# Patient Record
Sex: FEMALE | Race: WHITE | NOT HISPANIC OR LATINO | Employment: FULL TIME | URBAN - METROPOLITAN AREA
[De-identification: names, ages, dates, MRNs, and addresses within clinical notes are randomized per-mention and may not be internally consistent; named-entity substitution may affect disease eponyms.]

---

## 2017-03-13 RX ORDER — FLUTICASONE PROPIONATE 50 MCG
SPRAY, SUSPENSION (ML) NASAL
Qty: 1 BOTTLE | Refills: 1 | Status: SHIPPED | OUTPATIENT
Start: 2017-03-13 | End: 2017-12-27

## 2017-05-23 RX ORDER — LEVOTHYROXINE SODIUM 0.07 MG/1
TABLET ORAL
Qty: 90 TABLET | Refills: 0 | Status: SHIPPED | OUTPATIENT
Start: 2017-05-23 | End: 2017-07-20 | Stop reason: SDUPTHER

## 2017-07-05 ENCOUNTER — APPOINTMENT (OUTPATIENT)
Dept: WOMENS IMAGING | Facility: HOSPITAL | Age: 52
End: 2017-07-05

## 2017-07-05 PROCEDURE — 77067 SCR MAMMO BI INCL CAD: CPT | Performed by: RADIOLOGY

## 2017-07-17 ENCOUNTER — OFFICE VISIT (OUTPATIENT)
Dept: FAMILY MEDICINE CLINIC | Facility: CLINIC | Age: 52
End: 2017-07-17

## 2017-07-17 VITALS
HEIGHT: 62 IN | SYSTOLIC BLOOD PRESSURE: 122 MMHG | DIASTOLIC BLOOD PRESSURE: 78 MMHG | OXYGEN SATURATION: 100 % | WEIGHT: 147 LBS | HEART RATE: 50 BPM | BODY MASS INDEX: 27.05 KG/M2

## 2017-07-17 DIAGNOSIS — Z00.00 HEALTHCARE MAINTENANCE: Primary | ICD-10-CM

## 2017-07-17 DIAGNOSIS — E03.9 ADULT HYPOTHYROIDISM: ICD-10-CM

## 2017-07-17 DIAGNOSIS — E55.9 AVITAMINOSIS D: ICD-10-CM

## 2017-07-17 LAB
25(OH)D3+25(OH)D2 SERPL-MCNC: 38 NG/ML (ref 30–100)
ALBUMIN SERPL-MCNC: 4.6 G/DL (ref 3.5–5.2)
ALBUMIN/GLOB SERPL: 1.8 G/DL
ALP SERPL-CCNC: 65 U/L (ref 39–117)
ALT SERPL-CCNC: 15 U/L (ref 1–33)
AST SERPL-CCNC: 19 U/L (ref 1–32)
BASOPHILS # BLD AUTO: 0.09 10*3/MM3 (ref 0–0.2)
BASOPHILS NFR BLD AUTO: 1.4 % (ref 0–1.5)
BILIRUB SERPL-MCNC: 0.3 MG/DL (ref 0.1–1.2)
BUN SERPL-MCNC: 9 MG/DL (ref 6–20)
BUN/CREAT SERPL: 12.2 (ref 7–25)
CALCIUM SERPL-MCNC: 9.3 MG/DL (ref 8.6–10.5)
CHLORIDE SERPL-SCNC: 100 MMOL/L (ref 98–107)
CHOLEST SERPL-MCNC: 241 MG/DL (ref 0–200)
CO2 SERPL-SCNC: 25 MMOL/L (ref 22–29)
CREAT SERPL-MCNC: 0.74 MG/DL (ref 0.57–1)
EOSINOPHIL # BLD AUTO: 0.13 10*3/MM3 (ref 0–0.7)
EOSINOPHIL NFR BLD AUTO: 2.1 % (ref 0.3–6.2)
ERYTHROCYTE [DISTWIDTH] IN BLOOD BY AUTOMATED COUNT: 13 % (ref 11.7–13)
GLOBULIN SER CALC-MCNC: 2.6 GM/DL
GLUCOSE SERPL-MCNC: 91 MG/DL (ref 65–99)
HCT VFR BLD AUTO: 42.7 % (ref 35.6–45.5)
HDLC SERPL-MCNC: 54 MG/DL (ref 40–60)
HGB BLD-MCNC: 13.7 G/DL (ref 11.9–15.5)
IMM GRANULOCYTES # BLD: 0.02 10*3/MM3 (ref 0–0.03)
IMM GRANULOCYTES NFR BLD: 0.3 % (ref 0–0.5)
LDLC SERPL CALC-MCNC: 163 MG/DL (ref 0–100)
LDLC/HDLC SERPL: 3.02 {RATIO}
LYMPHOCYTES # BLD AUTO: 2.23 10*3/MM3 (ref 0.9–4.8)
LYMPHOCYTES NFR BLD AUTO: 35.3 % (ref 19.6–45.3)
MCH RBC QN AUTO: 30.6 PG (ref 26.9–32)
MCHC RBC AUTO-ENTMCNC: 32.1 G/DL (ref 32.4–36.3)
MCV RBC AUTO: 95.3 FL (ref 80.5–98.2)
MONOCYTES # BLD AUTO: 0.59 10*3/MM3 (ref 0.2–1.2)
MONOCYTES NFR BLD AUTO: 9.4 % (ref 5–12)
NEUTROPHILS # BLD AUTO: 3.25 10*3/MM3 (ref 1.9–8.1)
NEUTROPHILS NFR BLD AUTO: 51.5 % (ref 42.7–76)
PLATELET # BLD AUTO: 231 10*3/MM3 (ref 140–500)
POTASSIUM SERPL-SCNC: 4 MMOL/L (ref 3.5–5.2)
PROT SERPL-MCNC: 7.2 G/DL (ref 6–8.5)
RBC # BLD AUTO: 4.48 10*6/MM3 (ref 3.9–5.2)
SODIUM SERPL-SCNC: 139 MMOL/L (ref 136–145)
TRIGL SERPL-MCNC: 119 MG/DL (ref 0–150)
VLDLC SERPL CALC-MCNC: 23.8 MG/DL (ref 5–40)
WBC # BLD AUTO: 6.31 10*3/MM3 (ref 4.5–10.7)

## 2017-07-17 PROCEDURE — 99396 PREV VISIT EST AGE 40-64: CPT | Performed by: FAMILY MEDICINE

## 2017-07-17 RX ORDER — CLOTRIMAZOLE AND BETAMETHASONE DIPROPIONATE 10; .64 MG/G; MG/G
CREAM TOPICAL 2 TIMES DAILY
COMMUNITY
End: 2017-11-22

## 2017-07-17 RX ORDER — DEXLANSOPRAZOLE 60 MG/1
60 CAPSULE, DELAYED RELEASE ORAL DAILY
COMMUNITY
End: 2017-09-08 | Stop reason: SDUPTHER

## 2017-07-17 NOTE — PROGRESS NOTES
Carolin Zapien is a 52 y.o. female.  Seen 07/17/2017    Assessment/Plan   Problem List Items Addressed This Visit        Digestive    Avitaminosis D    Relevant Orders    Vitamin D 25 Hydroxy       Endocrine    Adult hypothyroidism    Overview     [place] 7/17/2017  Labs are drawn today.             Other Visit Diagnoses     Healthcare maintenance    -  Primary    Relevant Orders    Comprehensive metabolic panel (Completed)    Lipid Panel With LDL/HDL Ratio (Completed)    CBC and Differential (Completed)    Vitamin D 25 Hydroxy             Return in about 1 year (around 7/17/2018) for Annual physical.  There are no Patient Instructions on file for this visit.    Subjective     Chief Complaint   Patient presents with   • Annual Exam     Social History   Substance Use Topics   • Smoking status: Former Smoker   • Smokeless tobacco: Never Used   • Alcohol use Yes      Comment: SOCIAL       History of Present Illness     Annual Exam: Patient presents for annual exam.  findings; last pap: approximate date 7/17 and was normal  Last mammo: approximate date 7/17 and was normal   The patient is not  still having menses.Last menstrual period: > 5 years ago.  History; colonoscopy: Last colonoscopy: colonoscopy 7 years ago without abnormalities.    The patient wears seatbelts: yes.  The patient regularly exercises: yes. She does walking     She does see a dentist regularly.   Her immunization are up-to-date.  She is eating a healthy diet.     The following portions of the patient's history were reviewed and updated as appropriate:PMHroutine: Social history , Past Medical History, Surgical history , Allergies, Current Medications, Active Problem List, Family History and Health Maintenance    Review of Systems   Constitutional: Negative for activity change, appetite change, chills, fatigue, fever and unexpected weight change.   HENT: Positive for sore throat. Negative for congestion, ear pain, hearing loss, nosebleeds and  "rhinorrhea.    Eyes: Positive for discharge (related to tear ducts being clogged as well as allergies) and itching. Negative for pain, redness and visual disturbance.   Respiratory: Negative for cough, shortness of breath and wheezing.    Cardiovascular: Negative for chest pain, palpitations and leg swelling.   Gastrointestinal: Positive for abdominal pain (right sided abdominal pain -- really on the flank. She had a kidney cyst many years ago but this pain is different. Constant, when she wore a belt one time it was severe. moving with twisting makes it worse. Eating doesn't affect it. ). Negative for blood in stool, constipation, diarrhea, nausea and vomiting.   Endocrine: Negative for cold intolerance and heat intolerance.   Genitourinary: Negative for difficulty urinating, dysuria, frequency, hematuria, pelvic pain, urgency and vaginal discharge.   Musculoskeletal: Negative for arthralgias, back pain and joint swelling.        Joint stiffness    Skin: Positive for rash (looks like rash). Negative for wound.   Neurological: Negative for dizziness, weakness, numbness and headaches.   Hematological: Does not bruise/bleed easily.   Psychiatric/Behavioral: Negative for dysphoric mood, sleep disturbance and suicidal ideas. The patient is not nervous/anxious.        Objective   Vitals:    07/17/17 0840   BP: 122/78   Pulse: 50   SpO2: 100%   Weight: 147 lb (66.7 kg)   Height: 61.5\" (156.2 cm)     Body mass index is 27.33 kg/(m^2).  Physical Exam   Constitutional: She is oriented to person, place, and time. She appears well-developed and well-nourished. No distress.   HENT:   Head: Normocephalic and atraumatic.   Right Ear: Tympanic membrane, external ear and ear canal normal.   Left Ear: Tympanic membrane, external ear and ear canal normal.   Mouth/Throat: Oropharynx is clear and moist.   Eyes: Conjunctivae are normal.   Neck: Normal range of motion. Neck supple. No tracheal deviation present. No thyromegaly present. "   Cardiovascular: Normal rate, regular rhythm, normal heart sounds and intact distal pulses.    Pulmonary/Chest: Effort normal and breath sounds normal. No respiratory distress. She has no wheezes. She has no rales.   Abdominal: Soft. Bowel sounds are normal. She exhibits no distension. There is no hepatosplenomegaly. There is no tenderness.   Musculoskeletal: She exhibits no edema or deformity.   Lymphadenopathy:     She has no cervical adenopathy.   Neurological: She is alert and oriented to person, place, and time.   Skin: Skin is warm and dry.   Psychiatric: She has a normal mood and affect. Her behavior is normal. Judgment and thought content normal.   Vitals reviewed.    Reviewed Data:  Labs are drawn today

## 2017-07-18 ENCOUNTER — TELEPHONE (OUTPATIENT)
Dept: FAMILY MEDICINE CLINIC | Facility: CLINIC | Age: 52
End: 2017-07-18

## 2017-07-18 LAB — SPECIMEN STATUS: NORMAL

## 2017-07-19 LAB — TSH SERPL DL<=0.005 MIU/L-ACNC: 2.85 MIU/ML (ref 0.27–4.2)

## 2017-07-20 RX ORDER — LEVOTHYROXINE SODIUM 0.07 MG/1
TABLET ORAL
Qty: 90 TABLET | Refills: 0 | Status: SHIPPED | OUTPATIENT
Start: 2017-07-20 | End: 2017-11-09 | Stop reason: SDUPTHER

## 2017-09-05 ENCOUNTER — TELEPHONE (OUTPATIENT)
Dept: GASTROENTEROLOGY | Facility: CLINIC | Age: 52
End: 2017-09-05

## 2017-09-05 NOTE — TELEPHONE ENCOUNTER
----- Message from Steve Lozoya sent at 9/5/2017 11:33 AM EDT -----  Regarding: PT CALLED  Contact: [phone]  PT STATED HER REFLUX IS FLARING UP AGAIN & IS REQUESTING  A REFILL ?       PHARM#[phone]

## 2017-09-05 NOTE — TELEPHONE ENCOUNTER
Called pt back. Advised that she has not been seen since 6/2013. Advised that in order to fill prescription medication, our doctors need to see pt at least once per year. Advised we can make an appt for her to be seen or if she does not feel like she needs to be seen by a GI specialist, she can see if her PMD can take over filling medication. She verb understanding and states she will see if her PMD will mora med and if not will call back to make appt.

## 2017-09-06 ENCOUNTER — TELEPHONE (OUTPATIENT)
Dept: FAMILY MEDICINE CLINIC | Facility: CLINIC | Age: 52
End: 2017-09-06

## 2017-09-06 NOTE — TELEPHONE ENCOUNTER
----- Message from Cesilia Montague MA sent at 9/6/2017  4:35 PM EDT -----  Pt called and stated that she has had problems with reflux for over 10 years. She would take Dexilant for a couple months then stop. Ok to send in a new Rx?

## 2017-09-08 RX ORDER — DEXLANSOPRAZOLE 60 MG/1
60 CAPSULE, DELAYED RELEASE ORAL DAILY
Qty: 90 CAPSULE | Refills: 0 | Status: SHIPPED | OUTPATIENT
Start: 2017-09-08 | End: 2017-11-22

## 2017-11-09 RX ORDER — LEVOTHYROXINE SODIUM 0.07 MG/1
TABLET ORAL
Qty: 90 TABLET | Refills: 1 | Status: SHIPPED | OUTPATIENT
Start: 2017-11-09 | End: 2017-12-27

## 2017-11-22 ENCOUNTER — OFFICE VISIT (OUTPATIENT)
Dept: GASTROENTEROLOGY | Facility: CLINIC | Age: 52
End: 2017-11-22

## 2017-11-22 VITALS
BODY MASS INDEX: 26.31 KG/M2 | HEIGHT: 62 IN | SYSTOLIC BLOOD PRESSURE: 122 MMHG | DIASTOLIC BLOOD PRESSURE: 78 MMHG | WEIGHT: 143 LBS

## 2017-11-22 DIAGNOSIS — K21.9 GASTROESOPHAGEAL REFLUX DISEASE WITHOUT ESOPHAGITIS: Primary | ICD-10-CM

## 2017-11-22 PROCEDURE — 99204 OFFICE O/P NEW MOD 45 MIN: CPT | Performed by: NURSE PRACTITIONER

## 2017-11-22 RX ORDER — DEXLANSOPRAZOLE 60 MG/1
60 CAPSULE, DELAYED RELEASE ORAL DAILY
Qty: 30 CAPSULE | Refills: 1 | Status: SHIPPED | OUTPATIENT
Start: 2017-11-22 | End: 2017-12-22

## 2017-11-22 NOTE — PROGRESS NOTES
Chief Complaint   Patient presents with   • Heartburn     HPI    52-year-old female patient previously evaluated in our office in November 2012 for GERD follow up, seen previously in 2010 upon referral from her PCP for complaints of GERD.  Patient has long-standing issues related to GERD and epigastric discomfort.  Previous workup has included an EGD in September 2010 with evidence of chronic gastritis with negative pathology.  At the time of her last office visit patient was on lansoprazole 30 mg daily with good control of her symptoms.  Previous medication trials have included Protonix which caused hives and omeprazole which was of no benefit.  She has also been on histamine blockers to include Zantac and Pepcid which were not effective with symptomatic management.  She has been lost to follow-up until today, presenting as a new patient with her last visit being greater than 3 years.    Patient presents today complaining of significant symptoms related to acid reflux.  She reports having been off of all PPI or histamine blockers for a period of 2-3 years until March of this year.  At that time she had symptoms of acid reflux that had recurred and began using an old prescription for Dexilant that she had at home.  Apparently, since her last office visit the lansoprazole had became less effective and she was placed on Dexilant by her PCP.  She states that although the symptoms have improved after restarting the medications in March she is still having episodes that occur daily with hot sour acid refluxing into the back of her throat, waking at night frequently with a cough and symptoms of a chronic sore throat.  She has had associated nausea that is mild but no episodes of vomiting.  She denies any dysphagia, odynophagia and her weight has been stable.  Her father has a hx of Wylie's.    Per hx her last c-scope was in 9/2010 with findings of diverticulosis and IH.  She has no complaints related to lower GI issues,  specifically denies any melena or bright red blood per stool.  There is no family hx of colon cancer.    Past Medical History:   Diagnosis Date   • Atypical chest pain 04/14/2014    RESOLVED; 07/20/2015   • Fatigue    • Kidney stone    • Multiple joint pain 07/18/2013   • Raynaud's disease    • Seborrheic dermatitis    • Thyroid disease      Past Surgical History:   Procedure Laterality Date   • COLPOSCOPY      WITH BX; RESOLVED   • DILATATION AND CURETTAGE     • SEPTOPLASTY, TURBINECTOMY, ENDOSCOPIC MAXILLARY ANTROSTOMY      [facility]   • TONSILLECTOMY     • URETEROSCOPY      with cystoscopy       Current Outpatient Prescriptions   Medication Sig Dispense Refill   • albuterol (PROAIR HFA) 108 (90 BASE) MCG/ACT inhaler Inhale 2 puffs every 4 (four) hours as needed for wheezing. 8 g 5   • Cholecalciferol (VITAMIN D) 2000 UNITS capsule Take by mouth.     • estradiol (VIVELLE-DOT) 0.025 MG/24HR patch      • fexofenadine (ALLEGRA ALLERGY) 180 MG tablet Take by mouth.     • fluticasone (FLONASE) 50 MCG/ACT nasal spray PLACE TWO SPRAYS IN EACH NOSTRIL ONCE DAILY 1 bottle 1   • levothyroxine (SYNTHROID, LEVOTHROID) 75 MCG tablet TAKE ONE TABLET BY MOUTH DAILY 90 tablet 1   • Probiotic Product (PROBIOTIC DAILY PO) Take by mouth.     • progesterone (PROMETRIUM) 100 MG capsule Take by mouth.     • pseudoephedrine (SUDAFED) 120 MG 12 hr tablet Take 120 mg by mouth Every 12 (Twelve) Hours.     • dexlansoprazole (DEXILANT) 60 MG capsule Take 1 capsule by mouth Daily for 30 days. 30 capsule 1     No current facility-administered medications for this visit.        PRN Meds:.    Allergies   Allergen Reactions   • Pantoprazole Hives   • Sulfa Antibiotics    • Terbinafine        Social History     Social History   • Marital status:      Spouse name: N/A   • Number of children: N/A   • Years of education: N/A     Occupational History   • Not on file.     Social History Main Topics   • Smoking status: Former Smoker     Quit date:  "11/22/2005   • Smokeless tobacco: Never Used   • Alcohol use Yes      Comment: SOCIAL   • Drug use: No   • Sexual activity: Defer     Other Topics Concern   • Not on file     Social History Narrative       Family History   Problem Relation Age of Onset   • Hodgkin's lymphoma Mother    • Migraines Mother    • Thyroid disease Mother    • Cancer Mother    • Hypertension Father    • Depression Father    • Atrial fibrillation Father    • Migraines Sister    • Cardiomyopathy Brother      resolved   • Lymphoma Son      Tcell       Review of Systems   Constitutional: Negative for activity change, appetite change and unexpected weight change.   HENT: Negative for trouble swallowing.    Respiratory: Negative for choking, chest tightness and shortness of breath.    Cardiovascular: Negative for chest pain and palpitations.   Gastrointestinal: Negative for abdominal distention, abdominal pain, blood in stool, constipation, diarrhea, nausea and vomiting.   Allergic/Immunologic: Negative for immunocompromised state.       Vitals:    11/22/17 1355   BP: 122/78     /78  Ht 61.5\" (156.2 cm)  Wt 143 lb (64.9 kg)  BMI 26.58 kg/m2     Physical Exam   Constitutional: She is oriented to person, place, and time. She appears well-developed and well-nourished. No distress.   HENT:   Head: Normocephalic and atraumatic.   Mouth/Throat: Oropharynx is clear and moist.   Eyes: No scleral icterus.   Cardiovascular: Normal rate and regular rhythm.    Abdominal: Soft. Bowel sounds are normal. She exhibits no distension. There is no tenderness.   Neurological: She is alert and oriented to person, place, and time.   Skin: Skin is warm and dry.   Psychiatric: She has a normal mood and affect.   Vitals reviewed.      ASSESSMENT AND PLAN    Carolin was seen today for heartburn.    Diagnoses and all orders for this visit:    Gastroesophageal reflux disease without esophagitis  Comments:  Continue Dexilant, plan for EGD  Orders:  -     Case Request; " Standing  -     Case Request    Other orders  -     dexlansoprazole (DEXILANT) 60 MG capsule; Take 1 capsule by mouth Daily for 30 days.  -     Obtain informed consent; Standing    Patient will continue her current PPI.  She can use over-the-counter Zantac, Pepcid or Tums as needed.  She will be scheduled for an EGD for further evaluation based upon complaints of recurrent bile acid reflux, onset of nightly cough and chronic sore throat.  Further treatment plan pending outcome of endoscopy and pathology findings         MADELYN Swartz   [facility]  [address] [place] [address]  Office: [phone]

## 2017-12-08 ENCOUNTER — OFFICE VISIT (OUTPATIENT)
Dept: FAMILY MEDICINE CLINIC | Facility: CLINIC | Age: 52
End: 2017-12-08

## 2017-12-08 VITALS
HEART RATE: 61 BPM | WEIGHT: 142 LBS | BODY MASS INDEX: 26.81 KG/M2 | DIASTOLIC BLOOD PRESSURE: 84 MMHG | SYSTOLIC BLOOD PRESSURE: 122 MMHG | OXYGEN SATURATION: 99 % | HEIGHT: 61 IN

## 2017-12-08 DIAGNOSIS — R10.11 RUQ PAIN: Primary | ICD-10-CM

## 2017-12-08 PROBLEM — I73.00 RAYNAUD DISEASE: Status: ACTIVE | Noted: 2017-12-08

## 2017-12-08 PROCEDURE — 99213 OFFICE O/P EST LOW 20 MIN: CPT | Performed by: FAMILY MEDICINE

## 2017-12-08 RX ORDER — LEVOCETIRIZINE DIHYDROCHLORIDE 5 MG/1
5 TABLET, FILM COATED ORAL EVERY EVENING
Status: ON HOLD | COMMUNITY
Start: 2017-12-05 | End: 2017-12-14

## 2017-12-08 NOTE — PROGRESS NOTES
Carolin Zapien is a 52 y.o. female.      Assessment/Plan   Problem List Items Addressed This Visit     None      Visit Diagnoses     RUQ pain    -  Primary    Relevant Orders    US Abdomen Complete             Return Dependent on test results.      Chief Complaint   Patient presents with   • Pain     R side      Social History   Substance Use Topics   • Smoking status: Former Smoker     Quit date: 11/22/2005   • Smokeless tobacco: Never Used   • Alcohol use Yes      Comment: SOCIAL       Abdominal Pain   This is a chronic problem. The current episode started more than 1 month ago. The onset quality is gradual. The problem occurs constantly. The most recent episode lasted 12 months. The problem has been rapidly worsening. The pain is located in the RUQ. The pain is at a severity of 7/10. The quality of the pain is sharp. The abdominal pain radiates to the RLQ, RUQ and right flank. Associated symptoms include headaches and weight loss. Pertinent negatives include no anorexia, arthralgias, belching, constipation, diarrhea, dysuria, fever, flatus, frequency, hematochezia, hematuria, melena, myalgias, nausea or vomiting. Nothing aggravates the pain. The pain is relieved by nothing. Prior diagnostic workup includes GI consult.      Has had right sided pain on the upper area. She recently moved a cousin recently but did not life a lot of heavy lifting. She was putting stuff in drawers. No nausea. No association with meals. If things are too tight it hurts, like panty hose, but not consistently. That occurred earlier in the year and got better but now it is worse again. It got bad enough she thought about going to the ER two days ago. Took some tylenol. Helped a little. Doesn't radiate. No injury. Has an endoscopy next week. Did not talk to her GI doctor about the right sided pain. Does have a hx of some sort of abnormal kidney cyst on the right two years ago. She saw a specialist who was not concerned about it and has  "had no further f/u.     The following portions of the patient's history were reviewed and updated as appropriate:PMHroutine: Social history , Allergies, Current Medications, Active Problem List and Health Maintenance    Review of Systems   Constitutional: Positive for weight loss. Negative for activity change, appetite change, chills, fatigue, fever and unexpected weight change.   HENT: Negative for congestion, ear pain, hearing loss, nosebleeds, rhinorrhea and sore throat.    Eyes: Negative for pain, redness and visual disturbance.   Respiratory: Negative for cough, shortness of breath and wheezing.    Cardiovascular: Negative for chest pain, palpitations and leg swelling.   Gastrointestinal: Positive for abdominal pain. Negative for anorexia, blood in stool, constipation, diarrhea, flatus, hematochezia, melena, nausea and vomiting.   Endocrine: Negative for cold intolerance and heat intolerance.   Genitourinary: Negative for difficulty urinating, dysuria, frequency, hematuria, pelvic pain, urgency and vaginal discharge.   Musculoskeletal: Positive for back pain. Negative for arthralgias, joint swelling and myalgias.   Skin: Negative for rash and wound.   Neurological: Positive for headaches. Negative for dizziness, weakness and numbness.   Hematological: Does not bruise/bleed easily.   Psychiatric/Behavioral: Negative for dysphoric mood, sleep disturbance and suicidal ideas. The patient is not nervous/anxious.        Objective   Vitals:    12/08/17 1133   BP: 122/84   Pulse: 61   SpO2: 99%   Weight: 64.4 kg (142 lb)   Height: 156.2 cm (61.5\")     Body mass index is 26.4 kg/(m^2).  Physical Exam   Constitutional: She appears well-developed and well-nourished.   Abdominal: Soft. Bowel sounds are normal. She exhibits no distension and no mass. There is no tenderness. No hernia.   Negative Anders's   Psychiatric: She has a normal mood and affect. Her behavior is normal. Judgment and thought content normal.   Vitals " reviewed.    Reviewed Data:  No visits with results within 1 Month(s) from this visit.  Latest known visit with results is:    Office Visit on 07/17/2017   Component Date Value Ref Range Status   • Glucose 07/17/2017 91  65 - 99 mg/dL Final   • BUN 07/17/2017 9  6 - 20 mg/dL Final   • Creatinine 07/17/2017 0.74  0.57 - 1.00 mg/dL Final   • eGFR Non  Am 07/17/2017 83  >60 mL/min/1.73 Final   • eGFR African Am 07/17/2017 100  >60 mL/min/1.73 Final   • BUN/Creatinine Ratio 07/17/2017 12.2  7.0 - 25.0 Final   • Sodium 07/17/2017 139  136 - 145 mmol/L Final   • Potassium 07/17/2017 4.0  3.5 - 5.2 mmol/L Final   • Chloride 07/17/2017 100  98 - 107 mmol/L Final   • Total CO2 07/17/2017 25.0  22.0 - 29.0 mmol/L Final   • Calcium 07/17/2017 9.3  8.6 - 10.5 mg/dL Final   • Total Protein 07/17/2017 7.2  6.0 - 8.5 g/dL Final   • Albumin 07/17/2017 4.60  3.50 - 5.20 g/dL Final   • Globulin 07/17/2017 2.6  gm/dL Final   • A/G Ratio 07/17/2017 1.8  g/dL Final   • Total Bilirubin 07/17/2017 0.3  0.1 - 1.2 mg/dL Final   • Alkaline Phosphatase 07/17/2017 65  39 - 117 U/L Final   • AST (SGOT) 07/17/2017 19  1 - 32 U/L Final   • ALT (SGPT) 07/17/2017 15  1 - 33 U/L Final   • Total Cholesterol 07/17/2017 241* 0 - 200 mg/dL Final   • Triglycerides 07/17/2017 119  0 - 150 mg/dL Final   • HDL Cholesterol 07/17/2017 54  40 - 60 mg/dL Final   • VLDL Cholesterol 07/17/2017 23.8  5 - 40 mg/dL Final   • LDL Cholesterol  07/17/2017 163* 0 - 100 mg/dL Final   • LDL/HDL Ratio 07/17/2017 3.02   Final   • WBC 07/17/2017 6.31  4.50 - 10.70 10*3/mm3 Final   • RBC 07/17/2017 4.48  3.90 - 5.20 10*6/mm3 Final   • Hemoglobin 07/17/2017 13.7  11.9 - 15.5 g/dL Final   • Hematocrit 07/17/2017 42.7  35.6 - 45.5 % Final   • MCV 07/17/2017 95.3  80.5 - 98.2 fL Final   • MCH 07/17/2017 30.6  26.9 - 32.0 pg Final   • MCHC 07/17/2017 32.1* 32.4 - 36.3 g/dL Final   • RDW 07/17/2017 13.0  11.7 - 13.0 % Final   • Platelets 07/17/2017 231  140 - 500 10*3/mm3  Final   • Neutrophil Rel % 07/17/2017 51.5  42.7 - 76.0 % Final   • Lymphocyte Rel % 07/17/2017 35.3  19.6 - 45.3 % Final   • Monocyte Rel % 07/17/2017 9.4  5.0 - 12.0 % Final   • Eosinophil Rel % 07/17/2017 2.1  0.3 - 6.2 % Final   • Basophil Rel % 07/17/2017 1.4  0.0 - 1.5 % Final   • Neutrophils Absolute 07/17/2017 3.25  1.90 - 8.10 10*3/mm3 Final   • Lymphocytes Absolute 07/17/2017 2.23  0.90 - 4.80 10*3/mm3 Final   • Monocytes Absolute 07/17/2017 0.59  0.20 - 1.20 10*3/mm3 Final   • Eosinophils Absolute 07/17/2017 0.13  0.00 - 0.70 10*3/mm3 Final   • Basophils Absolute 07/17/2017 0.09  0.00 - 0.20 10*3/mm3 Final   • Immature Granulocyte Rel % 07/17/2017 0.3  0.0 - 0.5 % Final   • Immature Grans Absolute 07/17/2017 0.02  0.00 - 0.03 10*3/mm3 Final   • 25 Hydroxy, Vitamin D 07/17/2017 38.0  30.0 - 100.0 ng/mL Final   • Specimen Status 07/17/2017 Comment   Final   • TSH 07/17/2017 2.850  0.270 - 4.200 mIU/mL Final

## 2017-12-09 ENCOUNTER — HOSPITAL ENCOUNTER (OUTPATIENT)
Dept: ULTRASOUND IMAGING | Facility: HOSPITAL | Age: 52
Discharge: HOME | End: 2017-12-09
Admitting: FAMILY MEDICINE

## 2017-12-09 DIAGNOSIS — R10.11 RUQ PAIN: ICD-10-CM

## 2017-12-09 PROCEDURE — 76700 US EXAM ABDOM COMPLETE: CPT

## 2017-12-12 ENCOUNTER — DOCUMENTATION (OUTPATIENT)
Dept: GASTROENTEROLOGY | Facility: CLINIC | Age: 52
End: 2017-12-12

## 2017-12-12 NOTE — PROGRESS NOTES
I have reviewed all lab results which are normal or stable.  Patient  Has viewed her results on [facility].

## 2017-12-14 ENCOUNTER — ANESTHESIA (OUTPATIENT)
Dept: GASTROENTEROLOGY | Facility: HOSPITAL | Age: 52
End: 2017-12-14

## 2017-12-14 ENCOUNTER — ANESTHESIA EVENT (OUTPATIENT)
Dept: GASTROENTEROLOGY | Facility: HOSPITAL | Age: 52
End: 2017-12-14

## 2017-12-14 ENCOUNTER — HOSPITAL ENCOUNTER (OUTPATIENT)
Facility: HOSPITAL | Age: 52
Setting detail: HOSPITAL OUTPATIENT SURGERY
Discharge: HOME | End: 2017-12-14
Attending: INTERNAL MEDICINE | Admitting: INTERNAL MEDICINE

## 2017-12-14 VITALS
WEIGHT: 140.6 LBS | RESPIRATION RATE: 16 BRPM | OXYGEN SATURATION: 100 % | DIASTOLIC BLOOD PRESSURE: 80 MMHG | HEIGHT: 62 IN | BODY MASS INDEX: 25.88 KG/M2 | SYSTOLIC BLOOD PRESSURE: 131 MMHG | TEMPERATURE: 98.1 F | HEART RATE: 58 BPM

## 2017-12-14 DIAGNOSIS — K21.9 GASTROESOPHAGEAL REFLUX DISEASE WITHOUT ESOPHAGITIS: ICD-10-CM

## 2017-12-14 PROCEDURE — 25010000002 PROPOFOL 1000 MG/ML EMULSION: Performed by: ANESTHESIOLOGY

## 2017-12-14 PROCEDURE — 43239 EGD BIOPSY SINGLE/MULTIPLE: CPT | Performed by: INTERNAL MEDICINE

## 2017-12-14 PROCEDURE — 88312 SPECIAL STAINS GROUP 1: CPT | Performed by: INTERNAL MEDICINE

## 2017-12-14 PROCEDURE — 88305 TISSUE EXAM BY PATHOLOGIST: CPT | Performed by: INTERNAL MEDICINE

## 2017-12-14 PROCEDURE — 25010000002 PROPOFOL 10 MG/ML EMULSION: Performed by: ANESTHESIOLOGY

## 2017-12-14 RX ORDER — PROMETHAZINE HYDROCHLORIDE 25 MG/1
25 TABLET ORAL ONCE AS NEEDED
Status: DISCONTINUED | OUTPATIENT
Start: 2017-12-14 | End: 2017-12-14 | Stop reason: HOSPADM

## 2017-12-14 RX ORDER — GLYCOPYRROLATE 0.2 MG/ML
INJECTION INTRAMUSCULAR; INTRAVENOUS AS NEEDED
Status: DISCONTINUED | OUTPATIENT
Start: 2017-12-14 | End: 2017-12-14 | Stop reason: SURG

## 2017-12-14 RX ORDER — SUCRALFATE 1 G/1
1 TABLET ORAL
Qty: 60 TABLET | Refills: 1 | Status: SHIPPED | OUTPATIENT
Start: 2017-12-14 | End: 2019-02-13

## 2017-12-14 RX ORDER — PROMETHAZINE HYDROCHLORIDE 25 MG/ML
12.5 INJECTION, SOLUTION INTRAMUSCULAR; INTRAVENOUS ONCE AS NEEDED
Status: DISCONTINUED | OUTPATIENT
Start: 2017-12-14 | End: 2017-12-14 | Stop reason: HOSPADM

## 2017-12-14 RX ORDER — SODIUM CHLORIDE, SODIUM LACTATE, POTASSIUM CHLORIDE, CALCIUM CHLORIDE 600; 310; 30; 20 MG/100ML; MG/100ML; MG/100ML; MG/100ML
1000 INJECTION, SOLUTION INTRAVENOUS CONTINUOUS PRN
Status: DISCONTINUED | OUTPATIENT
Start: 2017-12-14 | End: 2017-12-14 | Stop reason: HOSPADM

## 2017-12-14 RX ORDER — PROPOFOL 10 MG/ML
VIAL (ML) INTRAVENOUS AS NEEDED
Status: DISCONTINUED | OUTPATIENT
Start: 2017-12-14 | End: 2017-12-14 | Stop reason: SURG

## 2017-12-14 RX ORDER — LIDOCAINE HYDROCHLORIDE 20 MG/ML
INJECTION, SOLUTION INFILTRATION; PERINEURAL AS NEEDED
Status: DISCONTINUED | OUTPATIENT
Start: 2017-12-14 | End: 2017-12-14 | Stop reason: SURG

## 2017-12-14 RX ORDER — PROMETHAZINE HYDROCHLORIDE 25 MG/1
25 SUPPOSITORY RECTAL ONCE AS NEEDED
Status: DISCONTINUED | OUTPATIENT
Start: 2017-12-14 | End: 2017-12-14 | Stop reason: HOSPADM

## 2017-12-14 RX ADMIN — SODIUM CHLORIDE, POTASSIUM CHLORIDE, SODIUM LACTATE AND CALCIUM CHLORIDE 1000 ML: 600; 310; 30; 20 INJECTION, SOLUTION INTRAVENOUS at 15:10

## 2017-12-14 RX ADMIN — PROPOFOL 200 MCG/KG/MIN: 10 INJECTION, EMULSION INTRAVENOUS at 15:53

## 2017-12-14 RX ADMIN — PROPOFOL 150 MG: 10 INJECTION, EMULSION INTRAVENOUS at 15:51

## 2017-12-14 RX ADMIN — GLYCOPYRROLATE 0.1 MG: 0.2 INJECTION INTRAMUSCULAR; INTRAVENOUS at 15:51

## 2017-12-14 RX ADMIN — LIDOCAINE HYDROCHLORIDE 60 MG: 20 INJECTION, SOLUTION INFILTRATION; PERINEURAL at 15:51

## 2017-12-14 NOTE — ANESTHESIA POSTPROCEDURE EVALUATION
"Patient: Carolin Zapien    Procedure Summary     Date Anesthesia Start Anesthesia Stop Room / Location    12/14/17 [id number]  [facility]       Procedure Diagnosis Surgeon Provider    ESOPHAGOGASTRODUODENOSCOPY with biopsies (N/A Esophagus) Gastroesophageal reflux disease without esophagitis  (Gastroesophageal reflux disease without esophagitis [K21.9]) MD Jordon Rose MD          Anesthesia Type: MAC  Last vitals  BP   142/89 (12/14/17 1505)   Temp   36.6 °C (97.9 °F) (12/14/17 1505)   Pulse   62 (12/14/17 1505)   Resp   14 (12/14/17 1505)     SpO2   100 % (12/14/17 1505)     Post Anesthesia Care and Evaluation    Patient location during evaluation: bedside  Patient participation: complete - patient participated  Level of consciousness: awake  Pain management: adequate  Airway patency: patent  Anesthetic complications: No anesthetic complications    Cardiovascular status: acceptable  Respiratory status: acceptable  Hydration status: acceptable    Comments: /89 (BP Location: Left arm, Patient Position: Sitting)  Pulse 62  Temp 36.6 °C (97.9 °F) (Oral)   Resp 14  Ht 156.2 cm (61.5\")  Wt 63.8 kg (140 lb 9.6 oz)  SpO2 100%  BMI 26.14 kg/m2        "

## 2017-12-14 NOTE — PLAN OF CARE
Problem: Patient Care Overview (Adult)  Goal: Plan of Care Review  Outcome: Ongoing (interventions implemented as appropriate)    12/14/17 1455   Coping/Psychosocial Response Interventions   Plan Of Care Reviewed With patient   Patient Care Overview   Progress no change       Goal: Adult Individualization and Mutuality  Outcome: Ongoing (interventions implemented as appropriate)    12/14/17 1455   Individualization   Patient Specific Preferences na       Goal: Discharge Needs Assessment  Outcome: Ongoing (interventions implemented as appropriate)    12/14/17 1455   Discharge Needs Assessment   Concerns To Be Addressed no discharge needs identified   Discharge Disposition home or self-care   Living Environment   Transportation Available car         Problem: GI Endoscopy (Adult)  Goal: Signs and Symptoms of Listed Potential Problems Will be Absent or Manageable (GI Endoscopy)  Outcome: Ongoing (interventions implemented as appropriate)    12/14/17 1455   GI Endoscopy   Problems Present (GI Endoscopy) none

## 2017-12-14 NOTE — H&P (VIEW-ONLY)
Chief Complaint   Patient presents with   • Heartburn     HPI    52-year-old female patient previously evaluated in our office in November 2012 for GERD follow up, seen previously in 2010 upon referral from her PCP for complaints of GERD.  Patient has long-standing issues related to GERD and epigastric discomfort.  Previous workup has included an EGD in September 2010 with evidence of chronic gastritis with negative pathology.  At the time of her last office visit patient was on lansoprazole 30 mg daily with good control of her symptoms.  Previous medication trials have included Protonix which caused hives and omeprazole which was of no benefit.  She has also been on histamine blockers to include Zantac and Pepcid which were not effective with symptomatic management.  She has been lost to follow-up until today, presenting as a new patient with her last visit being greater than 3 years.    Patient presents today complaining of significant symptoms related to acid reflux.  She reports having been off of all PPI or histamine blockers for a period of 2-3 years until March of this year.  At that time she had symptoms of acid reflux that had recurred and began using an old prescription for Dexilant that she had at home.  Apparently, since her last office visit the lansoprazole had became less effective and she was placed on Dexilant by her PCP.  She states that although the symptoms have improved after restarting the medications in March she is still having episodes that occur daily with hot sour acid refluxing into the back of her throat, waking at night frequently with a cough and symptoms of a chronic sore throat.  She has had associated nausea that is mild but no episodes of vomiting.  She denies any dysphagia, odynophagia and her weight has been stable.  Her father has a hx of Wylie's.    Per hx her last c-scope was in 9/2010 with findings of diverticulosis and IH.  She has no complaints related to lower GI issues,  specifically denies any melena or bright red blood per stool.  There is no family hx of colon cancer.    Past Medical History:   Diagnosis Date   • Atypical chest pain 04/14/2014    RESOLVED; 07/20/2015   • Fatigue    • Kidney stone    • Multiple joint pain 07/18/2013   • Raynaud's disease    • Seborrheic dermatitis    • Thyroid disease      Past Surgical History:   Procedure Laterality Date   • COLPOSCOPY      WITH BX; RESOLVED   • DILATATION AND CURETTAGE     • SEPTOPLASTY, TURBINECTOMY, ENDOSCOPIC MAXILLARY ANTROSTOMY      [facility]   • TONSILLECTOMY     • URETEROSCOPY      with cystoscopy       Current Outpatient Prescriptions   Medication Sig Dispense Refill   • albuterol (PROAIR HFA) 108 (90 BASE) MCG/ACT inhaler Inhale 2 puffs every 4 (four) hours as needed for wheezing. 8 g 5   • Cholecalciferol (VITAMIN D) 2000 UNITS capsule Take by mouth.     • estradiol (VIVELLE-DOT) 0.025 MG/24HR patch      • fexofenadine (ALLEGRA ALLERGY) 180 MG tablet Take by mouth.     • fluticasone (FLONASE) 50 MCG/ACT nasal spray PLACE TWO SPRAYS IN EACH NOSTRIL ONCE DAILY 1 bottle 1   • levothyroxine (SYNTHROID, LEVOTHROID) 75 MCG tablet TAKE ONE TABLET BY MOUTH DAILY 90 tablet 1   • Probiotic Product (PROBIOTIC DAILY PO) Take by mouth.     • progesterone (PROMETRIUM) 100 MG capsule Take by mouth.     • pseudoephedrine (SUDAFED) 120 MG 12 hr tablet Take 120 mg by mouth Every 12 (Twelve) Hours.     • dexlansoprazole (DEXILANT) 60 MG capsule Take 1 capsule by mouth Daily for 30 days. 30 capsule 1     No current facility-administered medications for this visit.        PRN Meds:.    Allergies   Allergen Reactions   • Pantoprazole Hives   • Sulfa Antibiotics    • Terbinafine        Social History     Social History   • Marital status:      Spouse name: N/A   • Number of children: N/A   • Years of education: N/A     Occupational History   • Not on file.     Social History Main Topics   • Smoking status: Former Smoker     Quit date:  "11/22/2005   • Smokeless tobacco: Never Used   • Alcohol use Yes      Comment: SOCIAL   • Drug use: No   • Sexual activity: Defer     Other Topics Concern   • Not on file     Social History Narrative       Family History   Problem Relation Age of Onset   • Hodgkin's lymphoma Mother    • Migraines Mother    • Thyroid disease Mother    • Cancer Mother    • Hypertension Father    • Depression Father    • Atrial fibrillation Father    • Migraines Sister    • Cardiomyopathy Brother      resolved   • Lymphoma Son      Tcell       Review of Systems   Constitutional: Negative for activity change, appetite change and unexpected weight change.   HENT: Negative for trouble swallowing.    Respiratory: Negative for choking, chest tightness and shortness of breath.    Cardiovascular: Negative for chest pain and palpitations.   Gastrointestinal: Negative for abdominal distention, abdominal pain, blood in stool, constipation, diarrhea, nausea and vomiting.   Allergic/Immunologic: Negative for immunocompromised state.       Vitals:    11/22/17 1355   BP: 122/78     /78  Ht 61.5\" (156.2 cm)  Wt 143 lb (64.9 kg)  BMI 26.58 kg/m2     Physical Exam   Constitutional: She is oriented to person, place, and time. She appears well-developed and well-nourished. No distress.   HENT:   Head: Normocephalic and atraumatic.   Mouth/Throat: Oropharynx is clear and moist.   Eyes: No scleral icterus.   Cardiovascular: Normal rate and regular rhythm.    Abdominal: Soft. Bowel sounds are normal. She exhibits no distension. There is no tenderness.   Neurological: She is alert and oriented to person, place, and time.   Skin: Skin is warm and dry.   Psychiatric: She has a normal mood and affect.   Vitals reviewed.      ASSESSMENT AND PLAN    Carolin was seen today for heartburn.    Diagnoses and all orders for this visit:    Gastroesophageal reflux disease without esophagitis  Comments:  Continue Dexilant, plan for EGD  Orders:  -     Case Request; " Standing  -     Case Request    Other orders  -     dexlansoprazole (DEXILANT) 60 MG capsule; Take 1 capsule by mouth Daily for 30 days.  -     Obtain informed consent; Standing    Patient will continue her current PPI.  She can use over-the-counter Zantac, Pepcid or Tums as needed.  She will be scheduled for an EGD for further evaluation based upon complaints of recurrent bile acid reflux, onset of nightly cough and chronic sore throat.  Further treatment plan pending outcome of endoscopy and pathology findings         MADELYN Swartz   [facility]  [address] [place] [address]  Office: [phone]

## 2017-12-14 NOTE — ANESTHESIA PREPROCEDURE EVALUATION
Anesthesia Evaluation     no history of anesthetic complications:         Airway   Mallampati: II  no difficulty expected  Dental      Pulmonary - normal exam   (+) a smoker Former, asthma,   Cardiovascular - normal exam        Neuro/Psych  GI/Hepatic/Renal/Endo    (+)  GERD, hypothyroidism,     Musculoskeletal     Abdominal    Substance History      OB/GYN          Other                                        Anesthesia Plan    ASA 2     MAC     Anesthetic plan and risks discussed with patient.

## 2017-12-14 NOTE — ADDENDUM NOTE
Addendum  created 12/14/17 1736 by Raffi Crouch MD    Anesthesia Review and Sign - Ready for Procedure, Anesthesia Review and Sign - Undo

## 2017-12-15 LAB
LAB AP CASE REPORT: NORMAL
Lab: NORMAL
PATH REPORT.FINAL DX SPEC: NORMAL
PATH REPORT.GROSS SPEC: NORMAL

## 2017-12-18 ENCOUNTER — TELEPHONE (OUTPATIENT)
Dept: GASTROENTEROLOGY | Facility: CLINIC | Age: 52
End: 2017-12-18

## 2017-12-18 DIAGNOSIS — R10.13 DYSPEPSIA: ICD-10-CM

## 2017-12-18 DIAGNOSIS — R10.11 RUQ PAIN: Primary | ICD-10-CM

## 2017-12-18 NOTE — TELEPHONE ENCOUNTER
----- Message from Ganesh Davison MD sent at 12/18/2017 12:29 PM EST -----  Pathology is negative, please schedule her a HIDA scan as soon as possible, as if she needs her gallbladder out she wants it done before the end of the year

## 2017-12-18 NOTE — PROGRESS NOTES
Pathology is negative, please schedule her a HIDA scan as soon as possible, as if she needs her gallbladder out she wants it done before the end of the year

## 2017-12-18 NOTE — TELEPHONE ENCOUNTER
Called pt; left a message that per Dr Davison: the pathology from her EGD was negative and Dr Davison recommends to proceed with the HIDA scan to evaluate her gallbladder. Advised that MD had recommended to have it done ASAP as she prefers to have everything done before the end of the year. Advised that I will place the order for the HIDA scan and she can call the scheduling department at [phone] to schedule. Pt to call back with any questions.   Order for HIDA scan placed.

## 2017-12-19 ENCOUNTER — TELEPHONE (OUTPATIENT)
Dept: GASTROENTEROLOGY | Facility: CLINIC | Age: 52
End: 2017-12-19

## 2017-12-19 NOTE — TELEPHONE ENCOUNTER
----- Message from Alexandre Hanson sent at 12/19/2017  3:06 PM EST -----  Regarding: MED QUESTIONS   Contact: [phone]  PT CALLED FOR MEDICATION QUESTIONS

## 2017-12-20 NOTE — TELEPHONE ENCOUNTER
Pt returned call per a staff message.     Called pt back. Pt states she had an endoscopy and the test results came back normal with mild gastritis. She states she has her HIDA scan scheduled for tomorrow and needs to know if she should continue the Dexilant or not since the path results were normal.     Notes Recorded by Ganesh Davison MD on 12/18/2017 at 12:29 PM  Pathology is negative, please schedule her a HIDA scan as soon as possible, as if she needs her gallbladder out she wants it done before the end of the year    Advised that per Dr Davison, the path results were overall negative and MD recommends to proceed with the HIDA scan to assess her gallbladder function. Pt states she knows that she wants to know if she should continue the Dexilant. Advised that yes,m she should continue the Dexilant for now. Pt states she does not want to continue a medication if she does not need it and since the path results were negative she is not sure she needs it. Asked if she was having reflux before starting the medication. Pt states yes. Advised that she should keep taking the medication then. Again, pt stated she does not want to take  a medication if she does not need it and her path results were normal. Advised that she can try stopping the medication and see how she does without it. Pt states she would rather this recommendation come from the Dr. Advised will send a message to Dr Davison and call her back. Pt verb understanding.

## 2017-12-20 NOTE — TELEPHONE ENCOUNTER
Per Dr. Davison: Continue it for now while we are working up her symptoms. hida is tomorrow. (Routing comment).  Patient called, advised as per Dr. Davison's note. She verb understanding and is agreeable to continue Dexilant.

## 2017-12-21 ENCOUNTER — HOSPITAL ENCOUNTER (OUTPATIENT)
Dept: NUCLEAR MEDICINE | Facility: HOSPITAL | Age: 52
Discharge: HOME | End: 2017-12-21
Attending: INTERNAL MEDICINE

## 2017-12-21 DIAGNOSIS — R10.13 DYSPEPSIA: ICD-10-CM

## 2017-12-21 DIAGNOSIS — R10.11 RUQ PAIN: ICD-10-CM

## 2017-12-21 PROCEDURE — A9537 TC99M MEBROFENIN: HCPCS | Performed by: INTERNAL MEDICINE

## 2017-12-21 PROCEDURE — 0 TECHNETIUM TC 99M MEBROFENIN KIT: Performed by: INTERNAL MEDICINE

## 2017-12-21 PROCEDURE — 78227 HEPATOBIL SYST IMAGE W/DRUG: CPT

## 2017-12-21 RX ORDER — KIT FOR THE PREPARATION OF TECHNETIUM TC 99M MEBROFENIN 45 MG/10ML
1 INJECTION, POWDER, LYOPHILIZED, FOR SOLUTION INTRAVENOUS
Status: COMPLETED | OUTPATIENT
Start: 2017-12-21 | End: 2017-12-21

## 2017-12-21 RX ADMIN — MEBROFENIN 1 DOSE: 45 INJECTION, POWDER, LYOPHILIZED, FOR SOLUTION INTRAVENOUS at 07:37

## 2017-12-21 NOTE — TELEPHONE ENCOUNTER
Per Dr. Davison: Please call her immediately today and get her into [facility] in the next 7 days to get her gallbladder removed by December 31.  Patient called, advised of Dr. Davison's note. Advised an office visit has been made for 12/22/17@1030.  Advised to stay on her Dexilant and Carafate until she has seen Dr. Gonzalez and has discussed options with him. She verb understanding.

## 2017-12-21 NOTE — PROGRESS NOTES
RHONDA mai    Ladies:    Please call her immediately today and get her into [facility] in the next 7 days to get her gallbladder removed by December 31 thx  bd

## 2017-12-22 ENCOUNTER — PREP FOR SURGERY (OUTPATIENT)
Dept: OTHER | Facility: HOSPITAL | Age: 52
End: 2017-12-22

## 2017-12-22 DIAGNOSIS — K81.9 CHOLECYSTITIS: Primary | ICD-10-CM

## 2017-12-27 ENCOUNTER — APPOINTMENT (OUTPATIENT)
Dept: PREADMISSION TESTING | Facility: HOSPITAL | Age: 52
End: 2017-12-27

## 2017-12-27 VITALS
RESPIRATION RATE: 20 BRPM | OXYGEN SATURATION: 100 % | SYSTOLIC BLOOD PRESSURE: 126 MMHG | HEART RATE: 59 BPM | WEIGHT: 141 LBS | BODY MASS INDEX: 25.95 KG/M2 | TEMPERATURE: 97.7 F | HEIGHT: 62 IN | DIASTOLIC BLOOD PRESSURE: 82 MMHG

## 2017-12-27 LAB
ANION GAP SERPL CALCULATED.3IONS-SCNC: 11 MMOL/L
BUN BLD-MCNC: 10 MG/DL (ref 6–20)
BUN/CREAT SERPL: 14.3 (ref 7–25)
CALCIUM SPEC-SCNC: 8.6 MG/DL (ref 8.6–10.5)
CHLORIDE SERPL-SCNC: 106 MMOL/L (ref 98–107)
CO2 SERPL-SCNC: 26 MMOL/L (ref 22–29)
CREAT BLD-MCNC: 0.7 MG/DL (ref 0.57–1)
DEPRECATED RDW RBC AUTO: 43 FL (ref 37–54)
ERYTHROCYTE [DISTWIDTH] IN BLOOD BY AUTOMATED COUNT: 12.6 % (ref 11.7–13)
GFR SERPL CREATININE-BSD FRML MDRD: 88 ML/MIN/1.73
GLUCOSE BLD-MCNC: 70 MG/DL (ref 65–99)
HCT VFR BLD AUTO: 40.4 % (ref 35.6–45.5)
HGB BLD-MCNC: 13.2 G/DL (ref 11.9–15.5)
MCH RBC QN AUTO: 30.7 PG (ref 26.9–32)
MCHC RBC AUTO-ENTMCNC: 32.7 G/DL (ref 32.4–36.3)
MCV RBC AUTO: 94 FL (ref 80.5–98.2)
PLATELET # BLD AUTO: 230 10*3/MM3 (ref 140–500)
PMV BLD AUTO: 12.1 FL (ref 6–12)
POTASSIUM BLD-SCNC: 3.4 MMOL/L (ref 3.5–5.2)
RBC # BLD AUTO: 4.3 10*6/MM3 (ref 3.9–5.2)
SODIUM BLD-SCNC: 143 MMOL/L (ref 136–145)
WBC NRBC COR # BLD: 5.72 10*3/MM3 (ref 4.5–10.7)

## 2017-12-27 PROCEDURE — 36415 COLL VENOUS BLD VENIPUNCTURE: CPT

## 2017-12-27 PROCEDURE — 80048 BASIC METABOLIC PNL TOTAL CA: CPT | Performed by: SURGERY

## 2017-12-27 PROCEDURE — 85027 COMPLETE CBC AUTOMATED: CPT | Performed by: SURGERY

## 2017-12-27 RX ORDER — FLUTICASONE PROPIONATE 50 MCG
2 SPRAY, SUSPENSION (ML) NASAL EVERY MORNING
COMMUNITY

## 2017-12-27 RX ORDER — DEXLANSOPRAZOLE 60 MG/1
60 CAPSULE, DELAYED RELEASE ORAL DAILY PRN
COMMUNITY
End: 2019-02-13

## 2017-12-27 RX ORDER — DICYCLOMINE HYDROCHLORIDE 10 MG/1
10 CAPSULE ORAL
COMMUNITY
End: 2019-02-13

## 2017-12-27 RX ORDER — LEVOTHYROXINE SODIUM 0.07 MG/1
75 TABLET ORAL EVERY MORNING
COMMUNITY
End: 2018-05-23 | Stop reason: SDUPTHER

## 2017-12-29 ENCOUNTER — ANESTHESIA EVENT (OUTPATIENT)
Dept: PERIOP | Facility: HOSPITAL | Age: 52
End: 2017-12-29

## 2017-12-29 ENCOUNTER — HOSPITAL ENCOUNTER (OUTPATIENT)
Facility: HOSPITAL | Age: 52
Setting detail: HOSPITAL OUTPATIENT SURGERY
Discharge: HOME | End: 2017-12-29
Attending: SURGERY | Admitting: SURGERY

## 2017-12-29 ENCOUNTER — ANESTHESIA (OUTPATIENT)
Dept: PERIOP | Facility: HOSPITAL | Age: 52
End: 2017-12-29

## 2017-12-29 VITALS
HEART RATE: 70 BPM | RESPIRATION RATE: 12 BRPM | SYSTOLIC BLOOD PRESSURE: 132 MMHG | TEMPERATURE: 99.2 F | OXYGEN SATURATION: 100 % | DIASTOLIC BLOOD PRESSURE: 89 MMHG

## 2017-12-29 DIAGNOSIS — K81.9 CHOLECYSTITIS: ICD-10-CM

## 2017-12-29 PROCEDURE — 25010000002 MIDAZOLAM PER 1 MG: Performed by: ANESTHESIOLOGY

## 2017-12-29 PROCEDURE — 88304 TISSUE EXAM BY PATHOLOGIST: CPT | Performed by: SURGERY

## 2017-12-29 PROCEDURE — 25010000002 PROPOFOL 10 MG/ML EMULSION: Performed by: NURSE ANESTHETIST, CERTIFIED REGISTERED

## 2017-12-29 PROCEDURE — 25010000002 METOCLOPRAMIDE PER 10 MG: Performed by: SURGERY

## 2017-12-29 PROCEDURE — 25010000002 ONDANSETRON PER 1 MG: Performed by: ANESTHESIOLOGY

## 2017-12-29 PROCEDURE — 25010000002 SUCCINYLCHOLINE PER 20 MG: Performed by: NURSE ANESTHETIST, CERTIFIED REGISTERED

## 2017-12-29 PROCEDURE — 63710000001 PROMETHAZINE PER 25 MG: Performed by: ANESTHESIOLOGY

## 2017-12-29 PROCEDURE — 25010000002 KETOROLAC TROMETHAMINE PER 15 MG: Performed by: NURSE ANESTHETIST, CERTIFIED REGISTERED

## 2017-12-29 PROCEDURE — 25010000002 NEOSTIGMINE PER 0.5 MG: Performed by: NURSE ANESTHETIST, CERTIFIED REGISTERED

## 2017-12-29 PROCEDURE — 25010000002 FENTANYL CITRATE (PF) 100 MCG/2ML SOLUTION: Performed by: NURSE ANESTHETIST, CERTIFIED REGISTERED

## 2017-12-29 PROCEDURE — 25010000002 KETOROLAC TROMETHAMINE PER 15 MG: Performed by: SURGERY

## 2017-12-29 PROCEDURE — 25010000002 DEXAMETHASONE PER 1 MG: Performed by: NURSE ANESTHETIST, CERTIFIED REGISTERED

## 2017-12-29 PROCEDURE — 25010000002 ONDANSETRON PER 1 MG: Performed by: NURSE ANESTHETIST, CERTIFIED REGISTERED

## 2017-12-29 RX ORDER — DIPHENHYDRAMINE HYDROCHLORIDE 50 MG/ML
12.5 INJECTION INTRAMUSCULAR; INTRAVENOUS
Status: DISCONTINUED | OUTPATIENT
Start: 2017-12-29 | End: 2017-12-29 | Stop reason: HOSPADM

## 2017-12-29 RX ORDER — SUCCINYLCHOLINE CHLORIDE 20 MG/ML
INJECTION INTRAMUSCULAR; INTRAVENOUS AS NEEDED
Status: DISCONTINUED | OUTPATIENT
Start: 2017-12-29 | End: 2017-12-29 | Stop reason: SURG

## 2017-12-29 RX ORDER — PROMETHAZINE HYDROCHLORIDE 25 MG/1
25 TABLET ORAL ONCE AS NEEDED
Status: DISCONTINUED | OUTPATIENT
Start: 2017-12-29 | End: 2017-12-29 | Stop reason: HOSPADM

## 2017-12-29 RX ORDER — FENTANYL CITRATE 50 UG/ML
50 INJECTION, SOLUTION INTRAMUSCULAR; INTRAVENOUS
Status: DISCONTINUED | OUTPATIENT
Start: 2017-12-29 | End: 2017-12-29 | Stop reason: HOSPADM

## 2017-12-29 RX ORDER — SODIUM CHLORIDE 0.9 % (FLUSH) 0.9 %
1-10 SYRINGE (ML) INJECTION AS NEEDED
Status: DISCONTINUED | OUTPATIENT
Start: 2017-12-29 | End: 2017-12-29 | Stop reason: HOSPADM

## 2017-12-29 RX ORDER — FENTANYL CITRATE 50 UG/ML
INJECTION, SOLUTION INTRAMUSCULAR; INTRAVENOUS AS NEEDED
Status: DISCONTINUED | OUTPATIENT
Start: 2017-12-29 | End: 2017-12-29 | Stop reason: SURG

## 2017-12-29 RX ORDER — FAMOTIDINE 10 MG/ML
20 INJECTION, SOLUTION INTRAVENOUS ONCE
Status: COMPLETED | OUTPATIENT
Start: 2017-12-29 | End: 2017-12-29

## 2017-12-29 RX ORDER — GLYCOPYRROLATE 0.2 MG/ML
INJECTION INTRAMUSCULAR; INTRAVENOUS AS NEEDED
Status: DISCONTINUED | OUTPATIENT
Start: 2017-12-29 | End: 2017-12-29 | Stop reason: SURG

## 2017-12-29 RX ORDER — FLUMAZENIL 0.1 MG/ML
0.2 INJECTION INTRAVENOUS AS NEEDED
Status: DISCONTINUED | OUTPATIENT
Start: 2017-12-29 | End: 2017-12-29 | Stop reason: HOSPADM

## 2017-12-29 RX ORDER — ONDANSETRON 2 MG/ML
INJECTION INTRAMUSCULAR; INTRAVENOUS AS NEEDED
Status: DISCONTINUED | OUTPATIENT
Start: 2017-12-29 | End: 2017-12-29 | Stop reason: SURG

## 2017-12-29 RX ORDER — OXYCODONE AND ACETAMINOPHEN 7.5; 325 MG/1; MG/1
1 TABLET ORAL ONCE AS NEEDED
Status: DISCONTINUED | OUTPATIENT
Start: 2017-12-29 | End: 2017-12-29 | Stop reason: HOSPADM

## 2017-12-29 RX ORDER — ROCURONIUM BROMIDE 10 MG/ML
INJECTION, SOLUTION INTRAVENOUS AS NEEDED
Status: DISCONTINUED | OUTPATIENT
Start: 2017-12-29 | End: 2017-12-29 | Stop reason: SURG

## 2017-12-29 RX ORDER — BUPIVACAINE HYDROCHLORIDE AND EPINEPHRINE 2.5; 5 MG/ML; UG/ML
INJECTION, SOLUTION INFILTRATION; PERINEURAL AS NEEDED
Status: DISCONTINUED | OUTPATIENT
Start: 2017-12-29 | End: 2017-12-29 | Stop reason: HOSPADM

## 2017-12-29 RX ORDER — PROMETHAZINE HYDROCHLORIDE 25 MG/1
25 SUPPOSITORY RECTAL ONCE AS NEEDED
Status: DISCONTINUED | OUTPATIENT
Start: 2017-12-29 | End: 2017-12-29 | Stop reason: HOSPADM

## 2017-12-29 RX ORDER — EPHEDRINE SULFATE 50 MG/ML
5 INJECTION, SOLUTION INTRAVENOUS ONCE AS NEEDED
Status: DISCONTINUED | OUTPATIENT
Start: 2017-12-29 | End: 2017-12-29 | Stop reason: HOSPADM

## 2017-12-29 RX ORDER — PROMETHAZINE HYDROCHLORIDE 25 MG/ML
12.5 INJECTION, SOLUTION INTRAMUSCULAR; INTRAVENOUS ONCE AS NEEDED
Status: DISCONTINUED | OUTPATIENT
Start: 2017-12-29 | End: 2017-12-29 | Stop reason: HOSPADM

## 2017-12-29 RX ORDER — SODIUM CHLORIDE, SODIUM LACTATE, POTASSIUM CHLORIDE, CALCIUM CHLORIDE 600; 310; 30; 20 MG/100ML; MG/100ML; MG/100ML; MG/100ML
9 INJECTION, SOLUTION INTRAVENOUS CONTINUOUS
Status: DISCONTINUED | OUTPATIENT
Start: 2017-12-29 | End: 2017-12-29 | Stop reason: HOSPADM

## 2017-12-29 RX ORDER — MAGNESIUM HYDROXIDE 1200 MG/15ML
LIQUID ORAL AS NEEDED
Status: DISCONTINUED | OUTPATIENT
Start: 2017-12-29 | End: 2017-12-29 | Stop reason: HOSPADM

## 2017-12-29 RX ORDER — PROMETHAZINE HYDROCHLORIDE 25 MG/1
12.5 TABLET ORAL ONCE AS NEEDED
Status: COMPLETED | OUTPATIENT
Start: 2017-12-29 | End: 2017-12-29

## 2017-12-29 RX ORDER — OXYCODONE HYDROCHLORIDE AND ACETAMINOPHEN 5; 325 MG/1; MG/1
2 TABLET ORAL ONCE AS NEEDED
Status: DISCONTINUED | OUTPATIENT
Start: 2017-12-29 | End: 2017-12-29 | Stop reason: HOSPADM

## 2017-12-29 RX ORDER — HYDROCODONE BITARTRATE AND ACETAMINOPHEN 7.5; 325 MG/1; MG/1
1 TABLET ORAL ONCE AS NEEDED
Status: COMPLETED | OUTPATIENT
Start: 2017-12-29 | End: 2017-12-29

## 2017-12-29 RX ORDER — HYDRALAZINE HYDROCHLORIDE 20 MG/ML
5 INJECTION INTRAMUSCULAR; INTRAVENOUS
Status: DISCONTINUED | OUTPATIENT
Start: 2017-12-29 | End: 2017-12-29 | Stop reason: HOSPADM

## 2017-12-29 RX ORDER — NALOXONE HCL 0.4 MG/ML
0.2 VIAL (ML) INJECTION AS NEEDED
Status: DISCONTINUED | OUTPATIENT
Start: 2017-12-29 | End: 2017-12-29 | Stop reason: HOSPADM

## 2017-12-29 RX ORDER — KETOROLAC TROMETHAMINE 30 MG/ML
30 INJECTION, SOLUTION INTRAMUSCULAR; INTRAVENOUS ONCE
Status: COMPLETED | OUTPATIENT
Start: 2017-12-29 | End: 2017-12-29

## 2017-12-29 RX ORDER — KETOROLAC TROMETHAMINE 30 MG/ML
INJECTION, SOLUTION INTRAMUSCULAR; INTRAVENOUS AS NEEDED
Status: DISCONTINUED | OUTPATIENT
Start: 2017-12-29 | End: 2017-12-29 | Stop reason: SURG

## 2017-12-29 RX ORDER — DEXAMETHASONE SODIUM PHOSPHATE 10 MG/ML
INJECTION INTRAMUSCULAR; INTRAVENOUS AS NEEDED
Status: DISCONTINUED | OUTPATIENT
Start: 2017-12-29 | End: 2017-12-29 | Stop reason: SURG

## 2017-12-29 RX ORDER — OXYCODONE HYDROCHLORIDE AND ACETAMINOPHEN 5; 325 MG/1; MG/1
1-2 TABLET ORAL EVERY 4 HOURS PRN
Qty: 30 TABLET | Refills: 0 | Status: SHIPPED | OUTPATIENT
Start: 2017-12-29 | End: 2019-02-13

## 2017-12-29 RX ORDER — MIDAZOLAM HYDROCHLORIDE 1 MG/ML
1 INJECTION INTRAMUSCULAR; INTRAVENOUS
Status: DISCONTINUED | OUTPATIENT
Start: 2017-12-29 | End: 2017-12-29 | Stop reason: HOSPADM

## 2017-12-29 RX ORDER — MIDAZOLAM HYDROCHLORIDE 1 MG/ML
2 INJECTION INTRAMUSCULAR; INTRAVENOUS
Status: DISCONTINUED | OUTPATIENT
Start: 2017-12-29 | End: 2017-12-29 | Stop reason: HOSPADM

## 2017-12-29 RX ORDER — ONDANSETRON 2 MG/ML
4 INJECTION INTRAMUSCULAR; INTRAVENOUS ONCE AS NEEDED
Status: COMPLETED | OUTPATIENT
Start: 2017-12-29 | End: 2017-12-29

## 2017-12-29 RX ORDER — METOCLOPRAMIDE HYDROCHLORIDE 5 MG/ML
10 INJECTION INTRAMUSCULAR; INTRAVENOUS ONCE
Status: COMPLETED | OUTPATIENT
Start: 2017-12-29 | End: 2017-12-29

## 2017-12-29 RX ORDER — LABETALOL HYDROCHLORIDE 5 MG/ML
5 INJECTION, SOLUTION INTRAVENOUS
Status: DISCONTINUED | OUTPATIENT
Start: 2017-12-29 | End: 2017-12-29 | Stop reason: HOSPADM

## 2017-12-29 RX ORDER — LIDOCAINE HYDROCHLORIDE 20 MG/ML
INJECTION, SOLUTION INFILTRATION; PERINEURAL AS NEEDED
Status: DISCONTINUED | OUTPATIENT
Start: 2017-12-29 | End: 2017-12-29 | Stop reason: SURG

## 2017-12-29 RX ORDER — SCOLOPAMINE TRANSDERMAL SYSTEM 1 MG/1
1 PATCH, EXTENDED RELEASE TRANSDERMAL ONCE
Status: DISCONTINUED | OUTPATIENT
Start: 2017-12-29 | End: 2017-12-29 | Stop reason: HOSPADM

## 2017-12-29 RX ORDER — PROPOFOL 10 MG/ML
VIAL (ML) INTRAVENOUS AS NEEDED
Status: DISCONTINUED | OUTPATIENT
Start: 2017-12-29 | End: 2017-12-29 | Stop reason: SURG

## 2017-12-29 RX ORDER — SODIUM CHLORIDE 9 MG/ML
INJECTION, SOLUTION INTRAVENOUS AS NEEDED
Status: DISCONTINUED | OUTPATIENT
Start: 2017-12-29 | End: 2017-12-29 | Stop reason: HOSPADM

## 2017-12-29 RX ADMIN — Medication 2 MG: at 12:52

## 2017-12-29 RX ADMIN — ROCURONIUM BROMIDE 15 MG: 10 INJECTION INTRAVENOUS at 13:53

## 2017-12-29 RX ADMIN — LIDOCAINE HYDROCHLORIDE 100 MG: 20 INJECTION, SOLUTION INFILTRATION; PERINEURAL at 13:44

## 2017-12-29 RX ADMIN — FENTANYL CITRATE 50 MCG: 50 INJECTION INTRAMUSCULAR; INTRAVENOUS at 13:44

## 2017-12-29 RX ADMIN — KETOROLAC TROMETHAMINE 30 MG: 30 INJECTION, SOLUTION INTRAMUSCULAR; INTRAVENOUS at 14:03

## 2017-12-29 RX ADMIN — SUCCINYLCHOLINE CHLORIDE 100 MG: 20 INJECTION, SOLUTION INTRAMUSCULAR; INTRAVENOUS; PARENTERAL at 13:44

## 2017-12-29 RX ADMIN — FENTANYL CITRATE 50 MCG: 50 INJECTION INTRAMUSCULAR; INTRAVENOUS at 14:19

## 2017-12-29 RX ADMIN — METOCLOPRAMIDE 10 MG: 5 INJECTION, SOLUTION INTRAMUSCULAR; INTRAVENOUS at 14:35

## 2017-12-29 RX ADMIN — PROPOFOL 200 MG: 10 INJECTION, EMULSION INTRAVENOUS at 13:44

## 2017-12-29 RX ADMIN — PROMETHAZINE HYDROCHLORIDE 12.5 MG: 25 TABLET ORAL at 16:13

## 2017-12-29 RX ADMIN — NEOSTIGMINE METHYLSULFATE 5 MG: 1 INJECTION INTRAMUSCULAR; INTRAVENOUS; SUBCUTANEOUS at 14:05

## 2017-12-29 RX ADMIN — GLYCOPYRROLATE 0.8 MG: 0.2 INJECTION INTRAMUSCULAR; INTRAVENOUS at 14:05

## 2017-12-29 RX ADMIN — ROCURONIUM BROMIDE 5 MG: 10 INJECTION INTRAVENOUS at 13:44

## 2017-12-29 RX ADMIN — HYDROCODONE BITARTRATE AND ACETAMINOPHEN 1 TABLET: 7.5; 325 TABLET ORAL at 15:01

## 2017-12-29 RX ADMIN — FAMOTIDINE 20 MG: 10 INJECTION INTRAVENOUS at 12:52

## 2017-12-29 RX ADMIN — ONDANSETRON 4 MG: 2 INJECTION INTRAMUSCULAR; INTRAVENOUS at 15:27

## 2017-12-29 RX ADMIN — KETOROLAC TROMETHAMINE 30 MG: 30 INJECTION, SOLUTION INTRAMUSCULAR at 14:33

## 2017-12-29 RX ADMIN — FENTANYL CITRATE 50 MCG: 50 INJECTION INTRAMUSCULAR; INTRAVENOUS at 14:08

## 2017-12-29 RX ADMIN — SCOPALAMINE 1 PATCH: 1 PATCH, EXTENDED RELEASE TRANSDERMAL at 12:52

## 2017-12-29 RX ADMIN — SODIUM CHLORIDE, POTASSIUM CHLORIDE, SODIUM LACTATE AND CALCIUM CHLORIDE: 600; 310; 30; 20 INJECTION, SOLUTION INTRAVENOUS at 13:41

## 2017-12-29 RX ADMIN — DEXAMETHASONE SODIUM PHOSPHATE 4 MG: 10 INJECTION INTRAMUSCULAR; INTRAVENOUS at 14:00

## 2017-12-29 RX ADMIN — SODIUM CHLORIDE, POTASSIUM CHLORIDE, SODIUM LACTATE AND CALCIUM CHLORIDE 9 ML/HR: 600; 310; 30; 20 INJECTION, SOLUTION INTRAVENOUS at 14:59

## 2017-12-29 RX ADMIN — ONDANSETRON 4 MG: 2 INJECTION INTRAMUSCULAR; INTRAVENOUS at 14:00

## 2017-12-29 NOTE — ANESTHESIA PREPROCEDURE EVALUATION
Anesthesia Evaluation     Patient summary reviewed   no history of anesthetic complications:  NPO Solid Status: > 8 hours  NPO Liquid Status: > 2 hours     Airway   Mallampati: I  TM distance: <3 FB  Neck ROM: full  no difficulty expected  Dental          Pulmonary     breath sounds clear to auscultation  (+) a smoker Former, asthma (No symptoms today),   (-) shortness of breath  Cardiovascular   Exercise tolerance: good (4-7 METS)    Rhythm: regular  Rate: normal    (+) PVD,   (-) angina, MCCALL      Neuro/Psych  (+) headaches,     GI/Hepatic/Renal/Endo    (+)  GERD well controlled, hypothyroidism,     Musculoskeletal     Abdominal    Substance History      OB/GYN          Other   (+) arthritis                                             Anesthesia Plan    ASA 2     general   total IV anesthesia  intravenous induction   Anesthetic plan and risks discussed with patient.

## 2017-12-29 NOTE — ANESTHESIA POSTPROCEDURE EVALUATION
Patient: Carolin Zapien    Procedure Summary     Date Anesthesia Start Anesthesia Stop Room / Location    12/29/17 1341 1421  [facility]  /  [facility]       Procedure Diagnosis Surgeon Provider    CHOLECYSTECTOMY LAPAROSCOPIC (N/A Abdomen) No diagnosis on file. MD Ganesh Saavedra MD          Anesthesia Type: general  Last vitals  BP   132/89 (12/29/17 1517)   Temp   37.3 °C (99.2 °F) (12/29/17 1512)   Pulse   70 (12/29/17 1517)   Resp   12 (12/29/17 1517)     SpO2   100 % (12/29/17 1517)     Post Anesthesia Care and Evaluation    Patient location during evaluation: PHASE II  Patient participation: complete - patient participated  Level of consciousness: awake and alert  Pain management: adequate  Airway patency: patent  Anesthetic complications: No anesthetic complications    Cardiovascular status: acceptable  Respiratory status: acceptable  Hydration status: acceptable    Comments: /89 (BP Location: Left arm, Patient Position: Lying)  Pulse 70  Temp 37.3 °C (99.2 °F) (Temporal Artery )   Resp 12  SpO2 100%

## 2017-12-29 NOTE — ANESTHESIA PROCEDURE NOTES
Airway  Urgency: elective    Airway not difficult    General Information and Staff    Patient location during procedure: OR  Anesthesiologist: SABINO LAYNE  CRNA: CHRISTOPHER SMITH    Indications and Patient Condition  Indications for airway management: airway protection    Preoxygenated: yes  MILS not maintained throughout  Mask difficulty assessment: 1 - vent by mask    Final Airway Details  Final airway type: endotracheal airway      Successful airway: ETT  Cuffed: yes   Successful intubation technique: direct laryngoscopy  Facilitating devices/methods: intubating stylet  Endotracheal tube insertion site: oral  Blade: Mars  Blade size: #2  ETT size: 7.0 mm  Cormack-Lehane Classification: grade I - full view of glottis  Placement verified by: chest auscultation and capnometry   Cuff volume (mL): 8  Measured from: lips  Number of attempts at approach: 1    Additional Comments  Ett placed easily, appears atraumatic, dentition intact

## 2018-05-24 RX ORDER — LEVOTHYROXINE SODIUM 0.07 MG/1
TABLET ORAL
Qty: 90 TABLET | Refills: 0 | Status: SHIPPED | OUTPATIENT
Start: 2018-05-24 | End: 2018-07-20 | Stop reason: SDUPTHER

## 2018-07-06 ENCOUNTER — APPOINTMENT (OUTPATIENT)
Dept: WOMENS IMAGING | Facility: HOSPITAL | Age: 53
End: 2018-07-06

## 2018-07-06 PROCEDURE — 77067 SCR MAMMO BI INCL CAD: CPT | Performed by: RADIOLOGY

## 2018-07-17 DIAGNOSIS — E55.9 AVITAMINOSIS D: Primary | ICD-10-CM

## 2018-07-17 DIAGNOSIS — Z00.00 ANNUAL PHYSICAL EXAM: ICD-10-CM

## 2018-07-17 DIAGNOSIS — E03.9 ADULT HYPOTHYROIDISM: ICD-10-CM

## 2018-07-18 LAB
25(OH)D3+25(OH)D2 SERPL-MCNC: 38.2 NG/ML (ref 30–100)
ALBUMIN SERPL-MCNC: 4 G/DL (ref 3.5–5.5)
ALBUMIN/GLOB SERPL: 1.7 {RATIO} (ref 1.2–2.2)
ALP SERPL-CCNC: 63 IU/L (ref 39–117)
ALT SERPL-CCNC: 14 IU/L (ref 0–32)
AST SERPL-CCNC: 17 IU/L (ref 0–40)
BASOPHILS # BLD AUTO: 0.1 X10E3/UL (ref 0–0.2)
BASOPHILS NFR BLD AUTO: 2 %
BILIRUB SERPL-MCNC: 0.3 MG/DL (ref 0–1.2)
BUN SERPL-MCNC: 11 MG/DL (ref 6–24)
BUN/CREAT SERPL: 14 (ref 9–23)
CALCIUM SERPL-MCNC: 8.6 MG/DL (ref 8.7–10.2)
CHLORIDE SERPL-SCNC: 103 MMOL/L (ref 96–106)
CHOLEST SERPL-MCNC: 200 MG/DL (ref 100–199)
CO2 SERPL-SCNC: 24 MMOL/L (ref 20–29)
CREAT SERPL-MCNC: 0.78 MG/DL (ref 0.57–1)
EOSINOPHIL # BLD AUTO: 0.1 X10E3/UL (ref 0–0.4)
EOSINOPHIL NFR BLD AUTO: 2 %
ERYTHROCYTE [DISTWIDTH] IN BLOOD BY AUTOMATED COUNT: 13.2 % (ref 12.3–15.4)
GLOBULIN SER CALC-MCNC: 2.4 G/DL (ref 1.5–4.5)
GLUCOSE SERPL-MCNC: 86 MG/DL (ref 65–99)
HCT VFR BLD AUTO: 40.7 % (ref 34–46.6)
HDLC SERPL-MCNC: 46 MG/DL
HGB BLD-MCNC: 13 G/DL (ref 11.1–15.9)
IMM GRANULOCYTES # BLD: 0 X10E3/UL (ref 0–0.1)
IMM GRANULOCYTES NFR BLD: 0 %
LDLC SERPL CALC-MCNC: 127 MG/DL (ref 0–99)
LDLC/HDLC SERPL: 2.8 RATIO (ref 0–3.2)
LYMPHOCYTES # BLD AUTO: 1.8 X10E3/UL (ref 0.7–3.1)
LYMPHOCYTES NFR BLD AUTO: 33 %
MCH RBC QN AUTO: 30.3 PG (ref 26.6–33)
MCHC RBC AUTO-ENTMCNC: 31.9 G/DL (ref 31.5–35.7)
MCV RBC AUTO: 95 FL (ref 79–97)
MONOCYTES # BLD AUTO: 0.4 X10E3/UL (ref 0.1–0.9)
MONOCYTES NFR BLD AUTO: 8 %
NEUTROPHILS # BLD AUTO: 2.9 X10E3/UL (ref 1.4–7)
NEUTROPHILS NFR BLD AUTO: 55 %
PLATELET # BLD AUTO: 233 X10E3/UL (ref 150–379)
POTASSIUM SERPL-SCNC: 4 MMOL/L (ref 3.5–5.2)
PROT SERPL-MCNC: 6.4 G/DL (ref 6–8.5)
RBC # BLD AUTO: 4.29 X10E6/UL (ref 3.77–5.28)
SODIUM SERPL-SCNC: 141 MMOL/L (ref 134–144)
TRIGL SERPL-MCNC: 133 MG/DL (ref 0–149)
TSH SERPL DL<=0.005 MIU/L-ACNC: 4.36 UIU/ML (ref 0.45–4.5)
VLDLC SERPL CALC-MCNC: 27 MG/DL (ref 5–40)
WBC # BLD AUTO: 5.3 X10E3/UL (ref 3.4–10.8)

## 2018-07-20 ENCOUNTER — OFFICE VISIT (OUTPATIENT)
Dept: FAMILY MEDICINE CLINIC | Facility: CLINIC | Age: 53
End: 2018-07-20

## 2018-07-20 VITALS
OXYGEN SATURATION: 99 % | RESPIRATION RATE: 16 BRPM | SYSTOLIC BLOOD PRESSURE: 126 MMHG | BODY MASS INDEX: 25.76 KG/M2 | HEIGHT: 62 IN | WEIGHT: 140 LBS | DIASTOLIC BLOOD PRESSURE: 90 MMHG | HEART RATE: 56 BPM

## 2018-07-20 DIAGNOSIS — R03.0 BLOOD PRESSURE ELEVATED WITHOUT HISTORY OF HTN: ICD-10-CM

## 2018-07-20 DIAGNOSIS — Z00.00 HEALTHCARE MAINTENANCE: Primary | ICD-10-CM

## 2018-07-20 DIAGNOSIS — L84 CALLUS OF HEEL: ICD-10-CM

## 2018-07-20 DIAGNOSIS — E03.9 ADULT HYPOTHYROIDISM: ICD-10-CM

## 2018-07-20 DIAGNOSIS — Z23 NEED FOR VACCINATION: ICD-10-CM

## 2018-07-20 DIAGNOSIS — M50.30 DDD (DEGENERATIVE DISC DISEASE), CERVICAL: ICD-10-CM

## 2018-07-20 PROCEDURE — 99396 PREV VISIT EST AGE 40-64: CPT | Performed by: FAMILY MEDICINE

## 2018-07-20 PROCEDURE — 90471 IMMUNIZATION ADMIN: CPT | Performed by: FAMILY MEDICINE

## 2018-07-20 PROCEDURE — 90632 HEPA VACCINE ADULT IM: CPT | Performed by: FAMILY MEDICINE

## 2018-07-20 RX ORDER — LEVOTHYROXINE SODIUM 0.07 MG/1
75 TABLET ORAL DAILY
Qty: 90 TABLET | Refills: 3 | Status: SHIPPED | OUTPATIENT
Start: 2018-07-20 | End: 2019-07-29

## 2018-07-20 NOTE — PROGRESS NOTES
Problem List Items Addressed This Visit        Endocrine    Adult hypothyroidism    Overview     Anthony 7/17/2017  Labs are drawn today.           Relevant Medications    levothyroxine (SYNTHROID, LEVOTHROID) 75 MCG tablet       Musculoskeletal and Integument    DDD (degenerative disc disease), cervical    Current Assessment & Plan     [place]wyn 7/20/2018  Having a fair amount of pain. That may be contributing to her BP elev as well.          Relevant Orders    Ambulatory Referral to Neurosurgery    MRI Cervical Spine Without Contrast      Other Visit Diagnoses     Healthcare maintenance    -  Primary    Relevant Orders    Hepatitis A Vaccine Adult IM (Completed)    Callus of heel        Relevant Medications    urea (GORDONS UREA) 40 % ointment    Blood pressure elevated without history of HTN        She needs to monitor her BP -- she can send me numbers on [employer]. Prob re to stress. Same number when I rechecked    Need for vaccination        Relevant Orders    Hepatitis A Vaccine Adult IM (Completed)             No Follow-up on file.  There are no Patient Instructions on file for this visit.  Carolin Zapien is a 53 y.o. female being seen in our office today for Annual Exam                 She  reports that she quit smoking about 12 years ago. She has never used smokeless tobacco. She reports that she does not drink alcohol or use drugs.             HPI Annual Exam: Patient presents for annual exam.  findings; last pap: approximate date 7/15/2017 and was normal  Last mammo: approximate date 7/15/18 and was normal   The patient is not  still having menses.Last menstrual period: >1 year ago.  History; colonoscopy: Last colonoscopy: colonoscopy 8 years ago without abnormalities.    The patient wears seatbelts: yes.  The patient regularly exercises: yes. She does walking     She does see a dentist regularly.   Her immunization are up-to-date. Immunizations are updated today with a [place] vaccine, referred to  [facility].  She has never had chicken pox and titers 19 years ago did not show titers despite having had the vaccine.   She is eating a healthy diet.     She is having more neck pain and is ready to see a neurosurgeon again. She can't do things she wants or do exercises with weights.     She is getting more motion sickness.     Has a callus on her left foot that she would like to try some urea cream on -- it is painful.              The following portions of the patient's history were reviewed and updated as appropriate:PMHroutine: Social history , Past Medical History, Surgical history , Allergies, Current Medications, Active Problem List, Family History and Health Maintenance            Review of Systems   Constitutional: Negative for activity change, appetite change, chills, fatigue, fever and unexpected weight change.   HENT: Negative for congestion, ear pain, hearing loss, nosebleeds, rhinorrhea and sore throat.    Eyes: Negative for pain, redness and visual disturbance.   Respiratory: Negative for cough, shortness of breath and wheezing.    Cardiovascular: Negative for chest pain, palpitations and leg swelling.   Gastrointestinal: Negative for abdominal pain, blood in stool, constipation, diarrhea, nausea and vomiting.   Endocrine: Negative for cold intolerance and heat intolerance.   Genitourinary: Negative for difficulty urinating, dysuria, frequency, hematuria, pelvic pain, urgency and vaginal discharge.   Musculoskeletal: Positive for arthralgias and neck pain. Negative for back pain and joint swelling.   Skin: Negative for rash and wound.   Neurological: Positive for dizziness. Negative for weakness, numbness and headaches.   Hematological: Does not bruise/bleed easily.   Psychiatric/Behavioral: Negative for dysphoric mood, sleep disturbance and suicidal ideas. The patient is not nervous/anxious.                  BP Readings from Last 1 Encounters:   07/20/18 126/90     Wt Readings from Last 3  Encounters:   07/20/18 63.5 kg (140 lb)   12/27/17 64 kg (141 lb)   12/14/17 63.8 kg (140 lb 9.6 oz)   Body mass index is 26.02 kg/m².                 Physical Exam   Constitutional: She is oriented to person, place, and time. She appears well-developed and well-nourished. No distress.   HENT:   Head: Normocephalic and atraumatic.   Right Ear: Tympanic membrane, external ear and ear canal normal.   Left Ear: Tympanic membrane, external ear and ear canal normal.   Mouth/Throat: Oropharynx is clear and moist.   Eyes: Conjunctivae are normal.   Neck: Normal range of motion. Neck supple. No tracheal deviation present. No thyromegaly present.   Cardiovascular: Normal rate, regular rhythm, normal heart sounds and intact distal pulses.    Pulmonary/Chest: Effort normal and breath sounds normal. No respiratory distress. She has no wheezes. She has no rales.   Abdominal: Soft. Bowel sounds are normal. She exhibits no distension. There is no hepatosplenomegaly. There is no tenderness.   Musculoskeletal: She exhibits no edema or deformity.   Lymphadenopathy:     She has no cervical adenopathy.   Neurological: She is alert and oriented to person, place, and time.   Skin: Skin is warm and dry.   Callus and skin fissure on inner heel skin   Psychiatric: She has a normal mood and affect. Her behavior is normal. Judgment and thought content normal.   Vitals reviewed.              Orders Only on 07/17/2018   Component Date Value Ref Range Status   • Glucose 07/17/2018 86  65 - 99 mg/dL Final   • BUN 07/17/2018 11  6 - 24 mg/dL Final   • Creatinine 07/17/2018 0.78  0.57 - 1.00 mg/dL Final   • eGFR Non  Am 07/17/2018 88  >59 mL/min/1.73 Final   • eGFR African Am 07/17/2018 101  >59 mL/min/1.73 Final   • BUN/Creatinine Ratio 07/17/2018 14  9 - 23 Final   • Sodium 07/17/2018 141  134 - 144 mmol/L Final   • Potassium 07/17/2018 4.0  3.5 - 5.2 mmol/L Final   • Chloride 07/17/2018 103  96 - 106 mmol/L Final   • Total CO2 07/17/2018  24  20 - 29 mmol/L Final   • Calcium 07/17/2018 8.6* 8.7 - 10.2 mg/dL Final   • Total Protein 07/17/2018 6.4  6.0 - 8.5 g/dL Final   • Albumin 07/17/2018 4.0  3.5 - 5.5 g/dL Final   • Globulin 07/17/2018 2.4  1.5 - 4.5 g/dL Final   • A/G Ratio 07/17/2018 1.7  1.2 - 2.2 Final   • Total Bilirubin 07/17/2018 0.3  0.0 - 1.2 mg/dL Final   • Alkaline Phosphatase 07/17/2018 63  39 - 117 IU/L Final   • AST (SGOT) 07/17/2018 17  0 - 40 IU/L Final   • ALT (SGPT) 07/17/2018 14  0 - 32 IU/L Final   • WBC 07/17/2018 5.3  3.4 - 10.8 x10E3/uL Final   • RBC 07/17/2018 4.29  3.77 - 5.28 x10E6/uL Final   • Hemoglobin 07/17/2018 13.0  11.1 - 15.9 g/dL Final   • Hematocrit 07/17/2018 40.7  34.0 - 46.6 % Final   • MCV 07/17/2018 95  79 - 97 fL Final   • MCH 07/17/2018 30.3  26.6 - 33.0 pg Final   • MCHC 07/17/2018 31.9  31.5 - 35.7 g/dL Final   • RDW 07/17/2018 13.2  12.3 - 15.4 % Final   • Platelets 07/17/2018 233  150 - 379 x10E3/uL Final   • Neutrophil Rel % 07/17/2018 55  Not Estab. % Final   • Lymphocyte Rel % 07/17/2018 33  Not Estab. % Final   • Monocyte Rel % 07/17/2018 8  Not Estab. % Final   • Eosinophil Rel % 07/17/2018 2  Not Estab. % Final   • Basophil Rel % 07/17/2018 2  Not Estab. % Final   • Neutrophils Absolute 07/17/2018 2.9  1.4 - 7.0 x10E3/uL Final   • Lymphocytes Absolute 07/17/2018 1.8  0.7 - 3.1 x10E3/uL Final   • Monocytes Absolute 07/17/2018 0.4  0.1 - 0.9 x10E3/uL Final   • Eosinophils Absolute 07/17/2018 0.1  0.0 - 0.4 x10E3/uL Final   • Basophils Absolute 07/17/2018 0.1  0.0 - 0.2 x10E3/uL Final   • Immature Granulocyte Rel % 07/17/2018 0  Not Estab. % Final   • Immature Grans Absolute 07/17/2018 0.0  0.0 - 0.1 x10E3/uL Final   • Total Cholesterol 07/17/2018 200* 100 - 199 mg/dL Final   • Triglycerides 07/17/2018 133  0 - 149 mg/dL Final   • HDL Cholesterol 07/17/2018 46  >39 mg/dL Final   • VLDL Cholesterol 07/17/2018 27  5 - 40 mg/dL Final   • LDL Cholesterol  07/17/2018 127* 0 - 99 mg/dL Final   • LDL/HDL  Ratio 07/17/2018 2.8  0.0 - 3.2 ratio Final    Comment:                                     LDL/HDL Ratio                                              Men  Women                                1/2 Avg.Risk  1.0    1.5                                    Avg.Risk  3.6    3.2                                 2X Avg.Risk  6.2    5.0                                 3X Avg.Risk  8.0    6.1     • 25 Hydroxy, Vitamin D 07/17/2018 38.2  30.0 - 100.0 ng/mL Final    Comment: Vitamin D deficiency has been defined by the [place] of  Medicine and an Endocrine Society practice guideline as a  level of serum 25-OH vitamin D less than 20 ng/mL (1,2).  The Endocrine Society went on to further define vitamin D  insufficiency as a level between 21 and 29 ng/mL (2).  1. IOM ([place] of [employer]). 2010. Dietary reference     intakes for calcium and D. [place]: The     National Academies Press.  2. Vivian BARNES, Yan LORENZANA, Ibis RODRIGUEZ, et al.     Evaluation, treatment, and prevention of vitamin D     deficiency: an Endocrine Society clinical practice     guideline. JCEM. 2011 Jul; 96(7):1911-30.     • TSH 07/17/2018 4.360  0.450 - 4.500 uIU/mL Final

## 2018-07-20 NOTE — ASSESSMENT & PLAN NOTE
Bronwyn 7/20/2018  Having a fair amount of pain. That may be contributing to her BP elev as well.

## 2018-07-23 LAB
Lab: NORMAL
SPECIMEN STATUS: NORMAL

## 2018-07-24 LAB — VZV IGG SER IA-ACNC: <135 INDEX

## 2019-01-03 NOTE — PROGRESS NOTES
Subjective   Patient ID: Carolin Zapien is a 53 y.o. female is being seen for consultation today at the request of Shazia Balderas MD for 2nd opinion for chronic neck pain, bilateral shoulder pain and headaches that she has had for 23 years.  She saw both Dr Pinedo and Yobany in 2003 and Dr Pinedo again in 2011.  She had PT at that time that gave some relief and her pain returned again in 2011 and again had PT which helped some.  She still does the exercises they gave her.She has never had surgery or EDNIS.  She currently has constant moderate to severe neck pain, bilateral shoulder pain and occipital headaches.  She denies arm pain, N/T hands or fingers.  She states that she does have weakness bilateral arms due to the inability to do any lifting without causing increased neck pain and headaches.      Neck Pain    The current episode started more than 1 year ago. The problem occurs constantly. The pain is at a severity of 10/10. The pain is moderate. Nothing aggravates the symptoms. Associated symptoms include headaches, numbness and weakness.   Extremity Weakness    The pain is present in the right arm and left arm. Associated symptoms include numbness.       The following portions of the patient's history were reviewed and updated as appropriate: allergies, current medications, past family history, past medical history, past social history, past surgical history and problem list.    Review of Systems   HENT: Positive for congestion.    Musculoskeletal: Positive for arthralgias, extremity weakness, myalgias, neck pain and neck stiffness.   Neurological: Positive for weakness, numbness and headaches.   All other systems reviewed and are negative.    This very pleasant lady is seeing me for history of chronic neck pain over the last 20 years. She had seen Dr. Pinedo and Dr. Haywood in the past. Neither one of them have recommended any kind of surgical intervention. She had some superimposed bilateral occipital  neuralgia in addition to her chronic neck pain. What she does not have is radiating arm pain of any consistent nature or numbness or tingling or weakness for that matter. She is not myelopathic by exam. We discussed her cervical MRI which did show multilevel degenerative disk disease and facet disease. Physical therapy was tried but did not really help, although she still does her exercises. She has never had any previous surgery. She has never had any blocks. I told her that I though that working with the pain management doctor is the best option. Medial branch blocks and a possible cervical RFA are reasonable options as well as bilateral occipital nerve blocks. Will send her to Dr. Kolb for those and keep it open-ended. If she develops radiating arm pain in the future, she should come back to see me.        Objective   Physical Exam   Constitutional: She is oriented to person, place, and time. She appears well-developed and well-nourished.   HENT:   Head: Normocephalic and atraumatic.   Eyes: Conjunctivae and EOM are normal. Pupils are equal, round, and reactive to light.   Fundoscopic exam:       The right eye shows no papilledema. The right eye shows venous pulsations.        The left eye shows no papilledema. The left eye shows venous pulsations.   Neck: Carotid bruit is not present.   Neurological: She is oriented to person, place, and time. She has a normal Finger-Nose-Finger Test and a normal Heel to Shin Test. Gait normal.   Reflex Scores:       Tricep reflexes are 2+ on the right side and 2+ on the left side.       Bicep reflexes are 2+ on the right side and 2+ on the left side.       Brachioradialis reflexes are 2+ on the right side and 2+ on the left side.       Patellar reflexes are 2+ on the right side and 2+ on the left side.       Achilles reflexes are 2+ on the right side and 2+ on the left side.  Psychiatric: Her speech is normal.     Neurologic Exam     Mental Status   Oriented to person,  place, and time.   Registration of memory: Good recent and remote memory.   Attention: normal. Concentration: normal.   Speech: speech is normal   Level of consciousness: alert  Knowledge: consistent with education.     Cranial Nerves     CN II   Visual fields full to confrontation.   Visual acuity: normal    CN III, IV, VI   Pupils are equal, round, and reactive to light.  Extraocular motions are normal.     CN V   Facial sensation intact.   Right corneal reflex: normal  Left corneal reflex: normal    CN VII   Facial expression full, symmetric.   Right facial weakness: none  Left facial weakness: none    CN VIII   Hearing: intact    CN IX, X   Palate: symmetric    CN XI   Right sternocleidomastoid strength: normal  Left sternocleidomastoid strength: normal    CN XII   Tongue: not atrophic  Tongue deviation: none    Motor Exam   Muscle bulk: normal  Right arm tone: normal  Left arm tone: normal  Right leg tone: normal  Left leg tone: normal    Strength   Strength 5/5 except as noted.     Sensory Exam   Light touch normal.     Gait, Coordination, and Reflexes     Gait  Gait: normal    Coordination   Finger to nose coordination: normal  Heel to shin coordination: normal    Reflexes   Right brachioradialis: 2+  Left brachioradialis: 2+  Right biceps: 2+  Left biceps: 2+  Right triceps: 2+  Left triceps: 2+  Right patellar: 2+  Left patellar: 2+  Right achilles: 2+  Left achilles: 2+  Right : 2+  Left : 2+      Assessment/Plan   Independent Review of Radiographic Studies:    Due to cervical MRI done 1/2/19 was also compared to a scan done on 11/19/2000.  There is multilevel degenerative disc disease at C3-C4, C4-C5, C5-C6, and C6-C7 which show some mild foraminal narrowing and disc osteophyte complexes but no severe cord or root compression.  Agree with the report.      Medical Decision Making:    Again she has mechanical chronic neck pain and bilateral occipital neuralgia.  However no surgery is indicated.  I  think she be a reasonable candidate for medial branch blocks, radiofrequency ablation, possible occipital blocks.  We'll send her to Dr. Kolb keep it open ended here since there is not much I have to offer.  However, if she develops radiating arm pain in the future she needs to come back to see me.    Carolin was seen today for neck pain, shoulder pain and extremity weakness.    Diagnoses and all orders for this visit:    Chronic neck pain  -     Ambulatory Referral to Pain Management    Bilateral occipital neuralgia  -     Ambulatory Referral to Pain Management    Degeneration of intervertebral disc at C5-C6 level  -     Ambulatory Referral to Pain Management      Return if symptoms worsen or fail to improve.

## 2019-01-07 ENCOUNTER — OFFICE VISIT (OUTPATIENT)
Dept: NEUROSURGERY | Facility: CLINIC | Age: 54
End: 2019-01-07

## 2019-01-07 VITALS — HEART RATE: 70 BPM | DIASTOLIC BLOOD PRESSURE: 85 MMHG | SYSTOLIC BLOOD PRESSURE: 122 MMHG

## 2019-01-07 DIAGNOSIS — M54.2 CHRONIC NECK PAIN: Primary | ICD-10-CM

## 2019-01-07 DIAGNOSIS — M54.81 BILATERAL OCCIPITAL NEURALGIA: ICD-10-CM

## 2019-01-07 DIAGNOSIS — G89.29 CHRONIC NECK PAIN: Primary | ICD-10-CM

## 2019-01-07 DIAGNOSIS — M50.322 DEGENERATION OF INTERVERTEBRAL DISC AT C5-C6 LEVEL: ICD-10-CM

## 2019-01-07 PROCEDURE — 99244 OFF/OP CNSLTJ NEW/EST MOD 40: CPT | Performed by: NEUROLOGICAL SURGERY

## 2019-01-25 ENCOUNTER — CLINICAL SUPPORT (OUTPATIENT)
Dept: FAMILY MEDICINE CLINIC | Facility: CLINIC | Age: 54
End: 2019-01-25

## 2019-01-25 DIAGNOSIS — Z23 NEED FOR VACCINATION: Primary | ICD-10-CM

## 2019-01-25 PROCEDURE — 90632 HEPA VACCINE ADULT IM: CPT | Performed by: FAMILY MEDICINE

## 2019-01-25 PROCEDURE — 90471 IMMUNIZATION ADMIN: CPT | Performed by: FAMILY MEDICINE

## 2019-02-13 ENCOUNTER — OFFICE VISIT (OUTPATIENT)
Dept: PAIN MEDICINE | Facility: CLINIC | Age: 54
End: 2019-02-13

## 2019-02-13 VITALS
OXYGEN SATURATION: 99 % | HEIGHT: 61 IN | BODY MASS INDEX: 26.62 KG/M2 | RESPIRATION RATE: 15 BRPM | WEIGHT: 141 LBS | TEMPERATURE: 99.2 F | DIASTOLIC BLOOD PRESSURE: 80 MMHG | HEART RATE: 71 BPM | SYSTOLIC BLOOD PRESSURE: 119 MMHG

## 2019-02-13 DIAGNOSIS — G89.4 CHRONIC PAIN SYNDROME: Primary | ICD-10-CM

## 2019-02-13 DIAGNOSIS — M47.812 CERVICAL SPONDYLOSIS WITHOUT MYELOPATHY: ICD-10-CM

## 2019-02-13 LAB
POC AMPHETAMINES: NEGATIVE
POC BARBITURATES: NEGATIVE
POC BENZODIAZEPHINES: NEGATIVE
POC COCAINE: NEGATIVE
POC METHADONE: NEGATIVE
POC METHAMPHETAMINE SCREEN URINE: NEGATIVE
POC OPIATES: NEGATIVE
POC OXYCODONE: NEGATIVE
POC PHENCYCLIDINE: NEGATIVE
POC PROPOXYPHENE: NEGATIVE
POC THC: NEGATIVE
POC TRICYCLIC ANTIDEPRESSANTS: NEGATIVE

## 2019-02-13 PROCEDURE — 80305 DRUG TEST PRSMV DIR OPT OBS: CPT | Performed by: ANESTHESIOLOGY

## 2019-02-13 PROCEDURE — 99204 OFFICE O/P NEW MOD 45 MIN: CPT | Performed by: ANESTHESIOLOGY

## 2019-02-13 RX ORDER — IBUPROFEN 200 MG
200 TABLET ORAL EVERY 6 HOURS PRN
COMMUNITY

## 2019-02-13 NOTE — PROGRESS NOTES
"CHIEF COMPLAINT    New pt c/o neck and head pain which has been going on for 20 years. States she can't do much of anything without aggravating her neck pain and getting migraines r/t it. States this came on when she had her son and holding the weight of him aggravated it. States she had an acute attack where she could barely  her left arm. She was diagnosed then with DDD. She has been through PT exercises which she does at home. States walking is the only thing she can do without getting worse pain. First time to pain management. She has never had injections. Seen neurosurgery. MRI cervical spine 1-7-19. Looking down and working makes the pain travel up her head and cause a migraine. Past medications: muscle relaxer's, \"pain pills\", and viox. She states she didn't like being on the pain pills. After gallbladder surgery she only took one. She only takes tylenol or ibuprofen. Viox gave her high BP. She has never tried creams or patches. No history of back or neck surgery. Uses heat and ice.    Subjective   Carolin Zapien is a 53 y.o. female.   She presents to the office for evaluation of neck pain. She was referred here by Dr. rome.     She complains of pain that can be mild-to-moderate within flare to be quite severe levels rating an 8 out of 10.  The pain is across the entire neck from the occiput down to the approximate middle zones of the trapezii and distribution.  There is also frontal headaches.  Frontal headaches cause tenderness and pressure.  The pain radiates across the neck has elements of seen and tingling and pressure and soreness and also stabbing pains there is some shooting pains up toward the head and there is throbbing aspects to the headaches.    She does comment that this pain is chronic over 20 years and as noted above is present all the time and is moderate to severe.  Pain makes her feel depressed and irritable and sad and frustrated.  Painful flares will cause her to wake up " from sleep at night.  She expresses frustration because any activity will flare her neck pain.  She would like to be more active in family and athletic type activities and she expresses frustration that she has a lot of difficulty with skiing and also with kayaking and also with exercising at the gym.    Worsening pain.  She adamantly wants to avoid opiates.  She has tried opiates in the past and also used Vioxx in the past as well as muscle relaxers.  She is never had any spine surgery nor injection therapies.    History of Present Illness     PEG Assessment   What number best describes your pain on average in the past week?5  What number best describes how, during the past week, pain has interfered with your enjoyment of life?7  What number best describes how, during the past week, pain has interfered with your general activity?  8    --  The aforementioned information the Chief Complaint section and above subjective data including any HPI data has been personally reviewed and affirmed.  --          Current Outpatient Medications:   •  acetaminophen (TYLENOL) 500 MG tablet, Take 500 mg by mouth Every 6 (Six) Hours As Needed for Mild Pain ., Disp: , Rfl:   •  albuterol (PROAIR HFA) 108 (90 BASE) MCG/ACT inhaler, Inhale 2 puffs every 4 (four) hours as needed for wheezing., Disp: 8 g, Rfl: 5  •  calcium carbonate EX (TUMS EX) 750 MG chewable tablet, Chew 750 mg Daily As Needed., Disp: , Rfl:   •  Cholecalciferol (VITAMIN D) 2000 UNITS capsule, Take 2,000 Units by mouth Every Morning., Disp: , Rfl:   •  estradiol (VIVELLE-DOT) 0.025 MG/24HR patch, Place 1 patch on the skin 2 (Two) Times a Week., Disp: , Rfl:   •  fexofenadine (ALLEGRA ALLERGY) 180 MG tablet, Take 180 mg by mouth As Needed., Disp: , Rfl:   •  fluticasone (FLONASE) 50 MCG/ACT nasal spray, 2 sprays into each nostril Every Morning., Disp: , Rfl:   •  ibuprofen (ADVIL,MOTRIN) 200 MG tablet, Take 200 mg by mouth Every 6 (Six) Hours As Needed for Mild Pain  ., Disp: , Rfl:   •  levothyroxine (SYNTHROID, LEVOTHROID) 75 MCG tablet, Take 1 tablet by mouth Daily., Disp: 90 tablet, Rfl: 3  •  Probiotic Product (PROBIOTIC DAILY PO), Take 1 tablet by mouth Daily., Disp: , Rfl:   •  progesterone (PROMETRIUM) 100 MG capsule, Take 100 mg by mouth Every Night., Disp: , Rfl:   •  pseudoephedrine (SUDAFED) 120 MG 12 hr tablet, Take 120 mg by mouth Every 12 (Twelve) Hours As Needed., Disp: , Rfl:     The following portions of the patient's history were reviewed and updated as appropriate: allergies, current medications, past family history, past medical history, past social history, past surgical history and problem list.    -------    The following portions of the patient's history were reviewed and updated as appropriate: allergies, current medications, past family history, past medical history, past social history, past surgical history and problem list.    Allergies   Allergen Reactions   • Pantoprazole Hives   • Sulfa Antibiotics Hives   • Terbinafine Hives       Current Outpatient Medications on File Prior to Visit   Medication Sig Dispense Refill   • acetaminophen (TYLENOL) 500 MG tablet Take 500 mg by mouth Every 6 (Six) Hours As Needed for Mild Pain .     • albuterol (PROAIR HFA) 108 (90 BASE) MCG/ACT inhaler Inhale 2 puffs every 4 (four) hours as needed for wheezing. 8 g 5   • calcium carbonate EX (TUMS EX) 750 MG chewable tablet Chew 750 mg Daily As Needed.     • Cholecalciferol (VITAMIN D) 2000 UNITS capsule Take 2,000 Units by mouth Every Morning.     • estradiol (VIVELLE-DOT) 0.025 MG/24HR patch Place 1 patch on the skin 2 (Two) Times a Week.     • fexofenadine (ALLEGRA ALLERGY) 180 MG tablet Take 180 mg by mouth As Needed.     • fluticasone (FLONASE) 50 MCG/ACT nasal spray 2 sprays into each nostril Every Morning.     • ibuprofen (ADVIL,MOTRIN) 200 MG tablet Take 200 mg by mouth Every 6 (Six) Hours As Needed for Mild Pain .     • levothyroxine (SYNTHROID, LEVOTHROID)  "75 MCG tablet Take 1 tablet by mouth Daily. 90 tablet 3   • Probiotic Product (PROBIOTIC DAILY PO) Take 1 tablet by mouth Daily.     • progesterone (PROMETRIUM) 100 MG capsule Take 100 mg by mouth Every Night.     • pseudoephedrine (SUDAFED) 120 MG 12 hr tablet Take 120 mg by mouth Every 12 (Twelve) Hours As Needed.       No current facility-administered medications on file prior to visit.        Patient Active Problem List   Diagnosis   • Degeneration of intervertebral disc at C5-C6 level   • Adult hypothyroidism   • RAD (reactive airway disease)   • Avitaminosis D   • Gastroesophageal reflux disease without esophagitis   • Raynaud disease   • Bilateral occipital neuralgia   • Chronic neck pain       Past Medical History:   Diagnosis Date   • Asthma     \"MILD\"   USES INHALER MAYBE TWICE A YEAR   • Atypical chest pain 04/14/2014    RESOLVED; 07/20/2015   • Cataract     maria l premature   • DDD (degenerative disc disease), cervical    • DDD (degenerative disc disease), cervical    • Degeneration of cervical intervertebral disc    • Diverticulitis    • Fatigue    • GERD (gastroesophageal reflux disease)    • Kidney cyst, acquired 10/24/2015   • Kidney stone    • Migraines    • Multiple joint pain 07/18/2013   • Non-functioning gallbladder    • Raynaud's disease    • Seasonal allergies    • Seborrheic dermatitis    • Thyroid disease        Past Surgical History:   Procedure Laterality Date   • CHOLECYSTECTOMY N/A 12/29/2017    Procedure: CHOLECYSTECTOMY LAPAROSCOPIC;  Surgeon: Mc Gonzalez MD;  Location: [facility] OR [facility];  Service:    • COLPOSCOPY      WITH BX; RESOLVED   • DILATATION AND CURETTAGE     • ENDOSCOPY N/A 12/14/2017    Procedure: ESOPHAGOGASTRODUODENOSCOPY with biopsies;  Surgeon: Ganesh Davison MD;  Location: [facility] ENDOSCOPY;  Service:    • ENDOSCOPY AND COLONOSCOPY  2010   • GALLBLADDER SURGERY     • SEPTOPLASTY, TURBINECTOMY, ENDOSCOPIC MAXILLARY ANTROSTOMY      [place]   • TONSILLECTOMY     • " URETEROSCOPY      with cystoscopy       Family History   Problem Relation Age of Onset   • Hodgkin's lymphoma Mother    • Migraines Mother    • Thyroid disease Mother    • Cancer Mother    • Hypertension Father    • Depression Father    • Atrial fibrillation Father    • Migraines Sister    • Cardiomyopathy Brother         resolved   • Lymphoma Son         Tcell   • Malig Hyperthermia Neg Hx        Social History     Socioeconomic History   • Marital status:      Spouse name: Not on file   • Number of children: 1   • Years of education: Not on file   • Highest education level: Bachelor's degree (e.g., BA, AB, BS)   Social Needs   • Financial resource strain: Patient refused   • Food insecurity - worry: Patient refused   • Food insecurity - inability: Patient refused   • Transportation needs - medical: Patient refused   • Transportation needs - non-medical: Patient refused   Occupational History   • Occupation: HOMEMAKER   Tobacco Use   • Smoking status: Former Smoker     Last attempt to quit: 2005     Years since quittin.2   • Smokeless tobacco: Never Used   Substance and Sexual Activity   • Alcohol use: Yes     Comment: SOCIAL   • Drug use: No   • Sexual activity: Defer   Other Topics Concern   • Not on file   Social History Narrative    LIVES WITH SPOUSE       -------      REVIEW OF PERTINENT MEDICAL DATA    Review of the neurosurgical office note from Dr. Shafer from 2019.  He noted neck pain and shoulder pain and also headaches since been going on for more than 2 decades.  History of physical therapy and multiple previous spine surgeon evaluation.  No history of neck surgery or interventional pain therapies.  He noted some arm weakness that seems to be kinesia phobia but not a radicular component of pain.  The neurosurgeon thought that this some patient likely does have degenerative disc disease as well as degenerative facet disease.  The neurosurgeon recommended consideration for  "medial branch blockade diagnostically with possibility of progressing to cervical medial branch radiofrequency ablations.  Also the neurosurgeon recommended at least consideration of occipital blockades.  The neurosurgical follow-up plan was to just follow up as needed and/or if there was a development of cervical radicular component.    CBC was checked on July 17, 2018 and platelet count was 232,000/mm³.    I reviewed the [facility] MRI report of cervical spine and this was from January 2, 2019.  The radiologist noted \"generally modest degenerative changes \"and also noted \"question mild cord compression at C5 6 without abnormal cord signal\"    Beck inventory is in the normal range with a score of 6.    E Yusuf report is reviewed with a request #[id number].  This is a negative study for the past year.    Review of Systems   Constitutional: Negative for chills and fever.   HENT: Negative for ear discharge and ear pain.    Eyes: Positive for photophobia (with migraine and without). Negative for pain.   Respiratory: Positive for shortness of breath (seeing PCP friday). Negative for cough and wheezing.    Cardiovascular: Positive for chest pain (past week and a half. apt friday with PCP. pt states she has been under a lot of stress.).   Gastrointestinal: Positive for nausea (with migraines). Negative for constipation and diarrhea.   Genitourinary: Negative for difficulty urinating.   Allergic/Immunologic: Negative for immunocompromised state.   Neurological: Positive for dizziness (when not feeling well), light-headedness (when not feeling well) and headaches. Negative for weakness and numbness.   Hematological: Does not bruise/bleed easily.   Psychiatric/Behavioral: Positive for sleep disturbance. Negative for agitation, confusion, hallucinations and suicidal ideas. The patient is not nervous/anxious.          Vitals:    02/13/19 1329   BP: 119/80   Pulse: 71   Resp: 15   Temp: 99.2 °F (37.3 °C)   SpO2: 99%   Weight: 64 " "kg (141 lb)   Height: 156.2 cm (61.5\")   PainSc:   2   PainLoc: Neck         Objective   Physical Exam   Constitutional: She is oriented to person, place, and time. Vital signs are normal. She appears well-developed and well-nourished. She does not have a sickly appearance.   HENT:   Head: Normocephalic and atraumatic.   Right Ear: Hearing and external ear normal.   Left Ear: Hearing and external ear normal.   Nose: Nose normal.   Mouth/Throat: Uvula is midline and oropharynx is clear and moist.   Eyes: Conjunctivae and EOM are normal. Pupils are equal, round, and reactive to light.   Neck: Neck supple. No JVD present. Muscular tenderness present. No tracheal tenderness present. No neck rigidity. No edema and no erythema present. No Brudzinski's sign and no Kernig's sign noted. No thyroid mass present.   Cardiovascular: Normal rate, regular rhythm and normal heart sounds.   Pulmonary/Chest: Effort normal and breath sounds normal.   Abdominal: Soft. Normal appearance and bowel sounds are normal. There is no hepatosplenomegaly. There is no tenderness. There is no CVA tenderness.   Musculoskeletal:        Cervical back: She exhibits decreased range of motion (There is pain on extension even past 10 degrees.  She has moderate pain when she rotates laterally past 45 degrees to each side.), bony tenderness (More so laterally at the facet level rather than a spinous processes.) and spasm. She exhibits no edema.   Lymphadenopathy:     She has no cervical adenopathy.   Neurological: She is alert and oriented to person, place, and time. She displays no tremor. No cranial nerve deficit or sensory deficit. Coordination and gait normal.   Reflex Scores:       Tricep reflexes are 2+ on the right side and 2+ on the left side.       Bicep reflexes are 2+ on the right side and 2+ on the left side.       Brachioradialis reflexes are 2+ on the right side and 2+ on the left side.       Patellar reflexes are 2+ on the right side and 2+ " "on the left side.  Spurling exam increases pain but is not classically radicular.   Skin: Skin is warm and dry.   Psychiatric: She has a normal mood and affect. Her behavior is normal. Judgment and thought content normal.   Nursing note and vitals reviewed.      Assessment/Plan   Carolin was seen today for neck pain.    Diagnoses and all orders for this visit:    Chronic pain syndrome  -     Urine Drug Screen Confirmation - Urine, Clean Catch; Future  -     POC Urine Drug Screen, Triage    Cervical spondylosis without myelopathy  -     Case Request  -     Urine Drug Screen Confirmation - Urine, Clean Catch; Future  -     POC Urine Drug Screen, Triage        --- Follow-up for procedure... Bilateral C25 CMBB, x2, 2-4 wks apart    -------  Education about Medial Branch Blockade and RF Therapy:    This medial branch blockade (MBB) suggested is intended for diagnostic purposes, with the intent of offering the patient Radiofrequency thermal rhizotomy (RF) if the MBB is diagnostically effective.  The diagnostic blockade is necessary to determine the likelihood that RF therapy could be efficacious in providing long term relief to the patient.    Medial branches are sensory nerve branches that connect to a facet joint and transmit sensations & pain signals from that joint.  Facet is a term for the type of joints found in the spine.  Medial branches are the nerves that go to a facet, and therefore are also sometimes called \"facet joint nerves\" (FJNs).      In a medial branch blockade procedure, xray fluoroscopy is used to verify the locations of the outside of the joint lines which are being targeted.  Under xray guidance, needles are placed to these areas.  Contrast dye is injected to confirm proper placement, with dye flowing over the joint area, and to ensure that the dye does not flow into unintended areas such as a vein.  When this is confirmed, local anesthetic is injected to block the medial branch at that joint level.  "     If MBBs are diagnostically successful in blocking pain, then the patient is most likely a great candidate for Radiofrequency of those facet joint nerves.  In the RF procedure, needles are placed to the joint lines in the same fashion, and after testing, the needle tips are heated to thermally treat the nerves, blocking the nerves by in essence damaging the nerves with the heat treatment.       Medically, a successful RF procedure should provide a patient with 50% pain relief or more for at least 6 months.  Clinical experience suggests that successful patients receive relief more in the range of 12 months on average.  We also discussed that a fortunate minority of patients receive therapeutic success from the MBB, and may not require RF ablation.  If a patient receives more than 8 weeks of relief from MBB, then occasional repeat MBB for therapeutic purposes is a very reasonable alternative therapy.  This course of therapy is consistent with our LCDs according to our CMS  in the area, and therefore other insurance providers should follow accordingly.  We will monitor our patients to screen for these therapeutic responders and will offer RF therapy only when necessary.        We discussed that MBB & RF are not without risks.  Guidelines regarding anticoagulant use & neuraxial procedures will be respected.  Patients that are ill or otherwise may be at risk for sepsis will not have their spines accessed by neuraxial injections of any type.  This patient will not be offered these therapies if there is an increased risk.   We discussed that there is a risk of postprocedural pain and also a risk of worsening of clinical picture with these procedures as with any neuraxial procedure.    -------             NICOLE REPORT  NICOLE report has been reviewed and scanned into the patient's chart.  Date of last NICOLE : as above.  No current use of opioids.            EMR Dragon/Transcription disclaimer:   Much of  this encounter note is an electronic transcription/translation of spoken language to printed text. The electronic translation of spoken language may permit erroneous, or at times, nonsensical words or phrases to be inadvertently transcribed; Although I have reviewed the note for such errors, some may still exist.

## 2019-02-13 NOTE — PATIENT INSTRUCTIONS
"Follow-up for procedure... Bilateral C25 CMBB, x2, 2-4 wks apart    -------  Education about Medial Branch Blockade and RF Therapy:    This medial branch blockade (MBB) suggested is intended for diagnostic purposes, with the intent of offering the patient Radiofrequency thermal rhizotomy (RF) if the MBB is diagnostically effective.  The diagnostic blockade is necessary to determine the likelihood that RF therapy could be efficacious in providing long term relief to the patient.    Medial branches are sensory nerve branches that connect to a facet joint and transmit sensations & pain signals from that joint.  Facet is a term for the type of joints found in the spine.  Medial branches are the nerves that go to a facet, and therefore are also sometimes called \"facet joint nerves\" (FJNs).      In a medial branch blockade procedure, xray fluoroscopy is used to verify the locations of the outside of the joint lines which are being targeted.  Under xray guidance, needles are placed to these areas.  Contrast dye is injected to confirm proper placement, with dye flowing over the joint area, and to ensure that the dye does not flow into unintended areas such as a vein.  When this is confirmed, local anesthetic is injected to block the medial branch at that joint level.      If MBBs are diagnostically successful in blocking pain, then the patient is most likely a great candidate for Radiofrequency of those facet joint nerves.  In the RF procedure, needles are placed to the joint lines in the same fashion, and after testing, the needle tips are heated to thermally treat the nerves, blocking the nerves by in essence damaging the nerves with the heat treatment.       Medically, a successful RF procedure should provide a patient with 50% pain relief or more for at least 6 months.  Clinical experience suggests that successful patients receive relief more in the range of 12 months on average.  We also discussed that a fortunate " minority of patients receive therapeutic success from the MBB, and may not require RF ablation.  If a patient receives more than 8 weeks of relief from MBB, then occasional repeat MBB for therapeutic purposes is a very reasonable alternative therapy.  This course of therapy is consistent with our LCDs according to our CMS  in the area, and therefore other insurance providers should follow accordingly.  We will monitor our patients to screen for these therapeutic responders and will offer RF therapy only when necessary.        We discussed that MBB & RF are not without risks.  Guidelines regarding anticoagulant use & neuraxial procedures will be respected.  Patients that are ill or otherwise may be at risk for sepsis will not have their spines accessed by neuraxial injections of any type.  This patient will not be offered these therapies if there is an increased risk.   We discussed that there is a risk of postprocedural pain and also a risk of worsening of clinical picture with these procedures as with any neuraxial procedure.    -------

## 2019-02-15 ENCOUNTER — OFFICE VISIT (OUTPATIENT)
Dept: FAMILY MEDICINE CLINIC | Facility: CLINIC | Age: 54
End: 2019-02-15

## 2019-02-15 ENCOUNTER — HOSPITAL ENCOUNTER (OUTPATIENT)
Dept: CT IMAGING | Facility: HOSPITAL | Age: 54
Discharge: HOME | End: 2019-02-15
Admitting: FAMILY MEDICINE

## 2019-02-15 VITALS
BODY MASS INDEX: 25.76 KG/M2 | SYSTOLIC BLOOD PRESSURE: 116 MMHG | WEIGHT: 140 LBS | OXYGEN SATURATION: 99 % | HEIGHT: 62 IN | DIASTOLIC BLOOD PRESSURE: 78 MMHG | HEART RATE: 63 BPM | RESPIRATION RATE: 16 BRPM

## 2019-02-15 DIAGNOSIS — R07.2 PRECORDIAL PAIN: ICD-10-CM

## 2019-02-15 DIAGNOSIS — R06.02 SOB (SHORTNESS OF BREATH): ICD-10-CM

## 2019-02-15 DIAGNOSIS — R06.02 SOB (SHORTNESS OF BREATH): Primary | ICD-10-CM

## 2019-02-15 DIAGNOSIS — R07.2 PRECORDIAL PAIN: Primary | ICD-10-CM

## 2019-02-15 LAB — CREAT BLDA-MCNC: 0.7 MG/DL (ref 0.6–1.3)

## 2019-02-15 PROCEDURE — 0 IOPAMIDOL PER 1 ML: Performed by: FAMILY MEDICINE

## 2019-02-15 PROCEDURE — 71275 CT ANGIOGRAPHY CHEST: CPT

## 2019-02-15 PROCEDURE — 99214 OFFICE O/P EST MOD 30 MIN: CPT | Performed by: FAMILY MEDICINE

## 2019-02-15 PROCEDURE — 82565 ASSAY OF CREATININE: CPT

## 2019-02-15 PROCEDURE — 93000 ELECTROCARDIOGRAM COMPLETE: CPT | Performed by: FAMILY MEDICINE

## 2019-02-15 RX ADMIN — IOPAMIDOL 95 ML: 755 INJECTION, SOLUTION INTRAVENOUS at 12:01

## 2019-02-15 NOTE — PROGRESS NOTES
Problem List Items Addressed This Visit     None      Visit Diagnoses     SOB (shortness of breath)    -  Primary    Relevant Orders    CT angiogram chest w contrast    Precordial pain        Relevant Orders    ECG 12 Lead    CT angiogram chest w contrast         Return for Dependent on test results.    Carolin Zapien is a 53 y.o. female being seen in our office today for Pain (under right arm and breast area x4 months); Chest Pain (x2 weeks); and Shortness of Breath (x2 weeks)                 She  reports that she quit smoking about 13 years ago. she has never used smokeless tobacco. She reports that she drinks alcohol. She reports that she does not use drugs.             HPI For about four months she has had a muscular kind of pain under her right breast and into the armpit. Nothing that she feels and she is a little concerned just because of where it is. Certain movements like push up on something or try to pull up on something. She was having some tenderness in the breast that is better though she still notices it if she leans up against that breast somewhere (on the right). No lump that she can ascertain, no redness and she had a normal mammogram in July. Mammogram was with Dr. Arrington.    Also having some shortness of breath that began one week ago, feeling pressure in the center of the chest. Was breathless on the hills in [facility] which is new to her. Comes and goes, Used inhaler and that seemed to help a little but it would wear off in 45 minutes. No nausea, radiation, sweating. Is not necessarily occurring with activity. Sometimes when she is sitting she feels it. Does take HRT. Feels worse with taking a deep breath when she feels it.              The following portions of the patient's history were reviewed and updated as appropriate:PMHroutine: Social history , Allergies, Current Medications, Active Problem List and Health Maintenance            Review of Systems   Constitutional: Negative for  activity change, appetite change, chills, fatigue, fever and unexpected weight change.   HENT: Negative for congestion, ear pain, hearing loss, nosebleeds, rhinorrhea and sore throat.    Eyes: Negative for pain, redness and visual disturbance.   Respiratory: Positive for shortness of breath. Negative for cough and wheezing.    Cardiovascular: Positive for chest pain. Negative for palpitations and leg swelling.   Gastrointestinal: Negative for abdominal pain, blood in stool, constipation, diarrhea, nausea and vomiting.   Endocrine: Negative for cold intolerance and heat intolerance.   Genitourinary: Negative for difficulty urinating, dysuria, frequency, hematuria, pelvic pain, urgency and vaginal discharge.   Musculoskeletal: Negative for arthralgias, back pain and joint swelling.   Skin: Negative for rash and wound.   Neurological: Negative for dizziness, weakness, numbness and headaches.   Hematological: Does not bruise/bleed easily.   Psychiatric/Behavioral: Negative for dysphoric mood, sleep disturbance and suicidal ideas. The patient is not nervous/anxious.                 BP Readings from Last 1 Encounters:   02/15/19 116/78     Wt Readings from Last 3 Encounters:   02/15/19 63.5 kg (140 lb)   02/13/19 64 kg (141 lb)   07/20/18 63.5 kg (140 lb)   Body mass index is 26.02 kg/m².             Physical Exam   Constitutional: She is oriented to person, place, and time. Vital signs are normal. She appears well-developed and well-nourished. No distress.   HENT:   Head: Normocephalic.   Cardiovascular: Normal rate, regular rhythm and normal heart sounds.   Pulmonary/Chest: Effort normal and breath sounds normal.   Right breast w/o mass, axillary exam neg   Neurological: She is alert and oriented to person, place, and time. Gait normal.   Psychiatric: She has a normal mood and affect. Her behavior is normal. Judgment and thought content normal.   Vitals reviewed.          ECG 12 Lead  Date/Time: 2/15/2019 10:58  AM  Performed by: Shazia Balderas MD  Authorized by: Shazia Balderas MD   Comparison: not compared with previous ECG   Previous ECG: no previous ECG available  Rhythm: sinus rhythm and sinus bradycardia  Rate: bradycardic  Conduction: conduction normal  ST Segments: ST segments normal  T Waves: T waves normal  QRS axis: normal  Other: no other findings    Clinical impression: normal ECG

## 2019-02-15 NOTE — NURSING NOTE
Call from Dr Dickerson who read CT scan PE protocol, is negative for PE.  Call to Dr Mcfarland to report results, ordered pt dc'd home.  Pt dc'd ambulatory in good condition

## 2019-02-15 NOTE — NURSING NOTE
"Pt arrived to [place] [facility] to wait for reading for a \"Stat hold and call\" CTA Chest PE Protocol. Pt oriented to unit and no needs at this time.  "

## 2019-02-18 ENCOUNTER — RESULTS ENCOUNTER (OUTPATIENT)
Dept: PAIN MEDICINE | Facility: CLINIC | Age: 54
End: 2019-02-18

## 2019-02-18 DIAGNOSIS — M47.812 CERVICAL SPONDYLOSIS WITHOUT MYELOPATHY: ICD-10-CM

## 2019-02-18 DIAGNOSIS — G89.4 CHRONIC PAIN SYNDROME: ICD-10-CM

## 2019-03-01 ENCOUNTER — HOSPITAL ENCOUNTER (OUTPATIENT)
Dept: CARDIOLOGY | Facility: HOSPITAL | Age: 54
Discharge: HOME | End: 2019-03-01

## 2019-03-01 ENCOUNTER — HOSPITAL ENCOUNTER (OUTPATIENT)
Dept: CARDIOLOGY | Facility: HOSPITAL | Age: 54
Discharge: HOME | End: 2019-03-01
Admitting: FAMILY MEDICINE

## 2019-03-01 VITALS
DIASTOLIC BLOOD PRESSURE: 80 MMHG | BODY MASS INDEX: 25.76 KG/M2 | SYSTOLIC BLOOD PRESSURE: 120 MMHG | WEIGHT: 140 LBS | HEIGHT: 62 IN | HEART RATE: 65 BPM

## 2019-03-01 DIAGNOSIS — R07.2 PRECORDIAL PAIN: ICD-10-CM

## 2019-03-01 DIAGNOSIS — R06.02 SOB (SHORTNESS OF BREATH): ICD-10-CM

## 2019-03-01 LAB
ASCENDING AORTA: 3 CM
BH CV ECHO MEAS - ACS: 1.6 CM
BH CV ECHO MEAS - AO MAX PG (FULL): 4.3 MMHG
BH CV ECHO MEAS - AO MAX PG: 7.7 MMHG
BH CV ECHO MEAS - AO MEAN PG (FULL): 2.1 MMHG
BH CV ECHO MEAS - AO MEAN PG: 4.4 MMHG
BH CV ECHO MEAS - AO ROOT AREA (BSA CORRECTED): 1.9
BH CV ECHO MEAS - AO ROOT AREA: 7.4 CM^2
BH CV ECHO MEAS - AO ROOT DIAM: 3.1 CM
BH CV ECHO MEAS - AO V2 MAX: 138.8 CM/SEC
BH CV ECHO MEAS - AO V2 MEAN: 100.5 CM/SEC
BH CV ECHO MEAS - AO V2 VTI: 32.9 CM
BH CV ECHO MEAS - AVA(I,A): 1.3 CM^2
BH CV ECHO MEAS - AVA(I,D): 1.3 CM^2
BH CV ECHO MEAS - AVA(V,A): 1.3 CM^2
BH CV ECHO MEAS - AVA(V,D): 1.3 CM^2
BH CV ECHO MEAS - BSA(HAYCOCK): 1.7 M^2
BH CV ECHO MEAS - BSA: 1.6 M^2
BH CV ECHO MEAS - BZI_BMI: 25.6 KILOGRAMS/M^2
BH CV ECHO MEAS - BZI_METRIC_HEIGHT: 157.5 CM
BH CV ECHO MEAS - BZI_METRIC_WEIGHT: 63.5 KG
BH CV ECHO MEAS - EDV(MOD-SP2): 80 ML
BH CV ECHO MEAS - EDV(MOD-SP4): 75 ML
BH CV ECHO MEAS - EDV(TEICH): 69.7 ML
BH CV ECHO MEAS - EF(CUBED): 63.2 %
BH CV ECHO MEAS - EF(MOD-BP): 64 %
BH CV ECHO MEAS - EF(MOD-SP2): 63.8 %
BH CV ECHO MEAS - EF(MOD-SP4): 64 %
BH CV ECHO MEAS - EF(TEICH): 55.3 %
BH CV ECHO MEAS - ESV(MOD-SP2): 29 ML
BH CV ECHO MEAS - ESV(MOD-SP4): 27 ML
BH CV ECHO MEAS - ESV(TEICH): 31.2 ML
BH CV ECHO MEAS - FS: 28.3 %
BH CV ECHO MEAS - IVS/LVPW: 1
BH CV ECHO MEAS - IVSD: 0.96 CM
BH CV ECHO MEAS - LAT PEAK E' VEL: 11 CM/SEC
BH CV ECHO MEAS - LV DIASTOLIC VOL/BSA (35-75): 45.7 ML/M^2
BH CV ECHO MEAS - LV MASS(C)D: 117.6 GRAMS
BH CV ECHO MEAS - LV MASS(C)DI: 71.6 GRAMS/M^2
BH CV ECHO MEAS - LV MAX PG: 3.4 MMHG
BH CV ECHO MEAS - LV MEAN PG: 2.3 MMHG
BH CV ECHO MEAS - LV SYSTOLIC VOL/BSA (12-30): 16.4 ML/M^2
BH CV ECHO MEAS - LV V1 MAX: 91.7 CM/SEC
BH CV ECHO MEAS - LV V1 MEAN: 74.4 CM/SEC
BH CV ECHO MEAS - LV V1 VTI: 22 CM
BH CV ECHO MEAS - LVIDD: 4 CM
BH CV ECHO MEAS - LVIDS: 2.9 CM
BH CV ECHO MEAS - LVLD AP2: 7.6 CM
BH CV ECHO MEAS - LVLD AP4: 7 CM
BH CV ECHO MEAS - LVLS AP2: 6.1 CM
BH CV ECHO MEAS - LVLS AP4: 5.6 CM
BH CV ECHO MEAS - LVOT AREA (M): 2 CM^2
BH CV ECHO MEAS - LVOT AREA: 2 CM^2
BH CV ECHO MEAS - LVOT DIAM: 1.6 CM
BH CV ECHO MEAS - LVPWD: 0.93 CM
BH CV ECHO MEAS - MED PEAK E' VEL: 9 CM/SEC
BH CV ECHO MEAS - MV A DUR: 0.11 SEC
BH CV ECHO MEAS - MV A MAX VEL: 66.9 CM/SEC
BH CV ECHO MEAS - MV DEC SLOPE: 482.2 CM/SEC^2
BH CV ECHO MEAS - MV DEC TIME: 0.2 SEC
BH CV ECHO MEAS - MV E MAX VEL: 97 CM/SEC
BH CV ECHO MEAS - MV E/A: 1.4
BH CV ECHO MEAS - MV MAX PG: 5.6 MMHG
BH CV ECHO MEAS - MV MEAN PG: 1.3 MMHG
BH CV ECHO MEAS - MV P1/2T MAX VEL: 96 CM/SEC
BH CV ECHO MEAS - MV P1/2T: 58.3 MSEC
BH CV ECHO MEAS - MV V2 MAX: 118.7 CM/SEC
BH CV ECHO MEAS - MV V2 MEAN: 49.3 CM/SEC
BH CV ECHO MEAS - MV V2 VTI: 33.5 CM
BH CV ECHO MEAS - MVA P1/2T LCG: 2.3 CM^2
BH CV ECHO MEAS - MVA(P1/2T): 3.8 CM^2
BH CV ECHO MEAS - MVA(VTI): 1.3 CM^2
BH CV ECHO MEAS - PA ACC TIME: 0.11 SEC
BH CV ECHO MEAS - PA MAX PG (FULL): 1.2 MMHG
BH CV ECHO MEAS - PA MAX PG: 2.7 MMHG
BH CV ECHO MEAS - PA PR(ACCEL): 31.5 MMHG
BH CV ECHO MEAS - PA V2 MAX: 82.1 CM/SEC
BH CV ECHO MEAS - PULM A REVS DUR: 0.07 SEC
BH CV ECHO MEAS - PULM A REVS VEL: 32 CM/SEC
BH CV ECHO MEAS - PULM DIAS VEL: 46.6 CM/SEC
BH CV ECHO MEAS - PULM S/D: 1.4
BH CV ECHO MEAS - PULM SYS VEL: 65.5 CM/SEC
BH CV ECHO MEAS - PVA(V,A): 1.6 CM^2
BH CV ECHO MEAS - PVA(V,D): 1.6 CM^2
BH CV ECHO MEAS - QP/QS: 0.63
BH CV ECHO MEAS - RAP SYSTOLE: 8 MMHG
BH CV ECHO MEAS - RV MAX PG: 1.5 MMHG
BH CV ECHO MEAS - RV MEAN PG: 0.98 MMHG
BH CV ECHO MEAS - RV V1 MAX: 61.1 CM/SEC
BH CV ECHO MEAS - RV V1 MEAN: 47.8 CM/SEC
BH CV ECHO MEAS - RV V1 VTI: 12.8 CM
BH CV ECHO MEAS - RVOT AREA: 2.2 CM^2
BH CV ECHO MEAS - RVOT DIAM: 1.7 CM
BH CV ECHO MEAS - RVSP: 27 MMHG
BH CV ECHO MEAS - SI(AO): 148.8 ML/M^2
BH CV ECHO MEAS - SI(CUBED): 24.5 ML/M^2
BH CV ECHO MEAS - SI(LVOT): 26.8 ML/M^2
BH CV ECHO MEAS - SI(MOD-SP2): 31 ML/M^2
BH CV ECHO MEAS - SI(MOD-SP4): 29.2 ML/M^2
BH CV ECHO MEAS - SI(TEICH): 23.5 ML/M^2
BH CV ECHO MEAS - SUP REN AO DIAM: 1.7 CM
BH CV ECHO MEAS - SV(AO): 244.4 ML
BH CV ECHO MEAS - SV(CUBED): 40.2 ML
BH CV ECHO MEAS - SV(LVOT): 44 ML
BH CV ECHO MEAS - SV(MOD-SP2): 51 ML
BH CV ECHO MEAS - SV(MOD-SP4): 48 ML
BH CV ECHO MEAS - SV(RVOT): 27.6 ML
BH CV ECHO MEAS - SV(TEICH): 38.5 ML
BH CV ECHO MEAS - TAPSE (>1.6): 2 CM2
BH CV ECHO MEAS - TR MAX VEL: 247 CM/SEC
BH CV ECHO MEASUREMENTS AVERAGE E/E' RATIO: 9.7
BH CV STRESS BP STAGE 1: NORMAL
BH CV STRESS BP STAGE 2: NORMAL
BH CV STRESS BP STAGE 3: NORMAL
BH CV STRESS DURATION MIN STAGE 1: 3
BH CV STRESS DURATION MIN STAGE 2: 3
BH CV STRESS DURATION MIN STAGE 3: 3
BH CV STRESS DURATION MIN STAGE 4: 0
BH CV STRESS DURATION SEC STAGE 1: 0
BH CV STRESS DURATION SEC STAGE 2: 0
BH CV STRESS DURATION SEC STAGE 3: 0
BH CV STRESS DURATION SEC STAGE 4: 30
BH CV STRESS GRADE STAGE 1: 10
BH CV STRESS GRADE STAGE 2: 12
BH CV STRESS GRADE STAGE 3: 14
BH CV STRESS GRADE STAGE 4: 16
BH CV STRESS HR STAGE 1: 110
BH CV STRESS HR STAGE 2: 135
BH CV STRESS HR STAGE 3: 154
BH CV STRESS HR STAGE 4: 160
BH CV STRESS METS STAGE 1: 5
BH CV STRESS METS STAGE 2: 7.5
BH CV STRESS METS STAGE 3: 10
BH CV STRESS METS STAGE 4: 13.5
BH CV STRESS PROTOCOL 1: NORMAL
BH CV STRESS RECOVERY BP: NORMAL MMHG
BH CV STRESS RECOVERY HR: 92 BPM
BH CV STRESS SPEED STAGE 1: 1.7
BH CV STRESS SPEED STAGE 2: 2.5
BH CV STRESS SPEED STAGE 3: 3.4
BH CV STRESS SPEED STAGE 4: 4.2
BH CV STRESS STAGE 1: 1
BH CV STRESS STAGE 2: 2
BH CV STRESS STAGE 3: 3
BH CV STRESS STAGE 4: 4
BH CV XLRA - RV BASE: 2.6 CM
BH CV XLRA - TDI S': 13 CM/SEC
LEFT ATRIUM VOLUME INDEX: 25 ML/M2
LV EF 2D ECHO EST: 64 %
MAXIMAL PREDICTED HEART RATE: 168 BPM
PERCENT MAX PREDICTED HR: 95.24 %
SINUS: 3 CM
STJ: 2.6 CM
STRESS BASELINE BP: NORMAL MMHG
STRESS BASELINE HR: 63 BPM
STRESS PERCENT HR: 112 %
STRESS POST ESTIMATED WORKLOAD: 10 METS
STRESS POST EXERCISE DUR MIN: 9 MIN
STRESS POST EXERCISE DUR SEC: 30 SEC
STRESS POST PEAK BP: NORMAL MMHG
STRESS POST PEAK HR: 160 BPM
STRESS TARGET HR: 143 BPM

## 2019-03-01 PROCEDURE — 93018 CV STRESS TEST I&R ONLY: CPT | Performed by: INTERNAL MEDICINE

## 2019-03-01 PROCEDURE — 93016 CV STRESS TEST SUPVJ ONLY: CPT | Performed by: INTERNAL MEDICINE

## 2019-03-01 PROCEDURE — 93306 TTE W/DOPPLER COMPLETE: CPT | Performed by: INTERNAL MEDICINE

## 2019-03-01 PROCEDURE — 93017 CV STRESS TEST TRACING ONLY: CPT

## 2019-03-01 PROCEDURE — 93306 TTE W/DOPPLER COMPLETE: CPT

## 2019-03-07 ENCOUNTER — OUTSIDE FACILITY SERVICE (OUTPATIENT)
Dept: PAIN MEDICINE | Facility: CLINIC | Age: 54
End: 2019-03-07

## 2019-03-07 ENCOUNTER — DOCUMENTATION (OUTPATIENT)
Dept: PAIN MEDICINE | Facility: CLINIC | Age: 54
End: 2019-03-07

## 2019-03-07 PROCEDURE — 64492 INJ PARAVERT F JNT C/T 3 LEV: CPT | Performed by: ANESTHESIOLOGY

## 2019-03-07 PROCEDURE — 64490 INJ PARAVERT F JNT C/T 1 LEV: CPT | Performed by: ANESTHESIOLOGY

## 2019-03-07 PROCEDURE — 64491 INJ PARAVERT F JNT C/T 2 LEV: CPT | Performed by: ANESTHESIOLOGY

## 2019-03-07 NOTE — PROGRESS NOTES
Bilateral C2-5 Cervical Medial Branch Blockade  [facility]      PREOPERATIVE DIAGNOSIS:  Cervical spondylosis without myelopathy   POSTOPERATIVE DIAGNOSIS:  Same as preoperative diagnosis    PROCEDURE:    Cervical Facet Nerve (medial branch) Blocks, with Fluoroscopy:  LEVELS C2, C3, C4, and C5, to block facet joints C2-3, C3-4, and C4-5 bilaterally  • 58954-71  - Bilateral Cervical Facet blocks, 1st level  • [id number]  - Bilateral Cervical Facet blocks, 2nd level  • 72291-30  - Bilateral Cervical Facet blocks, 3rd level    PRE-PROCEDURE DISCUSSION WITH PATIENT:    Risks and complications were discussed with the patient prior to starting the procedure and informed consent was obtained.    SURGEON:  Ankit Kolb MD    REASON FOR PROCEDURE:    The patient complains of pain that seems to have a significant axial component Tenderness of the affected facet joints on exam under fluoroscopy    SEDATION:  Versed 6mg IV  ANESTHETIC:   Marcaine 0.5%  STEROID:  Dexamethasone 4mg  TOTAL VOLUME OF SOLUTION:  8mL    DESCRIPTON OF PROCEDURE:  After obtaining informed consent, the patient was placed in the prone position. IV access was obtained.  EKG, blood pressure, and pulse oximeter were monitored and all sedation was administered by an RN under my direct guidance.  The cervical area was prepped with Chloraprep and draped with sterile barrier. Under fluoroscopic guidance the waists of the C2 through C5 vertebra on each side were identified. Skin and subcutaneous tissue was then anesthetized with 1% lidocaine 1mL at each point. Then spinal needles were introduced under fluoroscopic guidance at the waist of these vertebra on one side. After confirming the position of the needle under fluoroscopic PA and lateral views, and confirming negative aspiration of blood and CSF, 0.25 mL of Omnipaque was injected.  Proper spread and lack of vascular uptake was demonstrated.  A solution was prepared as above, and 1 mL  of that solution was injected very slowly each level.   In similar fashion, this procedure was repeated at C2, C3, C4, and C5 levels on the contralateral side.  Needles were removed intact from all levels.  Vitals were stable throughout.     ESTIMATED BLOOD LOSS:  minimal  SPECIMENS:  None    COMPLICATIONS:    No complications were noted., There was no indication of vascular uptake on live injection of contrast dye. and The patient did not have any signs of postprocedure numbness nor weakness.    TOLERANCE & DISCHARGE CONDITION:    The patient tolerated the procedure well.  The patient was transported to the recovery area without difficulties.  The patient was discharged to home under the care of family in stable and satisfactory condition.  The patient did notice improvement in pain on extension and rotation of the cervical spine.    PLAN OF CARE:  1. The patient was given our standard instruction sheet.  2. We discussed that Cervical Medial Branch Blockade is a diagnostic procedure in consideration for radiofrequency ablation if two diagnostic procedures proved to be positive for significant benefit.  If sustained relief of six to eight weeks is obtained, then an alternative plan could be therapeutic cervical medial branch blocks on an as-needed basis.  3. The patient is asked to keep a pain log hourly for 8 hours postoperatively today.  4. The patient will Repeat injection 2-4 wks.  5. The patient will resume all medications as per the medication reconciliation sheet.

## 2019-03-12 DIAGNOSIS — R06.02 SHORTNESS OF BREATH: Primary | ICD-10-CM

## 2019-03-28 DIAGNOSIS — R06.02 SHORTNESS OF BREATH: Primary | ICD-10-CM

## 2019-04-02 ENCOUNTER — OUTSIDE FACILITY SERVICE (OUTPATIENT)
Dept: PAIN MEDICINE | Facility: CLINIC | Age: 54
End: 2019-04-02

## 2019-04-02 ENCOUNTER — DOCUMENTATION (OUTPATIENT)
Dept: PAIN MEDICINE | Facility: CLINIC | Age: 54
End: 2019-04-02

## 2019-04-02 PROCEDURE — 64492 INJ PARAVERT F JNT C/T 3 LEV: CPT | Performed by: ANESTHESIOLOGY

## 2019-04-02 PROCEDURE — 64490 INJ PARAVERT F JNT C/T 1 LEV: CPT | Performed by: ANESTHESIOLOGY

## 2019-04-02 PROCEDURE — 64491 INJ PARAVERT F JNT C/T 2 LEV: CPT | Performed by: ANESTHESIOLOGY

## 2019-04-03 ENCOUNTER — CLINICAL SUPPORT (OUTPATIENT)
Dept: FAMILY MEDICINE CLINIC | Facility: CLINIC | Age: 54
End: 2019-04-03

## 2019-04-03 DIAGNOSIS — Z23 NEED FOR VACCINATION: Primary | ICD-10-CM

## 2019-04-03 PROCEDURE — 90471 IMMUNIZATION ADMIN: CPT | Performed by: FAMILY MEDICINE

## 2019-04-03 PROCEDURE — 90715 TDAP VACCINE 7 YRS/> IM: CPT | Performed by: FAMILY MEDICINE

## 2019-04-03 NOTE — PROGRESS NOTES
Bilateral C2-5 Cervical Medial Branch Blockade  [facility]      PREOPERATIVE DIAGNOSIS:  Cervical spondylosis without myelopathy   POSTOPERATIVE DIAGNOSIS:  Same as preoperative diagnosis    PROCEDURE:    Cervical Facet Nerve (medial branch) Blocks, with Fluoroscopy:  LEVELS C2, C3, C4, and C5, to block facet joints C2-3, C3-4, and C4-5 bilaterally  • [id number]  - Bilateral Cervical Facet blocks, 1st level  • [id number]  - Bilateral Cervical Facet blocks, 2nd level  • [id number]  - Bilateral Cervical Facet blocks, 3rd level    PRE-PROCEDURE DISCUSSION WITH PATIENT:    Risks and complications were discussed with the patient prior to starting the procedure and informed consent was obtained.    SURGEON:  Ankit Kolb MD    REASON FOR PROCEDURE:    Previous diagnostic positivity of a Cervical Medial Branch Blockade at the same levels    SEDATION:  Versed 4mg IV  ANESTHETIC:   Marcaine 0.5%  STEROID:  NONE  TOTAL VOLUME OF SOLUTION:  8mL    DESCRIPTON OF PROCEDURE:  After obtaining informed consent, the patient was placed in the prone position. IV access was obtained.  EKG, blood pressure, and pulse oximeter were monitored and all sedation was administered by an RN under my direct guidance.  The cervical area was prepped with Chloraprep and draped with sterile barrier. Under fluoroscopic guidance the waists of the C2 through C5 vertebra on each side were identified. Skin and subcutaneous tissue was then anesthetized with 1% lidocaine 1mL at each point. Then spinal needles were introduced under fluoroscopic guidance at the waist of these vertebra on one side. After confirming the position of the needle under fluoroscopic PA and lateral views, and confirming negative aspiration of blood and CSF, 0.25 mL of Omnipaque was injected.  Proper spread and lack of vascular uptake was demonstrated.  A solution was prepared as above, and 1 mL of that solution was injected very slowly each level.   In similar  fashion, this procedure was repeated at C2, C3, C4, and C5 levels on the contralateral side.  Needles were removed intact from all levels.  Vitals were stable throughout.     ESTIMATED BLOOD LOSS:  minimal  SPECIMENS:  None    COMPLICATIONS:    No complications were noted., There was no indication of vascular uptake on live injection of contrast dye. and The patient did not have any signs of postprocedure numbness nor weakness.    TOLERANCE & DISCHARGE CONDITION:    The patient tolerated the procedure well.  The patient was transported to the recovery area without difficulties.  The patient was discharged to home under the care of family in stable and satisfactory condition.  The patient did notice improvement in pain on extension and rotation of the cervical spine.    PLAN OF CARE:  1. The patient was given our standard instruction sheet.  2. We discussed that Cervical Medial Branch Blockade is a diagnostic procedure in consideration for radiofrequency ablation if two diagnostic procedures proved to be positive for significant benefit.  If sustained relief of six to eight weeks is obtained, then an alternative plan could be therapeutic cervical medial branch blocks on an as-needed basis.  3. The patient is asked to keep a pain log hourly for 8 hours postoperatively today.  4. The patient will Return to clinic 2-3 wks.  5. The patient will resume all medications as per the medication reconciliation sheet.

## 2019-04-30 ENCOUNTER — OFFICE VISIT (OUTPATIENT)
Dept: PAIN MEDICINE | Facility: CLINIC | Age: 54
End: 2019-04-30

## 2019-04-30 VITALS
BODY MASS INDEX: 26.68 KG/M2 | TEMPERATURE: 98 F | HEART RATE: 66 BPM | WEIGHT: 145 LBS | OXYGEN SATURATION: 96 % | HEIGHT: 62 IN | DIASTOLIC BLOOD PRESSURE: 86 MMHG | SYSTOLIC BLOOD PRESSURE: 125 MMHG | RESPIRATION RATE: 16 BRPM

## 2019-04-30 DIAGNOSIS — M50.322 DEGENERATION OF INTERVERTEBRAL DISC AT C5-C6 LEVEL: ICD-10-CM

## 2019-04-30 DIAGNOSIS — G89.29 CHRONIC NECK PAIN: Primary | ICD-10-CM

## 2019-04-30 DIAGNOSIS — M54.2 CHRONIC NECK PAIN: Primary | ICD-10-CM

## 2019-04-30 DIAGNOSIS — M54.81 BILATERAL OCCIPITAL NEURALGIA: ICD-10-CM

## 2019-04-30 DIAGNOSIS — M47.812 CERVICAL FACET JOINT SYNDROME: ICD-10-CM

## 2019-04-30 PROCEDURE — 99214 OFFICE O/P EST MOD 30 MIN: CPT | Performed by: NURSE PRACTITIONER

## 2019-04-30 NOTE — PROGRESS NOTES
CHIEF COMPLAINT  Pt is here to f/u on injections   Initial evaluation by Grace JOSHI-- DB PATIENT  Subjective   Carolin Zapien is a 53 y.o. female  who presents to the office for follow-up of procedure.  She completed a bilateral C2-C5 MBB   on  4-2-19 and 3-7-19 performed by Dr. FORD for management of NECK PAIN. Patient reports SIGNIFICANT relief from the procedure.   With the procedure on 3-7-19, she reports she had approximately 90% relief from the procedure for 3 days and then 50% relief for 10 days. She noted increased activity and function. Was able to clean house without any pain.  With procedure on 4-2-19, she had 80-90% relief from the pain for that day. She had 70% relief for 3 days. After day 4, her pain started to return to pre-procedure level.     Complains of pain in her neck and head. Today her pain is 2/10VAS. Describes the pain as continuous aching with intermittent sharp.  Pain increases with prolonged positioning and sitting; pain decreases with procedures. ADL's by self. HAs failed PT.    Neck Pain    This is a chronic problem. The current episode started more than 1 year ago. The problem occurs constantly. The problem has been gradually worsening (temporary relief with cervical MBB). The pain is present in the midline. The pain is at a severity of 2/10. The pain is moderate. The symptoms are aggravated by bending, position and twisting. The pain is worse during the day. Associated symptoms include headaches (Since back injection). Pertinent negatives include no fever, numbness or pain with swallowing.      PEG Assessment   What number best describes your pain on average in the past week?5  What number best describes how, during the past week, pain has interfered with your enjoyment of life?5  What number best describes how, during the past week, pain has interfered with your general activity?  3    The following portions of the patient's history were reviewed and updated as  "appropriate: allergies, current medications, past family history, past medical history, past social history, past surgical history and problem list.    Review of Systems   Constitutional: Negative for fever.   HENT: Positive for congestion.    Respiratory: Negative for shortness of breath.    Gastrointestinal: Negative for abdominal pain.   Genitourinary: Negative for difficulty urinating.   Musculoskeletal: Positive for neck pain (Pt sts all the time).   Neurological: Positive for headaches (Since back injection). Negative for numbness.   Psychiatric/Behavioral: Negative for sleep disturbance.       Vitals:    04/30/19 1139   BP: 125/86   Pulse: 66   Resp: 16   Temp: 98 °F (36.7 °C)   SpO2: 96%   Weight: 65.8 kg (145 lb)   Height: 157.5 cm (62.01\")   PainSc:   2   PainLoc: Back     Objective   Physical Exam   Constitutional: She is oriented to person, place, and time. Vital signs are normal. She appears well-developed and well-nourished. She is cooperative.   HENT:   Head: Normocephalic and atraumatic.   Nose: Nose normal.   Eyes: Conjunctivae and lids are normal.   Neck: Trachea normal. Neck supple. Spinous process tenderness (moderate tenderness of bilateral C2-C5 facets; + loading manuever) and muscular tenderness (no distinct trigger point) present. Decreased range of motion present.   Cardiovascular: Normal rate.   Pulmonary/Chest: Effort normal.   Abdominal: Normal appearance.   Neurological: She is alert and oriented to person, place, and time. Gait normal.   Reflex Scores:       Bicep reflexes are 2+ on the right side and 2+ on the left side.       Brachioradialis reflexes are 2+ on the right side and 2+ on the left side.  Skin: Skin is warm, dry and intact.   Psychiatric: She has a normal mood and affect. Her speech is normal and behavior is normal. Judgment and thought content normal. Cognition and memory are normal.   Nursing note and vitals reviewed.      Assessment/Plan   Carolin was seen today for back " pain.    Diagnoses and all orders for this visit:    Chronic neck pain  -     Case Request    Degeneration of intervertebral disc at C5-C6 level    Bilateral occipital neuralgia    Cervical facet joint syndrome  -     Case Request      --- bilateral C2-C5 RFL. No blood thinners. Reviewed the procedure at length with the patient.  Included in the review was expectations, complications, risk and benefits.The procedure was described in detail and the risks, benefits and alternatives were discussed with the patient (including but not limited to: bleeding, infection, nerve damage, worsening of pain, inability to perform injection, paralysis, seizures, and death) who agreed to proceed.  Discussed the potential for sedation if warranted/wanted.  The procedure will plan to be performed at [facility] with fluoroscopic guidance(unless ultrasound is indicated). Questions were answered and in a way the patient could understand.  Patient verbalized understanding and wishes to proceed.  This intervention will be ordered.  --- Follow-up after procedure or sooner if needed     NICOLE REPORT  NICOLE report has been reviewed and scanned into the patient's chart.    As the clinician, I personally reviewed the NICOLE from 4-29-19 while the patient was in the office today.    -------  Education about Medial Branch Blockade and RF Therapy:    This medial branch blockade (MBB) suggested is intended for diagnostic purposes, with the intent of offering the patient Radiofrequency thermal rhizotomy (RF) if the MBB is diagnostically effective.  The diagnostic blockade is necessary to determine the likelihood that RF therapy could be efficacious in providing long term relief to the patient.    Medial branches are sensory nerve branches that connect to a facet joint and transmit sensations & pain signals from that joint.  Facet is a term for the type of joints found in the spine.  Medial branches are the nerves that go to a  "facet, and therefore are also sometimes called \"facet joint nerves\" (FJNs).      In a medial branch blockade procedure, xray fluoroscopy is used to verify the locations of the outside of the joint lines which are being targeted.  Under xray guidance, needles are placed to these areas.  Contrast dye is injected to confirm proper placement, with dye flowing over the joint area, and to ensure that the dye does not flow into unintended areas such as a vein.  When this is confirmed, local anesthetic is injected to block the medial branch at that joint level.      If MBBs are diagnostically successful in blocking pain, then the patient is most likely a great candidate for Radiofrequency of those facet joint nerves.  In the RF procedure, needles are placed to the joint lines in the same fashion, and after testing, the needle tips are heated to thermally treat the nerves, blocking the nerves by in essence damaging the nerves with the heat treatment.       Medically, a successful RF procedure should provide a patient with 50% pain relief or more for at least 6 months.  Clinical experience suggests that successful patients receive relief more in the range of 12 months on average.  We also discussed that a fortunate minority of patients receive therapeutic success from the MBB, and may not require RF ablation.  If a patient receives more than 8 weeks of relief from MBB, then occasional repeat MBB for therapeutic purposes is a very reasonable alternative therapy.  This course of therapy is consistent with our LCDs according to our CMS  in the area, and therefore other insurance providers should follow accordingly.  We will monitor our patients to screen for these therapeutic responders and will offer RF therapy only when necessary.        We discussed that MBB & RF are not without risks.  Guidelines regarding anticoagulant use & neuraxial procedures will be respected.  Patients that are ill or otherwise may be at " risk for sepsis will not have their spines accessed by neuraxial injections of any type.  This patient will not be offered these therapies if there is an increased risk.   We discussed that there is a risk of postprocedural pain and also a risk of worsening of clinical picture with these procedures as with any neuraxial procedure.    -------    EMR Dragon/Transcription disclaimer:   Much of this encounter note is an electronic transcription/translation of spoken language to printed text. The electronic translation of spoken language may permit erroneous, or at times, nonsensical words or phrases to be inadvertently transcribed; Although I have reviewed the note for such errors, some may still exist.

## 2019-05-30 ENCOUNTER — DOCUMENTATION (OUTPATIENT)
Dept: PAIN MEDICINE | Facility: CLINIC | Age: 54
End: 2019-05-30

## 2019-05-30 ENCOUNTER — OUTSIDE FACILITY SERVICE (OUTPATIENT)
Dept: PAIN MEDICINE | Facility: CLINIC | Age: 54
End: 2019-05-30

## 2019-05-30 PROCEDURE — 64634 DESTROY C/TH FACET JNT ADDL: CPT | Performed by: ANESTHESIOLOGY

## 2019-05-30 PROCEDURE — 64633 DESTROY CERV/THOR FACET JNT: CPT | Performed by: ANESTHESIOLOGY

## 2019-05-30 PROCEDURE — 99152 MOD SED SAME PHYS/QHP 5/>YRS: CPT | Performed by: ANESTHESIOLOGY

## 2019-05-30 NOTE — PROGRESS NOTES
Cervical Facet Nerve (Medial Branch) Radiofrequency Lesioning      [facility]      PREOPERATIVE DIAGNOSIS:  Cervical spondylosis without myelopathy   POSTOPERATIVE DIAGNOSIS:  Same as preoperative diagnosis    PROCEDURE:    Cervical Facet Nerve (medial branch) RF, with Fluoroscopy:      LEVELS: bilateral  C2, C3, C4 and C5    PRE-PROCEDURE DISCUSSION WITH PATIENT:    Risks and complications were discussed with the patient prior to starting the procedure and informed consent was obtained.  We discussed various topics, including but not limited to bleeding, infection, injury, nerve injury, paralysis, coma, death, postprocedural soreness or painful flare, worsening of clinical picture, lack of pain relief.  We discussed the previous positive diagnostic nature of medial branch blockades.      SURGEON:  Ankit Kolb MD    REASON FOR PROCEDURE:    The patient presents with chronic axial neck pain. The patient underwent 2 bilateral cervical medial branch blocks with diagnostically positive relief. Given the patient’s significant pain relief, it is diagnostic that we have likely found the source of the patient’s pain; therefore, radiofrequency ablation of those nerves has been indicated for therapeutic pain relief.    SEDATION:  Fentanyl 150 mcg IV and Versed 6mg IV  TIME OF PROCEDURE:  After administration of the IV sedative, the intraoperative procedure time was 27 minutes.      ANESTHETIC:   Lidocaine 2%  STEROID:   NONE  TOTAL VOLUME OF SOLUTION:  8 ml      DESCRIPTON OF PROCEDURE:  After obtaining informed consent, the patient was placed in the prone position. IV access was obtained.  EKG, blood pressure, and pulse oximeter were monitored and any & all sedation was administered by an RN under my direct guidance.  The cervical area was prepped with Chloraprep and draped with sterile barrier.     Under fluoroscopic guidance the waists of the above mentioned vertebra were identified and marked at  the lateral margin of the vertebrae on the AP fluoroscopic view.  Skin and subcutaneous tissue were then anesthetized with 1% lidocaine 1mL at each point.  Then RF cannula needles were sequentially introduced under fluoroscopic guidance to the waists of these vertebrae contacting periosteum on the lateral edge of the vertebra on the AP fluroscopic view. After confirming the position of the needle under fluoroscopic PA and lateral views, confirming the needle position in the center of the trapezoid at each level, and confirming negative aspiration of blood and CSF, testing was initiated.  There was a lack of radicular stimulation on each needle at 3v and 2Hz.      Then, in each needle, 1mL of the aforementioned local anesthetic was instilled.  After 2 minutes had elapsed, Radiofrequency thermal lesioning was initiated for 2 minutes at 80 degrees Celsius.      Needles were removed intact from all levels.  Vitals were stable throughout.       ESTIMATED BLOOD LOSS:  minimal  SPECIMENS:  None    COMPLICATIONS:    No complications were noted. and The patient did not have any signs of postprocedure numbness nor weakness.    TOLERANCE & DISCHARGE CONDITION:    The patient tolerated the procedure well.  The patient was transported to the recovery area without difficulties.  The patient was discharged to home under the care of family in stable and satisfactory condition.  The patient did notice improvement in pain on extension and rotation of the cervical spine.      PLAN OF CARE:  1. The patient was given our standard instruction sheet.  2. We discussed that Cervical Medial Branch Blockade is a diagnostic procedure in consideration for radiofrequency ablation if two diagnostic procedures proved to be positive for significant benefit.  If sustained relief of six to eight weeks is obtained, then an alternative plan could be therapeutic cervical medial branch blocks on an as-needed basis.  3. The patient is asked to keep a pain  log hourly for 8 hours postoperatively today.  4. The patient will Return to clinic 6 wks.  5. The patient will resume all medications as per the medication reconciliation sheet.

## 2019-06-06 ENCOUNTER — TELEPHONE (OUTPATIENT)
Dept: PAIN MEDICINE | Facility: CLINIC | Age: 54
End: 2019-06-06

## 2019-06-06 NOTE — TELEPHONE ENCOUNTER
She said she will give it a few more days and if it is still bothering her she will give us a call back.

## 2019-06-06 NOTE — TELEPHONE ENCOUNTER
Patient called and stated that she had an RF last week. Her head is achy and sore, she is having tingling. Her neck is swollen and red. She has been using advil and ice. What do you recommend?

## 2019-06-06 NOTE — TELEPHONE ENCOUNTER
Sunburn feeling after RF is normal. Can come for steroid or toradol shot. Or we can send in medrol dosepack. Let me know.thanks. BB    Reviewed RED FLAG SYMPTOMS, including but not limited to-- fever, chills, stiff neck, headache, flu-like symptoms, increased tenderness and redness. Also reviewed loss of bowel and bladder control and saddle anesthesia. If patient notices this, he is to call office immediately. Patient verbalized understanding.

## 2019-07-09 ENCOUNTER — APPOINTMENT (OUTPATIENT)
Dept: WOMENS IMAGING | Facility: HOSPITAL | Age: 54
End: 2019-07-09

## 2019-07-15 ENCOUNTER — OFFICE VISIT (OUTPATIENT)
Dept: PAIN MEDICINE | Facility: CLINIC | Age: 54
End: 2019-07-15

## 2019-07-15 VITALS
HEIGHT: 62 IN | OXYGEN SATURATION: 98 % | WEIGHT: 140.2 LBS | RESPIRATION RATE: 16 BRPM | SYSTOLIC BLOOD PRESSURE: 130 MMHG | DIASTOLIC BLOOD PRESSURE: 86 MMHG | HEART RATE: 59 BPM | TEMPERATURE: 98.5 F | BODY MASS INDEX: 25.8 KG/M2

## 2019-07-15 DIAGNOSIS — M54.2 CHRONIC NECK PAIN: Primary | ICD-10-CM

## 2019-07-15 DIAGNOSIS — M54.81 BILATERAL OCCIPITAL NEURALGIA: ICD-10-CM

## 2019-07-15 DIAGNOSIS — G89.29 CHRONIC NECK PAIN: Primary | ICD-10-CM

## 2019-07-15 PROCEDURE — 99212 OFFICE O/P EST SF 10 MIN: CPT | Performed by: NURSE PRACTITIONER

## 2019-07-15 NOTE — PROGRESS NOTES
CHIEF COMPLAINT  F/U neck pain- Cervical Facet Nerve (Medial Branch) Radiofrequency Lesioning- patient states that her pain was relieved 40%. She states that she is still having pain at this time.     Subjective   Carolin Zapien is a 54 y.o. female  who presents to the office for follow-up of procedure.  She completed a bilateral C2-C5 RFL   on  5-30-19 performed by Dr. FORD for management of NECK PAIN. Patient reports 40% relief from the procedure.     Complains of pain in her neck. Today her pain is 2/10VAS. Describes the pain as now as intermittent aching and throbbing.  She has been taking TYlenol and Advil pretty regularly. But admits that the pain is lower than where the RFL was performed. Has been using heat and ice.     Neck Pain    This is a chronic problem. The current episode started more than 1 year ago. The problem occurs constantly. The problem has been gradually worsening (temporary relief with cervical MBB; varaible since last office visit). The pain is present in the midline. The pain is at a severity of 2/10. The pain is moderate. The symptoms are aggravated by bending, position and twisting. The pain is worse during the day. Associated symptoms include headaches (Since back injection) and numbness (neck). Pertinent negatives include no chest pain, fever or pain with swallowing.          PEG Assessment   What number best describes your pain on average in the past week?3  What number best describes how, during the past week, pain has interfered with your enjoyment of life?4  What number best describes how, during the past week, pain has interfered with your general activity?  1    The following portions of the patient's history were reviewed and updated as appropriate: allergies, current medications, past family history, past medical history, past social history, past surgical history and problem list.    Review of Systems   Constitutional: Negative for fever.   HENT: Negative for congestion.   "  Respiratory: Negative for chest tightness and shortness of breath.    Cardiovascular: Negative for chest pain.   Gastrointestinal: Negative for abdominal pain, constipation and diarrhea.   Genitourinary: Negative for difficulty urinating, dyspareunia and dysuria.   Musculoskeletal: Positive for neck pain (Pt sts all the time).   Neurological: Positive for numbness (neck) and headaches (Since back injection). Negative for dizziness and light-headedness.   Psychiatric/Behavioral: Positive for sleep disturbance (occ). Negative for agitation. The patient is not nervous/anxious.        Vitals:    07/15/19 1130   BP: 130/86   Pulse: 59   Resp: 16   Temp: 98.5 °F (36.9 °C)   SpO2: 98%   Weight: 63.6 kg (140 lb 3.2 oz)   Height: 156.2 cm (61.5\")   PainSc:   2   PainLoc: Neck     Objective   Physical Exam   Constitutional: She is oriented to person, place, and time. Vital signs are normal. She appears well-developed and well-nourished. She is cooperative.   HENT:   Head: Normocephalic and atraumatic.   Nose: Nose normal.   Eyes: Conjunctivae and lids are normal.   Neck: Trachea normal. Neck supple. Spinous process tenderness (moderate tenderness of bilateral C2-C5 facets; + loading manuever) and muscular tenderness (no distinct trigger point) present. Decreased range of motion present.   Cardiovascular: Normal rate.   Pulmonary/Chest: Effort normal.   Abdominal: Normal appearance.   Neurological: She is alert and oriented to person, place, and time. Gait normal.   Skin: Skin is warm, dry and intact.   Psychiatric: She has a normal mood and affect. Her speech is normal and behavior is normal. Judgment and thought content normal. Cognition and memory are normal.   Nursing note and vitals reviewed.          Assessment/Plan   Carolin was seen today for neck pain.    Diagnoses and all orders for this visit:    Chronic neck pain    Bilateral occipital neuralgia      --- Patient declines steroids or Toradol. She wants to wait on " this.   --- Reviewed expectations related to RFA.  --- Follow-up 6 weeks or sooner if needed.       NICOLE REPORT  NICOLE report has been reviewed and scanned into the patient's chart.    As the clinician, I personally reviewed the NICOLE from 7-12-19 while the patient was in the office today.           EMR Dragon/Transcription disclaimer:   Much of this encounter note is an electronic transcription/translation of spoken language to printed text. The electronic translation of spoken language may permit erroneous, or at times, nonsensical words or phrases to be inadvertently transcribed; Although I have reviewed the note for such errors, some may still exist.

## 2019-07-22 ENCOUNTER — OFFICE VISIT (OUTPATIENT)
Dept: FAMILY MEDICINE CLINIC | Facility: CLINIC | Age: 54
End: 2019-07-22

## 2019-07-22 VITALS
RESPIRATION RATE: 16 BRPM | OXYGEN SATURATION: 98 % | DIASTOLIC BLOOD PRESSURE: 68 MMHG | SYSTOLIC BLOOD PRESSURE: 120 MMHG | HEART RATE: 54 BPM | BODY MASS INDEX: 25.93 KG/M2 | WEIGHT: 140.9 LBS | HEIGHT: 62 IN

## 2019-07-22 DIAGNOSIS — Z00.00 HEALTHCARE MAINTENANCE: Primary | ICD-10-CM

## 2019-07-22 DIAGNOSIS — E03.9 ADULT HYPOTHYROIDISM: ICD-10-CM

## 2019-07-22 PROCEDURE — 99396 PREV VISIT EST AGE 40-64: CPT | Performed by: FAMILY MEDICINE

## 2019-07-22 NOTE — PROGRESS NOTES
ASSESSMENT AND PLAN  Problem List Items Addressed This Visit        Endocrine    Adult hypothyroidism    Overview     [place] 7/22/2019  Labs are drawn today.             Other Visit Diagnoses     Healthcare maintenance    -  Primary    Relevant Orders    Comprehensive metabolic panel    Lipid Panel With LDL/HDL Ratio    CBC and Differential    TSH        F/U and PATIENT  INSTRUCTIONS    Return in about 1 year (around 7/22/2020) for lab with next visit, Annual physical.    Sent a question to cardiology re thickened valve on echo.       SUBJECTIVE  Carolin Zapien is a 54 y.o. female being seen in our office today for Annual Exam               Social History  She  reports that she quit smoking about 13 years ago. She has never used smokeless tobacco. She reports that she drinks alcohol. She reports that she does not use drugs.    History of the Present Illness   HPI  Annual Exam: Patient presents for annual exam.  findings; last pap: approximate date 7/2018 and was normal  Last mammo: approximate date 7/2018 and was normal   The patient is not  still having menses.Last menstrual period: > 5 years ago.  History; colonoscopy: Last colonoscopy: colonoscopy 9 years ago without abnormalities.    The patient wears seatbelts: yes.  She is eating a healthy diet.   The patient regularly exercises: yes. She does walking     She does see a dentist regularly.   Is she using sunscreen: yes  Her immunization are up-to-date.  She is eating a healthy diet.     Significant Past History  The following portions of the patient's history were reviewed and updated as appropriate:PMHroutine: Social history , Past Medical History, Surgical history , Allergies, Current Medications, Active Problem List, Family History and Health Maintenance She did have some procedures with her back. Sees Dr. Kolb.     Review of Systems   Constitutional: Negative for activity change, appetite change, chills, fatigue, fever and unexpected weight change.    HENT: Negative for congestion, ear pain, hearing loss, nosebleeds, rhinorrhea and sore throat.    Eyes: Negative for pain, redness and visual disturbance.   Respiratory: Negative for cough, shortness of breath and wheezing.    Cardiovascular: Negative for chest pain, palpitations and leg swelling.   Gastrointestinal: Negative for abdominal pain, blood in stool, constipation, diarrhea, nausea and vomiting.   Endocrine: Negative for cold intolerance and heat intolerance.   Genitourinary: Negative for difficulty urinating, dysuria, frequency, hematuria, pelvic pain, urgency and vaginal discharge.   Musculoskeletal: Negative for arthralgias, back pain and joint swelling.   Skin: Negative for rash and wound.   Neurological: Negative for dizziness, weakness, numbness and headaches.   Hematological: Does not bruise/bleed easily.   Psychiatric/Behavioral: Negative for dysphoric mood, sleep disturbance and suicidal ideas. The patient is not nervous/anxious.        OBJECTIVE   Vital Signs          BP Readings from Last 1 Encounters:   07/22/19 120/68     Wt Readings from Last 3 Encounters:   07/22/19 63.9 kg (140 lb 14.4 oz)   07/15/19 63.6 kg (140 lb 3.2 oz)   04/30/19 65.8 kg (145 lb)   Body mass index is 26.19 kg/m².     Physical Exam   Constitutional: She is oriented to person, place, and time. She appears well-developed and well-nourished. No distress.   HENT:   Head: Normocephalic and atraumatic.   Right Ear: Tympanic membrane, external ear and ear canal normal.   Left Ear: Tympanic membrane, external ear and ear canal normal.   Mouth/Throat: Oropharynx is clear and moist.   Eyes: Conjunctivae are normal.   Neck: Normal range of motion. Neck supple. No tracheal deviation present. No thyromegaly present.   Cardiovascular: Normal rate, regular rhythm, normal heart sounds and intact distal pulses.   Pulmonary/Chest: Effort normal and breath sounds normal. No respiratory distress. She has no wheezes. She has no rales.    Abdominal: Soft. Bowel sounds are normal. She exhibits no distension. There is no hepatosplenomegaly. There is no tenderness.   Musculoskeletal: She exhibits no edema or deformity.   Lymphadenopathy:     She has no cervical adenopathy.   Neurological: She is alert and oriented to person, place, and time.   Skin: Skin is warm and dry.   Psychiatric: She has a normal mood and affect. Her behavior is normal. Judgment and thought content normal.   Vitals reviewed.    Data Reviewed

## 2019-07-23 LAB
ALBUMIN SERPL-MCNC: 4.6 G/DL (ref 3.5–5.2)
ALBUMIN/GLOB SERPL: 2.2 G/DL
ALP SERPL-CCNC: 67 U/L (ref 39–117)
ALT SERPL-CCNC: 11 U/L (ref 1–33)
AST SERPL-CCNC: 19 U/L (ref 1–32)
BASOPHILS # BLD AUTO: (no result) 10*3/UL
BASOPHILS # BLD MANUAL: 0.06 10*3/MM3 (ref 0–0.2)
BASOPHILS NFR BLD MANUAL: 1 % (ref 0–1.5)
BILIRUB SERPL-MCNC: 0.3 MG/DL (ref 0.2–1.2)
BUN SERPL-MCNC: 11 MG/DL (ref 6–20)
BUN/CREAT SERPL: 16.4 (ref 7–25)
CALCIUM SERPL-MCNC: 8.8 MG/DL (ref 8.6–10.5)
CHLORIDE SERPL-SCNC: 104 MMOL/L (ref 98–107)
CHOLEST SERPL-MCNC: 199 MG/DL (ref 0–200)
CO2 SERPL-SCNC: 26.8 MMOL/L (ref 22–29)
CREAT SERPL-MCNC: 0.67 MG/DL (ref 0.57–1)
DIFFERENTIAL COMMENT: ABNORMAL
EOSINOPHIL # BLD AUTO: (no result) 10*3/UL
EOSINOPHIL # BLD MANUAL: 0.06 10*3/MM3 (ref 0–0.4)
EOSINOPHIL NFR BLD AUTO: (no result) %
EOSINOPHIL NFR BLD MANUAL: 1 % (ref 0.3–6.2)
ERYTHROCYTE [DISTWIDTH] IN BLOOD BY AUTOMATED COUNT: 12.4 % (ref 12.3–15.4)
GLOBULIN SER CALC-MCNC: 2.1 GM/DL
GLUCOSE SERPL-MCNC: 87 MG/DL (ref 65–99)
HCT VFR BLD AUTO: 43.6 % (ref 34–46.6)
HDLC SERPL-MCNC: 52 MG/DL (ref 40–60)
HGB BLD-MCNC: 14 G/DL (ref 12–15.9)
LDLC SERPL CALC-MCNC: 124 MG/DL (ref 0–100)
LDLC/HDLC SERPL: 2.38 {RATIO}
LYMPHOCYTES # BLD AUTO: (no result) 10*3/UL
LYMPHOCYTES # BLD MANUAL: 2.16 10*3/MM3 (ref 0.7–3.1)
LYMPHOCYTES NFR BLD AUTO: (no result) %
LYMPHOCYTES NFR BLD MANUAL: 39 % (ref 19.6–45.3)
MCH RBC QN AUTO: 30.7 PG (ref 26.6–33)
MCHC RBC AUTO-ENTMCNC: 32.1 G/DL (ref 31.5–35.7)
MCV RBC AUTO: 95.6 FL (ref 79–97)
MONOCYTES # BLD MANUAL: 0.17 10*3/MM3 (ref 0.1–0.9)
MONOCYTES NFR BLD AUTO: (no result) %
MONOCYTES NFR BLD MANUAL: 3 % (ref 5–12)
NEUTROPHILS # BLD MANUAL: 3.1 10*3/MM3 (ref 1.7–7)
NEUTROPHILS NFR BLD AUTO: (no result) %
NEUTROPHILS NFR BLD MANUAL: 56 % (ref 42.7–76)
PLATELET # BLD AUTO: 215 10*3/MM3 (ref 140–450)
PLATELET BLD QL SMEAR: ABNORMAL
POTASSIUM SERPL-SCNC: 4.2 MMOL/L (ref 3.5–5.2)
PROT SERPL-MCNC: 6.7 G/DL (ref 6–8.5)
RBC # BLD AUTO: 4.56 10*6/MM3 (ref 3.77–5.28)
RBC MORPH BLD: ABNORMAL
SODIUM SERPL-SCNC: 144 MMOL/L (ref 136–145)
TRIGL SERPL-MCNC: 117 MG/DL (ref 0–150)
TSH SERPL DL<=0.005 MIU/L-ACNC: 4.01 MIU/ML (ref 0.27–4.2)
VLDLC SERPL CALC-MCNC: 23.4 MG/DL
WBC # BLD AUTO: 5.53 10*3/MM3 (ref 3.4–10.8)

## 2019-07-26 ENCOUNTER — TELEPHONE (OUTPATIENT)
Dept: FAMILY MEDICINE CLINIC | Facility: CLINIC | Age: 54
End: 2019-07-26

## 2019-07-29 DIAGNOSIS — E03.9 ADULT HYPOTHYROIDISM: Primary | ICD-10-CM

## 2019-07-29 RX ORDER — LEVOTHYROXINE SODIUM 0.1 MG/1
100 TABLET ORAL DAILY
Qty: 90 TABLET | Refills: 0 | Status: SHIPPED | OUTPATIENT
Start: 2019-07-29 | End: 2019-10-24 | Stop reason: SDUPTHER

## 2019-09-12 ENCOUNTER — APPOINTMENT (OUTPATIENT)
Dept: WOMENS IMAGING | Facility: HOSPITAL | Age: 54
End: 2019-09-12

## 2019-09-12 PROCEDURE — 77063 BREAST TOMOSYNTHESIS BI: CPT | Performed by: RADIOLOGY

## 2019-09-12 PROCEDURE — 77067 SCR MAMMO BI INCL CAD: CPT | Performed by: RADIOLOGY

## 2019-10-14 ENCOUNTER — CLINICAL SUPPORT (OUTPATIENT)
Dept: FAMILY MEDICINE CLINIC | Facility: CLINIC | Age: 54
End: 2019-10-14

## 2019-10-14 PROCEDURE — 90674 CCIIV4 VAC NO PRSV 0.5 ML IM: CPT | Performed by: NURSE PRACTITIONER

## 2019-10-14 PROCEDURE — 90471 IMMUNIZATION ADMIN: CPT | Performed by: NURSE PRACTITIONER

## 2019-10-24 DIAGNOSIS — E03.9 ADULT HYPOTHYROIDISM: ICD-10-CM

## 2019-10-25 RX ORDER — LEVOTHYROXINE SODIUM 0.1 MG/1
TABLET ORAL
Qty: 90 TABLET | Refills: 0 | Status: SHIPPED | OUTPATIENT
Start: 2019-10-25 | End: 2020-01-06

## 2020-01-05 DIAGNOSIS — E03.9 ADULT HYPOTHYROIDISM: ICD-10-CM

## 2020-01-06 RX ORDER — LEVOTHYROXINE SODIUM 0.1 MG/1
TABLET ORAL
Qty: 90 TABLET | Refills: 2 | Status: SHIPPED | OUTPATIENT
Start: 2020-01-06 | End: 2020-09-22

## 2020-07-14 ENCOUNTER — APPOINTMENT (OUTPATIENT)
Dept: WOMENS IMAGING | Facility: HOSPITAL | Age: 55
End: 2020-07-14

## 2020-07-14 PROCEDURE — 77063 BREAST TOMOSYNTHESIS BI: CPT | Performed by: RADIOLOGY

## 2020-07-14 PROCEDURE — 77067 SCR MAMMO BI INCL CAD: CPT | Performed by: RADIOLOGY

## 2020-07-28 ENCOUNTER — PREP FOR SURGERY (OUTPATIENT)
Dept: OTHER | Facility: HOSPITAL | Age: 55
End: 2020-07-28

## 2020-07-28 DIAGNOSIS — Z12.11 ENCOUNTER FOR SCREENING FOR MALIGNANT NEOPLASM OF COLON: Primary | ICD-10-CM

## 2020-07-31 DIAGNOSIS — R53.83 FATIGUE, UNSPECIFIED TYPE: ICD-10-CM

## 2020-07-31 DIAGNOSIS — E03.9 ADULT HYPOTHYROIDISM: Primary | ICD-10-CM

## 2020-07-31 DIAGNOSIS — E78.00 ELEVATED CHOLESTEROL: ICD-10-CM

## 2020-07-31 DIAGNOSIS — Z13.89 ENCOUNTER FOR SCREENING FOR OTHER DISORDER: ICD-10-CM

## 2020-07-31 DIAGNOSIS — Z00.00 LABORATORY EXAMINATION ORDERED AS PART OF A ROUTINE GENERAL MEDICAL EXAMINATION: ICD-10-CM

## 2020-08-01 LAB
ALBUMIN SERPL-MCNC: 4.5 G/DL (ref 3.5–5.2)
ALBUMIN/GLOB SERPL: 1.9 G/DL
ALP SERPL-CCNC: 65 U/L (ref 39–117)
ALT SERPL-CCNC: 17 U/L (ref 1–33)
AST SERPL-CCNC: 21 U/L (ref 1–32)
BASOPHILS # BLD AUTO: NORMAL 10*3/UL
BASOPHILS # BLD MANUAL: 0.1 10*3/MM3 (ref 0–0.2)
BASOPHILS NFR BLD MANUAL: 2 % (ref 0–1.5)
BILIRUB SERPL-MCNC: 0.3 MG/DL (ref 0–1.2)
BUN SERPL-MCNC: 8 MG/DL (ref 6–20)
BUN/CREAT SERPL: 11.1 (ref 7–25)
CALCIUM SERPL-MCNC: 9.2 MG/DL (ref 8.6–10.5)
CHLORIDE SERPL-SCNC: 105 MMOL/L (ref 98–107)
CHOLEST SERPL-MCNC: 213 MG/DL (ref 0–200)
CHOLEST/HDLC SERPL: 3.87 {RATIO}
CO2 SERPL-SCNC: 26.2 MMOL/L (ref 22–29)
CREAT SERPL-MCNC: 0.72 MG/DL (ref 0.57–1)
DIFFERENTIAL COMMENT: ABNORMAL
EOSINOPHIL # BLD AUTO: NORMAL 10*3/UL
EOSINOPHIL # BLD MANUAL: 0.16 10*3/MM3 (ref 0–0.4)
EOSINOPHIL NFR BLD AUTO: NORMAL %
EOSINOPHIL NFR BLD MANUAL: 3 % (ref 0.3–6.2)
ERYTHROCYTE [DISTWIDTH] IN BLOOD BY AUTOMATED COUNT: 12.4 % (ref 12.3–15.4)
GLOBULIN SER CALC-MCNC: 2.4 GM/DL
GLUCOSE SERPL-MCNC: 92 MG/DL (ref 65–99)
HCT VFR BLD AUTO: 41.3 % (ref 34–46.6)
HDLC SERPL-MCNC: 55 MG/DL (ref 40–60)
HGB BLD-MCNC: 14.1 G/DL (ref 12–15.9)
LDLC SERPL CALC-MCNC: 139 MG/DL (ref 0–100)
LYMPHOCYTES # BLD AUTO: NORMAL 10*3/UL
LYMPHOCYTES # BLD MANUAL: 1.09 10*3/MM3 (ref 0.7–3.1)
LYMPHOCYTES NFR BLD AUTO: NORMAL %
LYMPHOCYTES NFR BLD MANUAL: 21 % (ref 19.6–45.3)
MCH RBC QN AUTO: 31.5 PG (ref 26.6–33)
MCHC RBC AUTO-ENTMCNC: 34.1 G/DL (ref 31.5–35.7)
MCV RBC AUTO: 92.2 FL (ref 79–97)
MONOCYTES # BLD MANUAL: 0.42 10*3/MM3 (ref 0.1–0.9)
MONOCYTES NFR BLD AUTO: NORMAL %
MONOCYTES NFR BLD MANUAL: 8 % (ref 5–12)
NEUTROPHILS # BLD MANUAL: 3.43 10*3/MM3 (ref 1.7–7)
NEUTROPHILS NFR BLD AUTO: NORMAL %
NEUTROPHILS NFR BLD MANUAL: 66 % (ref 42.7–76)
PLATELET # BLD AUTO: 223 10*3/MM3 (ref 140–450)
PLATELET BLD QL SMEAR: ABNORMAL
POTASSIUM SERPL-SCNC: 4.2 MMOL/L (ref 3.5–5.2)
PROT SERPL-MCNC: 6.9 G/DL (ref 6–8.5)
RBC # BLD AUTO: 4.48 10*6/MM3 (ref 3.77–5.28)
RBC MORPH BLD: ABNORMAL
SODIUM SERPL-SCNC: 139 MMOL/L (ref 136–145)
TRIGL SERPL-MCNC: 96 MG/DL (ref 0–150)
TSH SERPL DL<=0.005 MIU/L-ACNC: 2.11 UIU/ML (ref 0.27–4.2)
VLDLC SERPL CALC-MCNC: 19.2 MG/DL (ref 5–40)
WBC # BLD AUTO: 5.2 10*3/MM3 (ref 3.4–10.8)

## 2020-08-03 ENCOUNTER — OFFICE VISIT (OUTPATIENT)
Dept: FAMILY MEDICINE CLINIC | Facility: CLINIC | Age: 55
End: 2020-08-03

## 2020-08-03 VITALS
HEIGHT: 62 IN | HEART RATE: 65 BPM | WEIGHT: 138 LBS | RESPIRATION RATE: 12 BRPM | SYSTOLIC BLOOD PRESSURE: 118 MMHG | BODY MASS INDEX: 25.4 KG/M2 | OXYGEN SATURATION: 99 % | DIASTOLIC BLOOD PRESSURE: 68 MMHG

## 2020-08-03 DIAGNOSIS — E03.9 ADULT HYPOTHYROIDISM: ICD-10-CM

## 2020-08-03 DIAGNOSIS — Z00.00 HEALTHCARE MAINTENANCE: Primary | ICD-10-CM

## 2020-08-03 DIAGNOSIS — Z00.00 LABORATORY EXAMINATION ORDERED AS PART OF A ROUTINE GENERAL MEDICAL EXAMINATION: ICD-10-CM

## 2020-08-03 LAB
Lab: NORMAL
SPECIMEN STATUS: NORMAL

## 2020-08-03 PROCEDURE — 99396 PREV VISIT EST AGE 40-64: CPT | Performed by: FAMILY MEDICINE

## 2020-08-03 NOTE — PROGRESS NOTES
ASSESSMENT AND PLAN     Problem List Items Addressed This Visit        Endocrine    Adult hypothyroidism    Overview     [place] 8/3/2020  Labs are good from last week.           Other Visit Diagnoses     Healthcare maintenance    -  Primary    Laboratory examination ordered as part of a routine general medical examination            Return in about 1 year (around 8/3/2021) for lab with next visit, Annual physical.  Patient was given instructions and counseling regarding her condition or for health maintenance advice. Please see specific information pulled into the AVS if appropriate.        SUBJECTIVE   Carolin Zapien is a 55 y.o. female being seen today for Annual Exam               Social History  She  reports that she quit smoking about 14 years ago. She has never used smokeless tobacco. She reports current alcohol use. She reports that she does not use drugs.    History of the Present Illness   HPI Annual Exam: Patient presents for annual exam.  findings; last pap: approximate date 2018 and was normal  Last mammo: approximate date 2020 and was normal   The patient is not  still having menses.Last menstrual period: >1 year ago.  History; colonoscopy: Last colonoscopy: colonoscopy 3 years ago without abnormalities.    The patient wears seatbelts: yes.  Practicing social distancing, handwashing and keeping hands from face? yes  She is eating a healthy diet.   The patient regularly exercises: yes. She does walking   This patient has ever been tested for HepC: no    She does see a dentist regularly.   Is she using sunscreen: yes  Her immunization are up-to-date.    Significant Past History  The following portions of the patient's history were reviewed and updated as appropriate:PMHroutine: Social history , Past Medical History, Surgical history , Allergies, Current Medications, Active Problem List, Family History and Health Maintenance    Review of Systems   Constitutional: Negative for activity change, appetite  change, chills, fatigue, fever and unexpected weight change.   HENT: Negative for congestion, ear pain, hearing loss, nosebleeds, rhinorrhea and sore throat.    Eyes: Negative for pain, redness and visual disturbance.   Respiratory: Negative for cough, shortness of breath and wheezing.    Cardiovascular: Negative for chest pain, palpitations and leg swelling.   Gastrointestinal: Negative for abdominal pain, blood in stool, constipation, diarrhea, nausea and vomiting.   Endocrine: Negative for cold intolerance and heat intolerance.   Genitourinary: Negative for difficulty urinating, dysuria, frequency, hematuria, pelvic pain, urgency and vaginal discharge.   Musculoskeletal: Positive for arthralgias (pain in left hip that is sometimes stiff with the morning. Difficulty getting up in the morning. Hurts in the middle of the night. ) and joint swelling (right 2nd metatarsal with get hot and red sometimes. She can't lift things with it. ).   Skin: Negative for rash and wound.   Neurological: Negative for dizziness, weakness, numbness and headaches.   Hematological: Does not bruise/bleed easily.   Psychiatric/Behavioral: Negative for dysphoric mood, sleep disturbance and suicidal ideas. The patient is not nervous/anxious.    I have reviewed the ROS as documented by the MA. Shazia Balderas MD      OBJECTIVE  Vital Signs          BP Readings from Last 1 Encounters:   08/21/20 132/83     Wt Readings from Last 3 Encounters:   08/10/20 63.5 kg (140 lb 1.6 oz)   08/03/20 62.6 kg (138 lb)   07/22/19 63.9 kg (140 lb 14.4 oz)   Body mass index is 25.65 kg/m².     Physical Exam   Constitutional: She is oriented to person, place, and time. She appears well-developed and well-nourished. No distress.   HENT:   Head: Normocephalic and atraumatic.   Right Ear: Tympanic membrane, external ear and ear canal normal.   Left Ear: Tympanic membrane, external ear and ear canal normal.   Mouth/Throat: Oropharynx is clear and moist.   Eyes:  Conjunctivae are normal.   Neck: Normal range of motion. Neck supple. No tracheal deviation present. No thyromegaly present.   Cardiovascular: Normal rate, regular rhythm, normal heart sounds and intact distal pulses.   Pulmonary/Chest: Effort normal and breath sounds normal. No respiratory distress. She has no wheezes. She has no rales. Right breast exhibits no mass. Left breast exhibits no mass. No breast swelling or tenderness. Breasts are symmetrical.   Abdominal: Soft. Bowel sounds are normal. She exhibits no distension. There is no hepatosplenomegaly. There is no tenderness.   Genitourinary: No breast swelling or tenderness.   Musculoskeletal: She exhibits no edema or deformity.   Lymphadenopathy:     She has no cervical adenopathy.   Neurological: She is alert and oriented to person, place, and time.   Skin: Skin is warm and dry.   Psychiatric: She has a normal mood and affect. Her behavior is normal. Judgment and thought content normal.   Vitals reviewed.    Data Reviewed       Recent Results (from the past 2016 hour(s))   Comprehensive metabolic panel    Collection Time: 07/31/20  8:29 AM   Result Value Ref Range    Glucose 92 65 - 99 mg/dL    BUN 8 6 - 20 mg/dL    Creatinine 0.72 0.57 - 1.00 mg/dL    eGFR Non African Am 84 >60 mL/min/1.73    Total Protein 6.9 6.0 - 8.5 g/dL    Albumin 4.50 3.50 - 5.20 g/dL    Globulin 2.4 gm/dL    A/G Ratio 1.9 g/dL    Total Bilirubin 0.3 0.0 - 1.2 mg/dL    Alkaline Phosphatase 65 39 - 117 U/L    AST (SGOT) 21 1 - 32 U/L    ALT (SGPT) 17 1 - 33 U/L   Lipid Panel With / Chol / HDL Ratio    Collection Time: 07/31/20  8:29 AM   Result Value Ref Range    Total Cholesterol 213 (H) 0 - 200 mg/dL    Triglycerides 96 0 - 150 mg/dL    HDL Cholesterol 55 40 - 60 mg/dL    VLDL Cholesterol 19.2 5 - 40 mg/dL    LDL Cholesterol  139 (H) 0 - 100 mg/dL    Chol/HDL Ratio 3.87    TSH    Collection Time: 07/31/20  8:29 AM   Result Value Ref Range    TSH 2.110 0.270 - 4.200 uIU/mL   CBC &  Differential    Collection Time: 07/31/20  8:29 AM   Result Value Ref Range    WBC 5.20 3.40 - 10.80 10*3/mm3    RBC 4.48 3.77 - 5.28 10*6/mm3    Hemoglobin 14.1 12.0 - 15.9 g/dL    Hematocrit 41.3 34.0 - 46.6 %    MCV 92.2 79.0 - 97.0 fL    MCH 31.5 26.6 - 33.0 pg    MCHC 34.1 31.5 - 35.7 g/dL    RDW 12.4 12.3 - 15.4 %    Platelets 223 140 - 450 10*3/mm3       Would like to get inflammatory markers next time she has a flare up with hand. ESR, RA and DEVON.     Has an appointment for cscope with Dr. Davison in near future.

## 2020-08-04 LAB — HCV AB S/CO SERPL IA: 0.1 S/CO RATIO (ref 0–0.9)

## 2020-08-10 ENCOUNTER — OFFICE VISIT (OUTPATIENT)
Dept: GASTROENTEROLOGY | Facility: CLINIC | Age: 55
End: 2020-08-10

## 2020-08-10 VITALS — BODY MASS INDEX: 26.45 KG/M2 | WEIGHT: 140.1 LBS | HEIGHT: 61 IN

## 2020-08-10 DIAGNOSIS — Z12.11 ENCOUNTER FOR SCREENING FOR MALIGNANT NEOPLASM OF COLON: Primary | ICD-10-CM

## 2020-08-10 PROCEDURE — 99204 OFFICE O/P NEW MOD 45 MIN: CPT | Performed by: INTERNAL MEDICINE

## 2020-08-10 NOTE — H&P (VIEW-ONLY)
"Chief Complaint   Patient presents with   • Heartburn       Carolin Zapien is a 55 y.o. female who presents with heartburn    55-year-old female with chronic heartburn, well controlled with Tums.  She has no vomiting, dysphagia or weight loss  No abdominal pain, occasional heartburn and regurgitation related night after spicy food.  No insomnia or aspiration due to heartburn  EGD 3 years ago was within normal limits  She is here wondering whether she needs to schedule EGD and also to schedule a screening colonoscopy      Past Medical History:   Diagnosis Date   • Asthma     \"MILD\"   USES INHALER MAYBE TWICE A YEAR   • Atypical chest pain 04/14/2014    RESOLVED; 07/20/2015   • Cataract     maria l premature   • DDD (degenerative disc disease), cervical    • DDD (degenerative disc disease), cervical    • Degeneration of cervical intervertebral disc    • Diverticulitis    • Fatigue    • GERD (gastroesophageal reflux disease)    • Kidney cyst, acquired 10/24/2015   • Kidney stone    • Migraines    • Multiple joint pain 07/18/2013   • Non-functioning gallbladder    • Raynaud's disease    • Seasonal allergies    • Seborrheic dermatitis    • Thyroid disease        Past Surgical History:   Procedure Laterality Date   • CHOLECYSTECTOMY N/A 12/29/2017    Procedure: CHOLECYSTECTOMY LAPAROSCOPIC;  Surgeon: Mc Gonzalez MD;  [place] [facility] [place] [facility] [place]  Service:    • COLPOSCOPY      WITH BX; RESOLVED   • DILATATION AND CURETTAGE     • ENDOSCOPY N/A 12/14/2017    Procedure: ESOPHAGOGASTRODUODENOSCOPY with biopsies;  Surgeon: Ganesh Davison MD;  Location: [facility] ENDOSCOPY;  Service:    • ENDOSCOPY AND COLONOSCOPY  2010   • GALLBLADDER SURGERY     • SEPTOPLASTY, TURBINECTOMY, ENDOSCOPIC MAXILLARY ANTROSTOMY      [facility]   • TONSILLECTOMY     • URETEROSCOPY      with cystoscopy         Current Outpatient Medications:   •  acetaminophen (TYLENOL) 500 MG tablet, Take 500 mg by mouth Every 6 (Six) Hours As Needed for Mild Pain ., Disp: " , Rfl:   •  albuterol (PROAIR HFA) 108 (90 BASE) MCG/ACT inhaler, Inhale 2 puffs every 4 (four) hours as needed for wheezing., Disp: 8 g, Rfl: 5  •  calcium carbonate EX (TUMS EX) 750 MG chewable tablet, Chew 750 mg Daily As Needed., Disp: , Rfl:   •  estradiol (VIVELLE-DOT) 0.025 MG/24HR patch, Place 1 patch on the skin 2 (Two) Times a Week., Disp: , Rfl:   •  fexofenadine (ALLEGRA ALLERGY) 180 MG tablet, Take 180 mg by mouth As Needed., Disp: , Rfl:   •  fluticasone (FLONASE) 50 MCG/ACT nasal spray, 2 sprays into each nostril Every Morning., Disp: , Rfl:   •  ibuprofen (ADVIL,MOTRIN) 200 MG tablet, Take 200 mg by mouth Every 6 (Six) Hours As Needed for Mild Pain ., Disp: , Rfl:   •  levothyroxine (SYNTHROID, LEVOTHROID) 100 MCG tablet, TAKE ONE TABLET BY MOUTH DAILY, Disp: 90 tablet, Rfl: 2  •  Misc Natural Products (OSTEO BI-FLEX TRIPLE STRENGTH) tablet, , Disp: , Rfl:   •  Probiotic Product (PROBIOTIC DAILY PO), Take 1 tablet by mouth Daily., Disp: , Rfl:   •  progesterone (PROMETRIUM) 100 MG capsule, Take 100 mg by mouth Every Night., Disp: , Rfl:   •  pseudoephedrine (SUDAFED) 120 MG 12 hr tablet, Take 120 mg by mouth Every 12 (Twelve) Hours As Needed., Disp: , Rfl:     Allergies   Allergen Reactions   • Pantoprazole Hives   • Sulfa Antibiotics Hives   • Terbinafine Hives       Social History     Socioeconomic History   • Marital status:      Spouse name: Not on file   • Number of children: 1   • Years of education: Not on file   • Highest education level: Bachelor's degree (e.g., BA, AB, BS)   Occupational History   • Occupation: HOMEMAKER   Social Needs   • Financial resource strain: Patient refused   • Food insecurity:     Worry: Patient refused     Inability: Patient refused   • Transportation needs:     Medical: Patient refused     Non-medical: Patient refused   Tobacco Use   • Smoking status: Former Smoker     Last attempt to quit: 2005     Years since quittin.7   • Smokeless tobacco:  Never Used   Substance and Sexual Activity   • Alcohol use: Yes     Comment: SOCIAL   • Drug use: No   • Sexual activity: Defer   Social History Narrative    LIVES WITH SPOUSE       Family History   Problem Relation Age of Onset   • Hodgkin's lymphoma Mother    • Migraines Mother    • Thyroid disease Mother    • Cancer Mother    • Hypertension Father    • Depression Father    • Atrial fibrillation Father    • Migraines Sister    • Hyperlipidemia Sister    • Cardiomyopathy Brother         resolved   • Lymphoma Son         Tcell   • Malig Hyperthermia Neg Hx        Review of Systems   Gastrointestinal: Negative for abdominal distention, abdominal pain, nausea, rectal pain and vomiting.   All other systems reviewed and are negative.      There were no vitals filed for this visit.    Physical Exam   Constitutional: She is oriented to person, place, and time. She appears well-developed and well-nourished.   HENT:   Head: Normocephalic and atraumatic.   Eyes: Pupils are equal, round, and reactive to light.   Cardiovascular: Normal rate, regular rhythm and normal heart sounds.   Pulmonary/Chest: Effort normal and breath sounds normal.   Abdominal: Soft. Bowel sounds are normal. She exhibits no shifting dullness, no distension, no pulsatile liver, no fluid wave, no abdominal bruit, no ascites, no pulsatile midline mass and no mass. There is no hepatosplenomegaly. There is no tenderness. There is no rigidity and no guarding. No hernia.   Musculoskeletal: Normal range of motion.   Neurological: She is alert and oriented to person, place, and time.   Skin: Skin is warm and dry.   Psychiatric: She has a normal mood and affect. Her behavior is normal. Thought content normal.   Nursing note and vitals reviewed.      Problem list    Heartburn well controlled with Tums  In need of screening colonoscopy      Assessment/Plan    Schedule screening colonoscopy with a split dose MiraLAX prep  No indication for EGD as her heartburn is  intermittent well controlled with Tums and she had a normal EGD 3 years ago  I have told her to call me immediately if she develops any vomiting or dysphagia  She should continue Tums as needed or Pepcid AC as needed

## 2020-08-10 NOTE — PROGRESS NOTES
"Chief Complaint   Patient presents with   • Heartburn       Carolin Zapien is a 55 y.o. female who presents with heartburn    55-year-old female with chronic heartburn, well controlled with Tums.  She has no vomiting, dysphagia or weight loss  No abdominal pain, occasional heartburn and regurgitation related night after spicy food.  No insomnia or aspiration due to heartburn  EGD 3 years ago was within normal limits  She is here wondering whether she needs to schedule EGD and also to schedule a screening colonoscopy      Past Medical History:   Diagnosis Date   • Asthma     \"MILD\"   USES INHALER MAYBE TWICE A YEAR   • Atypical chest pain 04/14/2014    RESOLVED; 07/20/2015   • Cataract     maria l premature   • DDD (degenerative disc disease), cervical    • DDD (degenerative disc disease), cervical    • Degeneration of cervical intervertebral disc    • Diverticulitis    • Fatigue    • GERD (gastroesophageal reflux disease)    • Kidney cyst, acquired 10/24/2015   • Kidney stone    • Migraines    • Multiple joint pain 07/18/2013   • Non-functioning gallbladder    • Raynaud's disease    • Seasonal allergies    • Seborrheic dermatitis    • Thyroid disease        Past Surgical History:   Procedure Laterality Date   • CHOLECYSTECTOMY N/A 12/29/2017    Procedure: CHOLECYSTECTOMY LAPAROSCOPIC;  Surgeon: Mc Gonzalez MD;  [place] [facility] [place] [facility] [place]  Service:    • COLPOSCOPY      WITH BX; RESOLVED   • DILATATION AND CURETTAGE     • ENDOSCOPY N/A 12/14/2017    Procedure: ESOPHAGOGASTRODUODENOSCOPY with biopsies;  Surgeon: Ganesh Davison MD;  Location: [facility] ENDOSCOPY;  Service:    • ENDOSCOPY AND COLONOSCOPY  2010   • GALLBLADDER SURGERY     • SEPTOPLASTY, TURBINECTOMY, ENDOSCOPIC MAXILLARY ANTROSTOMY      [facility]   • TONSILLECTOMY     • URETEROSCOPY      with cystoscopy         Current Outpatient Medications:   •  acetaminophen (TYLENOL) 500 MG tablet, Take 500 mg by mouth Every 6 (Six) Hours As Needed for Mild Pain ., Disp: " , Rfl:   •  albuterol (PROAIR HFA) 108 (90 BASE) MCG/ACT inhaler, Inhale 2 puffs every 4 (four) hours as needed for wheezing., Disp: 8 g, Rfl: 5  •  calcium carbonate EX (TUMS EX) 750 MG chewable tablet, Chew 750 mg Daily As Needed., Disp: , Rfl:   •  estradiol (VIVELLE-DOT) 0.025 MG/24HR patch, Place 1 patch on the skin 2 (Two) Times a Week., Disp: , Rfl:   •  fexofenadine (ALLEGRA ALLERGY) 180 MG tablet, Take 180 mg by mouth As Needed., Disp: , Rfl:   •  fluticasone (FLONASE) 50 MCG/ACT nasal spray, 2 sprays into each nostril Every Morning., Disp: , Rfl:   •  ibuprofen (ADVIL,MOTRIN) 200 MG tablet, Take 200 mg by mouth Every 6 (Six) Hours As Needed for Mild Pain ., Disp: , Rfl:   •  levothyroxine (SYNTHROID, LEVOTHROID) 100 MCG tablet, TAKE ONE TABLET BY MOUTH DAILY, Disp: 90 tablet, Rfl: 2  •  Misc Natural Products (OSTEO BI-FLEX TRIPLE STRENGTH) tablet, , Disp: , Rfl:   •  Probiotic Product (PROBIOTIC DAILY PO), Take 1 tablet by mouth Daily., Disp: , Rfl:   •  progesterone (PROMETRIUM) 100 MG capsule, Take 100 mg by mouth Every Night., Disp: , Rfl:   •  pseudoephedrine (SUDAFED) 120 MG 12 hr tablet, Take 120 mg by mouth Every 12 (Twelve) Hours As Needed., Disp: , Rfl:     Allergies   Allergen Reactions   • Pantoprazole Hives   • Sulfa Antibiotics Hives   • Terbinafine Hives       Social History     Socioeconomic History   • Marital status:      Spouse name: Not on file   • Number of children: 1   • Years of education: Not on file   • Highest education level: Bachelor's degree (e.g., BA, AB, BS)   Occupational History   • Occupation: HOMEMAKER   Social Needs   • Financial resource strain: Patient refused   • Food insecurity:     Worry: Patient refused     Inability: Patient refused   • Transportation needs:     Medical: Patient refused     Non-medical: Patient refused   Tobacco Use   • Smoking status: Former Smoker     Last attempt to quit: 2005     Years since quittin.7   • Smokeless tobacco:  Never Used   Substance and Sexual Activity   • Alcohol use: Yes     Comment: SOCIAL   • Drug use: No   • Sexual activity: Defer   Social History Narrative    LIVES WITH SPOUSE       Family History   Problem Relation Age of Onset   • Hodgkin's lymphoma Mother    • Migraines Mother    • Thyroid disease Mother    • Cancer Mother    • Hypertension Father    • Depression Father    • Atrial fibrillation Father    • Migraines Sister    • Hyperlipidemia Sister    • Cardiomyopathy Brother         resolved   • Lymphoma Son         Tcell   • Malig Hyperthermia Neg Hx        Review of Systems   Gastrointestinal: Negative for abdominal distention, abdominal pain, nausea, rectal pain and vomiting.   All other systems reviewed and are negative.      There were no vitals filed for this visit.    Physical Exam   Constitutional: She is oriented to person, place, and time. She appears well-developed and well-nourished.   HENT:   Head: Normocephalic and atraumatic.   Eyes: Pupils are equal, round, and reactive to light.   Cardiovascular: Normal rate, regular rhythm and normal heart sounds.   Pulmonary/Chest: Effort normal and breath sounds normal.   Abdominal: Soft. Bowel sounds are normal. She exhibits no shifting dullness, no distension, no pulsatile liver, no fluid wave, no abdominal bruit, no ascites, no pulsatile midline mass and no mass. There is no hepatosplenomegaly. There is no tenderness. There is no rigidity and no guarding. No hernia.   Musculoskeletal: Normal range of motion.   Neurological: She is alert and oriented to person, place, and time.   Skin: Skin is warm and dry.   Psychiatric: She has a normal mood and affect. Her behavior is normal. Thought content normal.   Nursing note and vitals reviewed.      Problem list    Heartburn well controlled with Tums  In need of screening colonoscopy      Assessment/Plan    Schedule screening colonoscopy with a split dose MiraLAX prep  No indication for EGD as her heartburn is  intermittent well controlled with Tums and she had a normal EGD 3 years ago  I have told her to call me immediately if she develops any vomiting or dysphagia  She should continue Tums as needed or Pepcid AC as needed

## 2020-08-11 ENCOUNTER — TRANSCRIBE ORDERS (OUTPATIENT)
Dept: SLEEP MEDICINE | Facility: HOSPITAL | Age: 55
End: 2020-08-11

## 2020-08-11 DIAGNOSIS — Z01.818 OTHER SPECIFIED PRE-OPERATIVE EXAMINATION: Primary | ICD-10-CM

## 2020-08-19 ENCOUNTER — LAB (OUTPATIENT)
Dept: LAB | Facility: HOSPITAL | Age: 55
End: 2020-08-19

## 2020-08-19 DIAGNOSIS — Z01.818 OTHER SPECIFIED PRE-OPERATIVE EXAMINATION: ICD-10-CM

## 2020-08-19 PROCEDURE — C9803 HOPD COVID-19 SPEC COLLECT: HCPCS

## 2020-08-19 PROCEDURE — U0004 COV-19 TEST NON-CDC HGH THRU: HCPCS

## 2020-08-20 LAB
REF LAB TEST METHOD: NORMAL
SARS-COV-2 RNA RESP QL NAA+PROBE: NOT DETECTED

## 2020-08-21 ENCOUNTER — ANESTHESIA (OUTPATIENT)
Dept: GASTROENTEROLOGY | Facility: HOSPITAL | Age: 55
End: 2020-08-21

## 2020-08-21 ENCOUNTER — HOSPITAL ENCOUNTER (OUTPATIENT)
Facility: HOSPITAL | Age: 55
Setting detail: HOSPITAL OUTPATIENT SURGERY
Discharge: HOME | End: 2020-08-21
Attending: INTERNAL MEDICINE | Admitting: INTERNAL MEDICINE

## 2020-08-21 ENCOUNTER — ANESTHESIA EVENT (OUTPATIENT)
Dept: GASTROENTEROLOGY | Facility: HOSPITAL | Age: 55
End: 2020-08-21

## 2020-08-21 VITALS
OXYGEN SATURATION: 99 % | SYSTOLIC BLOOD PRESSURE: 132 MMHG | DIASTOLIC BLOOD PRESSURE: 83 MMHG | HEART RATE: 68 BPM | RESPIRATION RATE: 16 BRPM

## 2020-08-21 DIAGNOSIS — Z12.11 ENCOUNTER FOR SCREENING FOR MALIGNANT NEOPLASM OF COLON: ICD-10-CM

## 2020-08-21 PROCEDURE — 25010000002 PROPOFOL 10 MG/ML EMULSION: Performed by: ANESTHESIOLOGY

## 2020-08-21 PROCEDURE — 88305 TISSUE EXAM BY PATHOLOGIST: CPT | Performed by: INTERNAL MEDICINE

## 2020-08-21 PROCEDURE — 45385 COLONOSCOPY W/LESION REMOVAL: CPT | Performed by: INTERNAL MEDICINE

## 2020-08-21 RX ORDER — PROPOFOL 10 MG/ML
VIAL (ML) INTRAVENOUS CONTINUOUS PRN
Status: DISCONTINUED | OUTPATIENT
Start: 2020-08-21 | End: 2020-08-21 | Stop reason: SURG

## 2020-08-21 RX ORDER — SODIUM CHLORIDE, SODIUM LACTATE, POTASSIUM CHLORIDE, CALCIUM CHLORIDE 600; 310; 30; 20 MG/100ML; MG/100ML; MG/100ML; MG/100ML
30 INJECTION, SOLUTION INTRAVENOUS CONTINUOUS PRN
Status: DISCONTINUED | OUTPATIENT
Start: 2020-08-21 | End: 2020-08-21 | Stop reason: HOSPADM

## 2020-08-21 RX ORDER — PROPOFOL 10 MG/ML
VIAL (ML) INTRAVENOUS AS NEEDED
Status: DISCONTINUED | OUTPATIENT
Start: 2020-08-21 | End: 2020-08-21 | Stop reason: SURG

## 2020-08-21 RX ORDER — LIDOCAINE HYDROCHLORIDE 20 MG/ML
INJECTION, SOLUTION INFILTRATION; PERINEURAL AS NEEDED
Status: DISCONTINUED | OUTPATIENT
Start: 2020-08-21 | End: 2020-08-21 | Stop reason: SURG

## 2020-08-21 RX ADMIN — LIDOCAINE HYDROCHLORIDE 40 MG: 20 INJECTION, SOLUTION INFILTRATION; PERINEURAL at 09:48

## 2020-08-21 RX ADMIN — SODIUM CHLORIDE, POTASSIUM CHLORIDE, SODIUM LACTATE AND CALCIUM CHLORIDE 30 ML/HR: 600; 310; 30; 20 INJECTION, SOLUTION INTRAVENOUS at 09:32

## 2020-08-21 RX ADMIN — PROPOFOL 140 MG: 10 INJECTION, EMULSION INTRAVENOUS at 09:49

## 2020-08-21 RX ADMIN — PROPOFOL 140 MCG/KG/MIN: 10 INJECTION, EMULSION INTRAVENOUS at 09:49

## 2020-08-21 NOTE — ANESTHESIA POSTPROCEDURE EVALUATION
Patient: Carolin Zapien    Procedure Summary     Date:  08/21/20 Room / Location:   [facility]    Anesthesia Start:  0948 Anesthesia Stop:  1020    Procedure:  COLONOSCOPY TO CECUM AND TERMINAL ILEUM WITH COLD SNARE POLYPECTOMIES (N/A ) Diagnosis:       Encounter for screening for malignant neoplasm of colon      (Encounter for screening for malignant neoplasm of colon [Z12.11])    Surgeon:  Ganesh Davison MD Provider:  Ashlee Montague MD    Anesthesia Type:  MAC ASA Status:  2          Anesthesia Type: MAC    Vitals  No vitals data found for the desired time range.          Post Anesthesia Care and Evaluation    Patient location during evaluation: bedside  Patient participation: complete - patient participated  Level of consciousness: awake  Pain management: adequate  Airway patency: patent  Anesthetic complications: No anesthetic complications    Cardiovascular status: acceptable  Respiratory status: acceptable  Hydration status: acceptable    Comments: /91 (BP Location: Left arm, Patient Position: Lying)   Pulse 67   Resp 12   LMP  (LMP Unknown)   SpO2 100%

## 2020-08-21 NOTE — DISCHARGE INSTRUCTIONS
For the next 24 hours patient needs to be with a responsible adult.    For 24 hours DO NOT drive, operate machinery, appliances, drink alcohol, make important decisions or sign legal documents.    Start with a light or bland diet and advance to regular diet as tolerated.    Follow recommendations on procedure report provided by your doctor.    Call Dr Davison for problems [phone] and for biopsy results in 7-10 days    Problems may include but not limited to: large amounts of bleeding, trouble breathing, repeated vomiting, severe unrelieved pain, fever or chills.

## 2020-08-21 NOTE — ANESTHESIA PREPROCEDURE EVALUATION
Anesthesia Evaluation                  Airway   Mallampati: II  TM distance: >3 FB  Neck ROM: full  Dental      Comment: Front upper teeth bridge    Pulmonary    (+) a smoker Former, asthma,  (-) shortness of breath, recent URI  Cardiovascular     (-) hypertension, dysrhythmias, angina, hyperlipidemia    ROS comment: Atypical chest pain    Neuro/Psych  (-) dizziness/light headedness  GI/Hepatic/Renal/Endo    (+)  GERD,  renal disease stones, thyroid problem     Musculoskeletal     Abdominal    Substance History      OB/GYN          Other                      Anesthesia Plan    ASA 2     MAC     intravenous induction     Anesthetic plan, all risks, benefits, and alternatives have been provided, discussed and informed consent has been obtained with: patient.

## 2020-08-24 LAB
CYTO UR: NORMAL
LAB AP CASE REPORT: NORMAL
PATH REPORT.FINAL DX SPEC: NORMAL
PATH REPORT.GROSS SPEC: NORMAL

## 2020-08-28 ENCOUNTER — TELEPHONE (OUTPATIENT)
Dept: FAMILY MEDICINE CLINIC | Facility: CLINIC | Age: 55
End: 2020-08-28

## 2020-08-28 ENCOUNTER — TELEPHONE (OUTPATIENT)
Dept: GASTROENTEROLOGY | Facility: CLINIC | Age: 55
End: 2020-08-28

## 2020-08-28 NOTE — TELEPHONE ENCOUNTER
[facility] INSURANCE           PATIENT LABS-NEED PREVENTATIVE SCREENING CODE ON LABS FOR 7/31/20 TO BE COVERED 100%    SEND CORRECTED ORDERS TO [facility] PLEASE

## 2020-08-28 NOTE — TELEPHONE ENCOUNTER
Pt called and left a voicemail message on the RN voicemail. Requested path results form c/s last Friday. CB is [phone].

## 2020-09-21 DIAGNOSIS — E03.9 ADULT HYPOTHYROIDISM: ICD-10-CM

## 2020-09-22 RX ORDER — LEVOTHYROXINE SODIUM 0.1 MG/1
TABLET ORAL
Qty: 90 TABLET | Refills: 1 | Status: SHIPPED | OUTPATIENT
Start: 2020-09-22 | End: 2021-03-26

## 2020-10-07 ENCOUNTER — FLU SHOT (OUTPATIENT)
Dept: FAMILY MEDICINE CLINIC | Facility: CLINIC | Age: 55
End: 2020-10-07

## 2020-10-07 DIAGNOSIS — Z23 NEED FOR INFLUENZA VACCINATION: ICD-10-CM

## 2020-10-07 PROCEDURE — 90686 IIV4 VACC NO PRSV 0.5 ML IM: CPT | Performed by: FAMILY MEDICINE

## 2020-10-07 PROCEDURE — 90471 IMMUNIZATION ADMIN: CPT | Performed by: FAMILY MEDICINE

## 2020-10-13 DIAGNOSIS — M25.559 HIP PAIN: Primary | ICD-10-CM

## 2020-10-21 ENCOUNTER — TELEPHONE (OUTPATIENT)
Dept: ORTHOPEDIC SURGERY | Facility: CLINIC | Age: 55
End: 2020-10-21

## 2020-10-21 NOTE — TELEPHONE ENCOUNTER
CALLED PT AT HOME AND LEFT MSG TO CALL ME ABOUT POSSIBLY MOVING HER APPT TO SEE PATRIA BURGOS SO SHE CAN BE SEEN EARLIER/DM-----PT CALLED ME BACK AND SAID SHE WOULD RATHER KEEP HER APPT WITH DR PATRICK/DM

## 2020-12-03 ENCOUNTER — OFFICE VISIT (OUTPATIENT)
Dept: ORTHOPEDIC SURGERY | Facility: CLINIC | Age: 55
End: 2020-12-03

## 2020-12-03 VITALS — HEIGHT: 61 IN | WEIGHT: 139.99 LBS | BODY MASS INDEX: 26.43 KG/M2 | TEMPERATURE: 98 F

## 2020-12-03 DIAGNOSIS — M70.62 TROCHANTERIC BURSITIS OF LEFT HIP: ICD-10-CM

## 2020-12-03 DIAGNOSIS — R52 PAIN: Primary | ICD-10-CM

## 2020-12-03 PROCEDURE — 73501 X-RAY EXAM HIP UNI 1 VIEW: CPT | Performed by: ORTHOPAEDIC SURGERY

## 2020-12-03 PROCEDURE — 99202 OFFICE O/P NEW SF 15 MIN: CPT | Performed by: ORTHOPAEDIC SURGERY

## 2020-12-03 RX ORDER — AZELASTINE 1 MG/ML
SPRAY, METERED NASAL
COMMUNITY
Start: 2020-09-21

## 2020-12-03 NOTE — PROGRESS NOTES
Patient: Carolin Zapien  YOB: 1965 55 y.o. female  Medical Record Number: [id number]    Chief Complaints:   Chief Complaint   Patient presents with   • Left Hip - Pain, Initial Evaluation       History of Present Illness:Carolin Zapien is a 55 y.o. female who presents with left lateral hip pain about a year.  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  She developed lateral hip pain worse when she lays on her side which she describes as a moderate to at times severe ache which wakes her up regularly.  She has stopped her work which requires prolonged sitting and she feels that it has improved somewhat she currently rates it as moderate in nature.    Allergies:   Allergies   Allergen Reactions   • Pantoprazole Hives   • Sulfa Antibiotics Hives   • Terbinafine Hives       Medications:   Current Outpatient Medications   Medication Sig Dispense Refill   • acetaminophen (TYLENOL) 500 MG tablet Take 500 mg by mouth Every 6 (Six) Hours As Needed for Mild Pain .     • albuterol (PROAIR HFA) 108 (90 BASE) MCG/ACT inhaler Inhale 2 puffs every 4 (four) hours as needed for wheezing. 8 g 5   • azelastine (ASTELIN) 0.1 % nasal spray      • calcium carbonate EX (TUMS EX) 750 MG chewable tablet Chew 750 mg Daily As Needed.     • estradiol (VIVELLE-DOT) 0.025 MG/24HR patch Place 1 patch on the skin 2 (Two) Times a Week.     • fexofenadine (ALLEGRA ALLERGY) 180 MG tablet Take 180 mg by mouth As Needed.     • fluticasone (FLONASE) 50 MCG/ACT nasal spray 2 sprays into each nostril Every Morning.     • ibuprofen (ADVIL,MOTRIN) 200 MG tablet Take 200 mg by mouth Every 6 (Six) Hours As Needed for Mild Pain .     • levothyroxine (SYNTHROID, LEVOTHROID) 100 MCG tablet TAKE ONE TABLET BY MOUTH DAILY 90 tablet 1   • Misc Natural Products (OSTEO BI-FLEX TRIPLE STRENGTH) tablet      • Probiotic Product (PROBIOTIC DAILY PO) Take 1 tablet by mouth Daily.     • progesterone  "(PROMETRIUM) 100 MG capsule Take 100 mg by mouth Every Night.     • pseudoephedrine (SUDAFED) 120 MG 12 hr tablet Take 120 mg by mouth Every 12 (Twelve) Hours As Needed.       No current facility-administered medications for this visit.          The following portions of the patient's history were reviewed and updated as appropriate: allergies, current medications, past family history, past medical history, past social history, past surgical history and problem list.    Review of Systems:   A 14 point review of systems was performed. All systems negative except pertinent positives/negative listed in HPI above    Physical Exam:   Vitals:    12/03/20 1434   Temp: 98 °F (36.7 °C)   TempSrc: Temporal   Weight: 63.5 kg (139 lb 15.9 oz)   Height: 154.9 cm (60.98\")   PainSc:   1   PainLoc: Hip       General: A and O x 3, ASA, NAD    SCLERA:    Normal    DENTITION:   Normal  Hip:  left    LEG ALIGNMENT:     Neutral   ,    equal leg lengths    GAIT:     Nonantalgic    SKIN:     No abnormality    RANGE OF MOTION:      Full without joint irritability    STRENGTH:     5 / 5    hip flexion and abduction    DISTAL PULSES:    Paplable    DISTAL SENSATION :   Intact    LYMPHATICS:     No   lymphadenopathy    OTHER:          - Negative Stinchfeld test      - Negative log roll      +Tenderness to palpation trochanteric bursa          Radiology:  Xrays 2views (AP bilateral hips, and lateral of the hip) ordered and reviewed for evaluation of hip pain  demonstrating no significant arthritic change.  There is some mild spurring about the lateral aspect of the left greater trochanter.  There are no previous films for comparison.    Assessment/Plan: Left hip trochanteric bursitis I have instructed her on a series of stretching exercises.  She will give it 6 to 8 weeks and if not improved she will call back to set up a hip bursa injection.      Matt Leonardo MD  12/3/2020  "

## 2021-03-26 ENCOUNTER — BULK ORDERING (OUTPATIENT)
Dept: CASE MANAGEMENT | Facility: OTHER | Age: 56
End: 2021-03-26

## 2021-03-26 DIAGNOSIS — Z23 IMMUNIZATION DUE: ICD-10-CM

## 2021-03-26 DIAGNOSIS — E03.9 ADULT HYPOTHYROIDISM: ICD-10-CM

## 2021-03-26 RX ORDER — LEVOTHYROXINE SODIUM 0.1 MG/1
TABLET ORAL
Qty: 90 TABLET | Refills: 0 | Status: SHIPPED | OUTPATIENT
Start: 2021-03-26 | End: 2021-07-14

## 2021-07-14 DIAGNOSIS — E03.9 ADULT HYPOTHYROIDISM: ICD-10-CM

## 2021-07-14 RX ORDER — LEVOTHYROXINE SODIUM 0.1 MG/1
TABLET ORAL
Qty: 90 TABLET | Refills: 0 | Status: SHIPPED | OUTPATIENT
Start: 2021-07-14 | End: 2021-10-06

## 2021-07-15 ENCOUNTER — APPOINTMENT (OUTPATIENT)
Dept: WOMENS IMAGING | Facility: HOSPITAL | Age: 56
End: 2021-07-15

## 2021-07-15 PROCEDURE — 77063 BREAST TOMOSYNTHESIS BI: CPT | Performed by: RADIOLOGY

## 2021-07-15 PROCEDURE — 77067 SCR MAMMO BI INCL CAD: CPT | Performed by: RADIOLOGY

## 2021-10-06 DIAGNOSIS — E03.9 ADULT HYPOTHYROIDISM: ICD-10-CM

## 2021-10-06 RX ORDER — LEVOTHYROXINE SODIUM 0.1 MG/1
TABLET ORAL
Qty: 30 TABLET | Refills: 0 | Status: SHIPPED | OUTPATIENT
Start: 2021-10-06 | End: 2021-10-14 | Stop reason: SDUPTHER

## 2021-10-07 ENCOUNTER — TELEPHONE (OUTPATIENT)
Dept: FAMILY MEDICINE CLINIC | Facility: CLINIC | Age: 56
End: 2021-10-07

## 2021-10-07 NOTE — TELEPHONE ENCOUNTER
PATIENT CALLED TO CHECK ON WHY IS FOR HER NEEDED TO BE SEEN IN ORDER TO GET MORE REFILLS OF LEVOTHYROXINE. PATIENT WAS ONLINE TRYING TO GET AN APPT HERSELF FOR A PHYSICAL AND WAS NOT ABLE TO FIND ONE UNTIL January,   PATIENT HAS A HIGH DEDUCTIBLE PLAN IF THE VISIT IS NOT BILLED AS A PHYSICAL. WAS ASKING FOR THE 10/13 VISIT TO BE BILLED OR SET AS A PHYSICAL SINCE THE DOCTOR IS THE ONE WHO IS INSISTING IN SEEING HER WITHIN 30 DAYS, WHEN THE DOCTOR DOES NOT HAVE ANY PHYSICAL APPTS HERSELF    PLEASE ADVISE    [phone]

## 2021-10-13 ENCOUNTER — OFFICE VISIT (OUTPATIENT)
Dept: FAMILY MEDICINE CLINIC | Facility: CLINIC | Age: 56
End: 2021-10-13

## 2021-10-13 VITALS
BODY MASS INDEX: 25.95 KG/M2 | OXYGEN SATURATION: 99 % | SYSTOLIC BLOOD PRESSURE: 128 MMHG | RESPIRATION RATE: 16 BRPM | HEART RATE: 59 BPM | WEIGHT: 141 LBS | DIASTOLIC BLOOD PRESSURE: 72 MMHG | HEIGHT: 62 IN

## 2021-10-13 DIAGNOSIS — Z00.00 HEALTHCARE MAINTENANCE: Primary | ICD-10-CM

## 2021-10-13 PROBLEM — Z12.11 ENCOUNTER FOR SCREENING FOR MALIGNANT NEOPLASM OF COLON: Status: RESOLVED | Noted: 2020-08-10 | Resolved: 2021-10-13

## 2021-10-13 LAB
ERYTHROCYTE [DISTWIDTH] IN BLOOD BY AUTOMATED COUNT: 12.2 % (ref 12.3–15.4)
HCT VFR BLD AUTO: 43 % (ref 34–46.6)
HGB BLD-MCNC: 14.4 G/DL (ref 12–15.9)
MCH RBC QN AUTO: 31.4 PG (ref 26.6–33)
MCHC RBC AUTO-ENTMCNC: 33.5 G/DL (ref 31.5–35.7)
MCV RBC AUTO: 93.7 FL (ref 79–97)
PLATELET # BLD AUTO: 246 10*3/MM3 (ref 140–450)
RBC # BLD AUTO: 4.59 10*6/MM3 (ref 3.77–5.28)
WBC # BLD AUTO: 6.63 10*3/MM3 (ref 3.4–10.8)

## 2021-10-13 PROCEDURE — 99396 PREV VISIT EST AGE 40-64: CPT | Performed by: FAMILY MEDICINE

## 2021-10-13 NOTE — PROGRESS NOTES
Assessment and Plan     Problem List Items Addressed This Visit     None      Visit Diagnoses     Healthcare maintenance    -  Primary    Relevant Orders    CBC (No Diff)    Comprehensive Metabolic Panel    Lipid Panel With LDL / HDL Ratio    TSH        Return in about 1 year (around 10/13/2022).    Patient was given instructions and counseling regarding her condition or for health maintenance advice. Please see specific information pulled into the AVS if appropriate.        Carolin is a 56 y.o. being seen today for  Annual Exam   Subjective   History of the Present Illness   Annual Exam: Patient presents for annual exam.  findings; last pap: approximate date 2020 and was normal  Last mammo: approximate date 7/2021 and was normal   The patient is not  still having menses.  Last colonoscopy: colonoscopy 3 years ago with abnormalities -- three polyps.    The patient wears seatbelts: yes.  Practicing social distancing, handwashing and keeping hands from face? yes  She is eating a healthy diet.   The patient regularly exercises: yes. This patient has ever been tested for HepC: yes    She does see a dentist regularly.   Is she using sunscreen: yes  Her immunization are up-to-date.    Social History  She  reports that she quit smoking about 15 years ago. She started smoking about 37 years ago. She has a 20.00 pack-year smoking history. She has never used smokeless tobacco. She reports current alcohol use of about 1.0 standard drink of alcohol per week. She reports that she does not use drugs.  Objective   Vital Signs        BP Readings from Last 1 Encounters:   10/13/21 128/72     Wt Readings from Last 3 Encounters:   10/13/21 64 kg (141 lb)   12/03/20 63.5 kg (139 lb 15.9 oz)   08/10/20 63.5 kg (140 lb 1.6 oz)   Body mass index is 26.21 kg/m².     Physical Exam  Vitals reviewed.   Constitutional:       General: She is not in acute distress.     Appearance: She is well-developed.   HENT:      Head: Normocephalic and  atraumatic.      Right Ear: Tympanic membrane, ear canal and external ear normal.      Left Ear: Tympanic membrane, ear canal and external ear normal.   Eyes:      Conjunctiva/sclera: Conjunctivae normal.   Neck:      Thyroid: No thyromegaly.      Trachea: No tracheal deviation.   Cardiovascular:      Rate and Rhythm: Normal rate and regular rhythm.      Heart sounds: Normal heart sounds.   Pulmonary:      Effort: Pulmonary effort is normal. No respiratory distress.      Breath sounds: Normal breath sounds. No wheezing or rales.   Abdominal:      General: Bowel sounds are normal. There is no distension.      Palpations: Abdomen is soft.      Tenderness: There is no abdominal tenderness.   Musculoskeletal:         General: No deformity.      Cervical back: Normal range of motion and neck supple.   Lymphadenopathy:      Cervical: No cervical adenopathy.   Skin:     General: Skin is warm and dry.   Neurological:      Mental Status: She is alert and oriented to person, place, and time.   Psychiatric:         Behavior: Behavior normal.         Thought Content: Thought content normal.         Judgment: Judgment normal.

## 2021-10-14 DIAGNOSIS — E03.9 ADULT HYPOTHYROIDISM: ICD-10-CM

## 2021-10-14 LAB
ALBUMIN SERPL-MCNC: 4.6 G/DL (ref 3.5–5.2)
ALBUMIN/GLOB SERPL: 2.1 G/DL
ALP SERPL-CCNC: 74 U/L (ref 39–117)
ALT SERPL-CCNC: 16 U/L (ref 1–33)
AST SERPL-CCNC: 19 U/L (ref 1–32)
BILIRUB SERPL-MCNC: 0.3 MG/DL (ref 0–1.2)
BUN SERPL-MCNC: 9 MG/DL (ref 6–20)
BUN/CREAT SERPL: 13 (ref 7–25)
CALCIUM SERPL-MCNC: 9.4 MG/DL (ref 8.6–10.5)
CHLORIDE SERPL-SCNC: 104 MMOL/L (ref 98–107)
CHOLEST SERPL-MCNC: 219 MG/DL (ref 0–200)
CO2 SERPL-SCNC: 26.5 MMOL/L (ref 22–29)
CREAT SERPL-MCNC: 0.69 MG/DL (ref 0.57–1)
GLOBULIN SER CALC-MCNC: 2.2 GM/DL
GLUCOSE SERPL-MCNC: 83 MG/DL (ref 65–99)
HDLC SERPL-MCNC: 57 MG/DL (ref 40–60)
LDLC SERPL CALC-MCNC: 140 MG/DL (ref 0–100)
LDLC/HDLC SERPL: 2.41 {RATIO}
POTASSIUM SERPL-SCNC: 4.4 MMOL/L (ref 3.5–5.2)
PROT SERPL-MCNC: 6.8 G/DL (ref 6–8.5)
SODIUM SERPL-SCNC: 138 MMOL/L (ref 136–145)
TRIGL SERPL-MCNC: 124 MG/DL (ref 0–150)
TSH SERPL DL<=0.005 MIU/L-ACNC: 1.16 UIU/ML (ref 0.27–4.2)
VLDLC SERPL CALC-MCNC: 22 MG/DL (ref 5–40)

## 2021-10-14 RX ORDER — LEVOTHYROXINE SODIUM 0.1 MG/1
100 TABLET ORAL DAILY
Qty: 90 TABLET | Refills: 3 | Status: SHIPPED | OUTPATIENT
Start: 2021-10-14 | End: 2022-10-25

## 2022-05-10 ENCOUNTER — CLINICAL SUPPORT (OUTPATIENT)
Dept: FAMILY MEDICINE CLINIC | Facility: CLINIC | Age: 57
End: 2022-05-10

## 2022-07-21 ENCOUNTER — APPOINTMENT (OUTPATIENT)
Dept: WOMENS IMAGING | Facility: HOSPITAL | Age: 57
End: 2022-07-21

## 2022-07-21 PROCEDURE — 77063 BREAST TOMOSYNTHESIS BI: CPT | Performed by: RADIOLOGY

## 2022-07-21 PROCEDURE — 77067 SCR MAMMO BI INCL CAD: CPT | Performed by: RADIOLOGY

## 2022-10-24 ENCOUNTER — OFFICE VISIT (OUTPATIENT)
Dept: ORTHOPEDIC SURGERY | Facility: CLINIC | Age: 57
End: 2022-10-24

## 2022-10-24 VITALS — TEMPERATURE: 98 F | WEIGHT: 140 LBS | BODY MASS INDEX: 26.43 KG/M2 | HEIGHT: 61 IN

## 2022-10-24 DIAGNOSIS — R52 PAIN: ICD-10-CM

## 2022-10-24 DIAGNOSIS — M75.41 IMPINGEMENT SYNDROME OF RIGHT SHOULDER: ICD-10-CM

## 2022-10-24 PROCEDURE — 73030 X-RAY EXAM OF SHOULDER: CPT | Performed by: ORTHOPAEDIC SURGERY

## 2022-10-24 PROCEDURE — 99214 OFFICE O/P EST MOD 30 MIN: CPT | Performed by: ORTHOPAEDIC SURGERY

## 2022-10-24 RX ORDER — NAPROXEN 500 MG/1
500 TABLET ORAL 2 TIMES DAILY
Qty: 60 TABLET | Refills: 0 | Status: SHIPPED | OUTPATIENT
Start: 2022-10-24 | End: 2023-03-22

## 2022-10-24 NOTE — PROGRESS NOTES
New Shoulder      Patient: Carolin Zapien        YOB: 1965    Medical Record Number: [id number]        Chief Complaints: Right shoulder pain    History of Present Illness: This is a 57-year-old female who is right-hand dominant presents with right shoulder pain is been ongoing for 2 months mostly with range of motion and night pain.  She is playing pickle ball and is not sure if that is the source she did start with some tennis elbow but that has resolved no definitive history injury change in activity no history of similar symptoms symptoms are moderate intermittent aching worse with activity somewhat better with rest past medical history is marked for migraines reflux allergies thyroid thyroid disease diverticulitis    Allergies:   Allergies   Allergen Reactions   • Pantoprazole Hives   • Sulfa Antibiotics Hives   • Terbinafine Hives       Medications:   Home Medications:  Current Outpatient Medications on File Prior to Visit   Medication Sig   • acetaminophen (TYLENOL) 500 MG tablet Take 500 mg by mouth Every 6 (Six) Hours As Needed for Mild Pain .   • albuterol (PROAIR HFA) 108 (90 BASE) MCG/ACT inhaler Inhale 2 puffs every 4 (four) hours as needed for wheezing.   • azelastine (ASTELIN) 0.1 % nasal spray    • calcium carbonate EX (TUMS EX) 750 MG chewable tablet Chew 750 mg Daily As Needed.   • estradiol (VIVELLE-DOT) 0.025 MG/24HR patch Place 1 patch on the skin 2 (Two) Times a Week.   • fexofenadine (ALLEGRA) 180 MG tablet Take 180 mg by mouth As Needed.   • fluticasone (FLONASE) 50 MCG/ACT nasal spray 2 sprays into each nostril Every Morning.   • ibuprofen (ADVIL,MOTRIN) 200 MG tablet Take 200 mg by mouth Every 6 (Six) Hours As Needed for Mild Pain .   • levothyroxine (SYNTHROID, LEVOTHROID) 100 MCG tablet Take 1 tablet by mouth Daily.   • Misc Natural Products (OSTEO BI-FLEX TRIPLE STRENGTH) tablet    • Probiotic Product (PROBIOTIC DAILY PO) Take 1 tablet by mouth Daily.   • progesterone  "(PROMETRIUM) 100 MG capsule Take 100 mg by mouth Every Night.   • pseudoephedrine (SUDAFED) 120 MG 12 hr tablet Take 120 mg by mouth Every 12 (Twelve) Hours As Needed.     No current facility-administered medications on file prior to visit.     Current Medications:  Scheduled Meds:  Continuous Infusions:No current facility-administered medications for this visit.    PRN Meds:.    Past Medical History:   Diagnosis Date   • Asthma     \"MILD\"   USES INHALER MAYBE TWICE A YEAR   • Atypical chest pain 04/14/2014    RESOLVED; 07/20/2015   • Cataract     maria l premature   • DDD (degenerative disc disease), cervical    • DDD (degenerative disc disease), cervical    • Degeneration of cervical intervertebral disc    • Diverticulitis    • Fatigue    • GERD (gastroesophageal reflux disease)    • Kidney cyst, acquired 10/24/2015   • Kidney stone    • Migraines    • Multiple joint pain 07/18/2013   • Non-functioning gallbladder    • Raynaud's disease    • Seasonal allergies    • Seborrheic dermatitis    • Thyroid disease         Past Surgical History:   Procedure Laterality Date   • CHOLECYSTECTOMY N/A 12/29/2017    Procedure: CHOLECYSTECTOMY LAPAROSCOPIC;  Surgeon: Mc Gonzalez MD;  Location: [facility];  Service:    • COLONOSCOPY N/A 8/21/2020    Procedure: COLONOSCOPY TO CECUM AND TERMINAL ILEUM WITH COLD SNARE POLYPECTOMIES;  Surgeon: Ganesh Davison MD;  Location: [facility] ENDOSCOPY;  Service: Gastroenterology;  Laterality: N/A;  PRE- SCREENING  POST- NORMAL TI, COLON POLYPS, DIVERTICULOSIS, INTERNAL HEMORRHOIDS   • COLPOSCOPY      WITH BX; RESOLVED   • DILATATION AND CURETTAGE     • ENDOSCOPY N/A 12/14/2017    Procedure: ESOPHAGOGASTRODUODENOSCOPY with biopsies;  Surgeon: Ganesh Davison MD;  Location: [facility] ENDOSCOPY;  Service:    • ENDOSCOPY AND COLONOSCOPY  2010   • GALLBLADDER SURGERY     • SEPTOPLASTY, TURBINECTOMY, ENDOSCOPIC MAXILLARY ANTROSTOMY      [facility]   • TONSILLECTOMY     • URETEROSCOPY      with " "cystoscopy        Social History     Occupational History   • Occupation: HOMEMAKER   Tobacco Use   • Smoking status: Former     Packs/day: 1.00     Years: 20.00     Pack years: 20.00     Types: Cigarettes     Start date: 10/1/1984     Quit date: 2005     Years since quittin.9   • Smokeless tobacco: Never   Substance and Sexual Activity   • Alcohol use: Yes     Alcohol/week: 1.0 standard drink     Types: 1 Cans of beer per week     Comment: social drinker   • Drug use: No   • Sexual activity: Yes     Partners: Male     Birth control/protection: Post-menopausal      Social History     Social History Narrative    LIVES WITH SPOUSE        Family History   Problem Relation Age of Onset   • Hodgkin's lymphoma Mother    • Migraines Mother    • Thyroid disease Mother    • Cancer Mother    • Hypertension Father    • Depression Father    • Atrial fibrillation Father    • Migraines Sister    • Hyperlipidemia Sister    • Cardiomyopathy Brother         resolved   • Lymphoma Son         Tcell   • Malig Hyperthermia Neg Hx              Review of Systems:     Review of Systems      Physical Exam: 57 y.o. female  General Appearance:    Alert, cooperative, in no acute distress                   Vitals:    10/24/22 0913   Temp: 98 °F (36.7 °C)   TempSrc: Temporal   Weight: 63.5 kg (140 lb)   Height: 154.9 cm (61\")      Patient is alert and read ×3 no acute distress appears her above-listed at height weight and age.  Affect is normal respiratory rate is normal unlabored. Heart rate regular rate rhythm, sclera, dentition and hearing are normal for the purpose of this exam.    Ortho Exam Physical exam of the right shoulder reveals no overlying skin changes no lymphedema no lymphadenopathy.  Patient has active flexion 180 with mild symptoms abduction is similar external rotation is to 50 and internal rotation to the upper lumbar spine with mild symptoms.  Patient has good rotator cuff strength 4+ over 5 with isometric strength " testing with pain.  Patient has a positive impingement and a positive Khalil sign.  Patient has good cervical range of motion which is full and asymptomatic no radicular symptoms.  Patient has a normal elbow exam.  Good distal pulses are present  Patient has pain with overhead activity and a positive Neer sign and a positive empty can sign , a positive drop arm and a definitive painful arc  Large Joint Arthrocentesis: R subacromial bursa  Date/Time: 10/27/2022 1:28 PM  Consent given by: patient  Site marked: site marked  Timeout: Immediately prior to procedure a time out was called to verify the correct patient, procedure, equipment, support staff and site/side marked as required   Supporting Documentation  Indications: pain   Procedure Details  Location: shoulder - R subacromial bursa  Preparation: Patient was prepped and draped in the usual sterile fashion  Needle gauge: 21G.  Approach: posterior  Medications administered: 80 mg methylPREDNISolone acetate 80 MG/ML; 2 mL lidocaine (cardiac)  Patient tolerance: patient tolerated the procedure well with no immediate complications                Radiology:   AP, Scapular Y and Axillary Lateral of the right shoulder were ordered/reviewed to evauate shoulder pain.  I have no comparative films these are normal I see no evidence of any acute bony pathology  Imaging Results (Most Recent)     Procedure Component Value Units Date/Time    XR Shoulder 2+ View Right [[id number]] Resulted: 10/24/22 0914     Updated: 10/24/22 0914    Impression:      Ordering physician's impression is located in the Encounter Note dated 10/24/22. X-ray performed in the DR room.          Assessment/Plan: Right shoulder pain I suspect this is rotator cuff I suspect is coming from probably from the pickleball plan is to proceed with a subacromial space as diagnostic and therapeutic tool.  I will start her on some naproxen with strict precautions for short period of time and I will have her see  physical therapy should she fail to improve with this regimen would consider other means of testing  Cortisone Injection. See procedure note.  Cortisone Injection for DIAGNOSTIC and THERAPUTIC purposes.

## 2022-10-25 DIAGNOSIS — E03.9 ADULT HYPOTHYROIDISM: ICD-10-CM

## 2022-10-25 RX ORDER — LEVOTHYROXINE SODIUM 0.1 MG/1
TABLET ORAL
Qty: 90 TABLET | Refills: 3 | Status: SHIPPED | OUTPATIENT
Start: 2022-10-25

## 2022-10-27 PROCEDURE — 20610 DRAIN/INJ JOINT/BURSA W/O US: CPT | Performed by: ORTHOPAEDIC SURGERY

## 2022-10-27 RX ORDER — METHYLPREDNISOLONE ACETATE 80 MG/ML
80 INJECTION, SUSPENSION INTRA-ARTICULAR; INTRALESIONAL; INTRAMUSCULAR; SOFT TISSUE
Status: COMPLETED | OUTPATIENT
Start: 2022-10-27 | End: 2022-10-27

## 2022-10-27 RX ADMIN — METHYLPREDNISOLONE ACETATE 80 MG: 80 INJECTION, SUSPENSION INTRA-ARTICULAR; INTRALESIONAL; INTRAMUSCULAR; SOFT TISSUE at 13:28

## 2022-11-10 NOTE — PROGRESS NOTES
Bp check   negative Affect and characteristics of appearance, verbalizations, behaviors are appropriate

## 2022-11-10 NOTE — PROGRESS NOTES
Assessment and Plan     Patient Instructions    Problem List Items Addressed This Visit        Endocrine and Metabolic    Adult hypothyroidism    Overview     [place] 8/3/2020  Labs are good from last week.        Other Visit Diagnoses     Healthcare maintenance    -  Primary    Relevant Orders    CBC (No Diff)    Comprehensive Metabolic Panel    Lipid Panel With LDL / HDL Ratio    TSH    Need for vaccination        Relevant Orders    FluLaval/Fluzone >6 mos ([id number])    Multiple joint pain        Relevant Orders    Sedimentation Rate                     Carolin is a 57 y.o. being seen today for  Annual Exam   Subjective   History of the Present Illness   Annual Exam-Postmenopausal:     Carolin Zapien 57 y.o.female presents for annual exam.     Last pap: approximate date 2020 and was normal  Last mammogramapproximate date 7/2022 and was normal  The patient is taking hormone replacement therapy.   The patient wears seatbelts: yes.    The patient has regular exercise: yes.     Exercise: 7 times/week.  This patient has ever been tested for HepC: yes .   Dental Exam. up to date  history; colonoscopy/sigmoidoscopy: colonoscopy 3 years ago with abnormalities.  Her immunization are up-to-date.  She is eating a healthy diet.   Labs results were ordered    Social History  She  reports that she quit smoking about 17 years ago. Her smoking use included cigarettes. She started smoking about 38 years ago. She has a 20.00 pack-year smoking history. She has never used smokeless tobacco. She reports current alcohol use of about 1.0 standard drink per week. She reports that she does not use drugs.  Objective   Vital Signs        BP Readings from Last 1 Encounters:   11/11/22 120/62     Wt Readings from Last 3 Encounters:   11/11/22 62.6 kg (138 lb)   10/24/22 63.5 kg (140 lb)   10/13/21 64 kg (141 lb)   Body mass index is 26.07 kg/m².     Physical Exam  Vitals reviewed.   Constitutional:       General: She is not in acute  distress.     Appearance: Normal appearance. She is well-developed and normal weight.   HENT:      Head: Normocephalic and atraumatic.      Right Ear: Tympanic membrane, ear canal and external ear normal.      Left Ear: Tympanic membrane, ear canal and external ear normal.      Mouth/Throat:      Comments: Mask in place  Eyes:      Conjunctiva/sclera: Conjunctivae normal.   Neck:      Thyroid: No thyromegaly.      Trachea: No tracheal deviation.   Cardiovascular:      Rate and Rhythm: Normal rate and regular rhythm.      Heart sounds: Normal heart sounds.   Pulmonary:      Effort: Pulmonary effort is normal. No respiratory distress.      Breath sounds: Normal breath sounds. No wheezing or rales.   Chest:   Breasts:     Breasts are symmetrical.      Right: No mass.      Left: No mass.   Abdominal:      General: Bowel sounds are normal. There is no distension.      Palpations: Abdomen is soft.      Tenderness: There is no abdominal tenderness.   Musculoskeletal:         General: No deformity.      Cervical back: Normal range of motion and neck supple.      Right lower leg: No edema.      Left lower leg: No edema.   Lymphadenopathy:      Cervical: No cervical adenopathy.   Skin:     General: Skin is warm and dry.   Neurological:      Mental Status: She is alert and oriented to person, place, and time.   Psychiatric:         Behavior: Behavior normal.         Thought Content: Thought content normal.         Judgment: Judgment normal.               Patient was given instructions and counseling regarding her condition or for health maintenance advice. Please see specific information pulled into the AVS if appropriate.

## 2022-11-11 ENCOUNTER — OFFICE VISIT (OUTPATIENT)
Dept: FAMILY MEDICINE CLINIC | Facility: CLINIC | Age: 57
End: 2022-11-11

## 2022-11-11 VITALS
OXYGEN SATURATION: 100 % | DIASTOLIC BLOOD PRESSURE: 62 MMHG | HEART RATE: 56 BPM | RESPIRATION RATE: 18 BRPM | WEIGHT: 138 LBS | BODY MASS INDEX: 26.06 KG/M2 | SYSTOLIC BLOOD PRESSURE: 120 MMHG | HEIGHT: 61 IN

## 2022-11-11 DIAGNOSIS — Z23 NEED FOR VACCINATION: ICD-10-CM

## 2022-11-11 DIAGNOSIS — E03.9 ADULT HYPOTHYROIDISM: ICD-10-CM

## 2022-11-11 DIAGNOSIS — Z00.00 HEALTHCARE MAINTENANCE: Primary | ICD-10-CM

## 2022-11-11 DIAGNOSIS — M25.50 MULTIPLE JOINT PAIN: ICD-10-CM

## 2022-11-11 PROCEDURE — 90686 IIV4 VACC NO PRSV 0.5 ML IM: CPT | Performed by: FAMILY MEDICINE

## 2022-11-11 PROCEDURE — 99396 PREV VISIT EST AGE 40-64: CPT | Performed by: FAMILY MEDICINE

## 2022-11-11 PROCEDURE — 90471 IMMUNIZATION ADMIN: CPT | Performed by: FAMILY MEDICINE

## 2022-11-12 LAB
ALBUMIN SERPL-MCNC: 4.6 G/DL (ref 3.5–5.2)
ALBUMIN/GLOB SERPL: 2.6 G/DL
ALP SERPL-CCNC: 71 U/L (ref 39–117)
ALT SERPL-CCNC: 17 U/L (ref 1–33)
AST SERPL-CCNC: 20 U/L (ref 1–32)
BILIRUB SERPL-MCNC: 0.3 MG/DL (ref 0–1.2)
BUN SERPL-MCNC: 9 MG/DL (ref 6–20)
BUN/CREAT SERPL: 14.3 (ref 7–25)
CALCIUM SERPL-MCNC: 9.3 MG/DL (ref 8.6–10.5)
CHLORIDE SERPL-SCNC: 104 MMOL/L (ref 98–107)
CHOLEST SERPL-MCNC: 222 MG/DL (ref 0–200)
CO2 SERPL-SCNC: 26.7 MMOL/L (ref 22–29)
CREAT SERPL-MCNC: 0.63 MG/DL (ref 0.57–1)
EGFRCR SERPLBLD CKD-EPI 2021: 104.3 ML/MIN/1.73
ERYTHROCYTE [DISTWIDTH] IN BLOOD BY AUTOMATED COUNT: 11.9 % (ref 12.3–15.4)
ERYTHROCYTE [SEDIMENTATION RATE] IN BLOOD BY WESTERGREN METHOD: 5 MM/HR (ref 0–30)
GLOBULIN SER CALC-MCNC: 1.8 GM/DL
GLUCOSE SERPL-MCNC: 83 MG/DL (ref 65–99)
HCT VFR BLD AUTO: 40.3 % (ref 34–46.6)
HDLC SERPL-MCNC: 65 MG/DL (ref 40–60)
HGB BLD-MCNC: 13.6 G/DL (ref 12–15.9)
LDLC SERPL CALC-MCNC: 140 MG/DL (ref 0–100)
LDLC/HDLC SERPL: 2.12 {RATIO}
MCH RBC QN AUTO: 31 PG (ref 26.6–33)
MCHC RBC AUTO-ENTMCNC: 33.7 G/DL (ref 31.5–35.7)
MCV RBC AUTO: 91.8 FL (ref 79–97)
PLATELET # BLD AUTO: 233 10*3/MM3 (ref 140–450)
POTASSIUM SERPL-SCNC: 4.1 MMOL/L (ref 3.5–5.2)
PROT SERPL-MCNC: 6.4 G/DL (ref 6–8.5)
RBC # BLD AUTO: 4.39 10*6/MM3 (ref 3.77–5.28)
SODIUM SERPL-SCNC: 141 MMOL/L (ref 136–145)
TRIGL SERPL-MCNC: 97 MG/DL (ref 0–150)
TSH SERPL DL<=0.005 MIU/L-ACNC: 1.64 UIU/ML (ref 0.27–4.2)
VLDLC SERPL CALC-MCNC: 17 MG/DL (ref 5–40)
WBC # BLD AUTO: 5.87 10*3/MM3 (ref 3.4–10.8)

## 2023-03-14 ENCOUNTER — TELEPHONE (OUTPATIENT)
Dept: GASTROENTEROLOGY | Facility: CLINIC | Age: 58
End: 2023-03-14

## 2023-03-14 NOTE — TELEPHONE ENCOUNTER
Caller: Carolin Zapien    Relationship: Self    Best call back number: [phone]    What is the best time to reach you: ANYTIME    Who are you requesting to speak with (clinical staff, provider,  specific staff member): CLINICAL STAFF    What was the call regarding: PATIENT WOULD LIKE TO SCHEDULE A COLONOSCOPY, AS HER LAST COLONOSCOPY WAS ON 8/21/20 AND THE PATIENT STATED DR. CABALLERO RECOMMENDED SHE GET A COLONOSCOPY EVERY THREE YEARS. PATIENT STATED THAT FOR THE LAST MONTH SHE HAS BEEN EXPERIENCING CONSTIPATION AND LOOSE STOOL. PATIENT STATED TODAY SHE BEGAN EXPERIENCING BLOOD IN HER STOOL. PATIENT IS REQUESTING A CALL BACK TO SPEAK WITH CLINICAL STAFF ABOUT THESE SYMPTOMS. CALL BACK ANYTIME, OK TO M.    Do you require a callback: YES

## 2023-03-14 NOTE — TELEPHONE ENCOUNTER
Called pt, she states she was on ATB for a sinus infection about a month ago.Since that time she has had a change of bowel habits including diarrhea and blood in stool.  States hx of non-bleeding internal hemorrhoids.  Pt takes a probiotic daily.      Made an appt for pt to see TARYN Guillen 5/3 @1:15pm , also added to wait list.     Advised will send a msg to TARYN Guillen for recommendations.

## 2023-03-15 NOTE — TELEPHONE ENCOUNTER
Looks like we have not seen her in 3 years.  Difficult to make recommendations through messaging would wait until I see her in the office.

## 2023-03-22 ENCOUNTER — OFFICE VISIT (OUTPATIENT)
Dept: GASTROENTEROLOGY | Facility: CLINIC | Age: 58
End: 2023-03-22
Payer: COMMERCIAL

## 2023-03-22 VITALS — BODY MASS INDEX: 26.24 KG/M2 | WEIGHT: 139 LBS | TEMPERATURE: 98.1 F | HEIGHT: 61 IN

## 2023-03-22 DIAGNOSIS — Z86.010 PERSONAL HISTORY OF COLONIC POLYPS: ICD-10-CM

## 2023-03-22 DIAGNOSIS — R19.4 CHANGE IN BOWEL HABITS: Primary | ICD-10-CM

## 2023-03-22 DIAGNOSIS — K62.5 RECTAL BLEEDING: ICD-10-CM

## 2023-03-22 PROCEDURE — 99214 OFFICE O/P EST MOD 30 MIN: CPT | Performed by: NURSE PRACTITIONER

## 2023-03-22 RX ORDER — SACCHAROMYCES BOULARDII 250 MG
250 CAPSULE ORAL DAILY
Qty: 14 CAPSULE | Refills: 0 | COMMUNITY
Start: 2023-03-22 | End: 2023-04-05

## 2023-03-22 NOTE — PROGRESS NOTES
"Chief Complaint   Patient presents with   • Rectal Bleeding       HPI    Carolin Zapien is a  57 y.o. female here to reestablish care as a new over 3-year patient for complaints of rectal bleeding.    This patient was last seen by Dr. Davison in 2020 she is new to me today.    Patient reports treatment with antibiotic therapy several months ago for sinusitis as such has had change in bowel habits with episodes of intermittent diarrhea and passage of small volume stools.  As such she has developed intermittent bright red blood per rectum.  Denies abdominal pain or rectal pain.  No weight loss.  She takes probiotics daily however has been on same probiotic now for at least 1 year.  She is anxious to move forward with colonoscopy as she is due for screening this year.    No upper GI complaints.    No lab work since onset of symptoms.    Additional data reviewed:    C-scope 2020 - Normal ileum, internal hemorrhoids, diverticulosis, TA polyps.  Recall 3 years.  EGD 2017 - normal findings    Past Medical History:   Diagnosis Date   • Asthma     \"MILD\"   USES INHALER MAYBE TWICE A YEAR   • Atypical chest pain 04/14/2014    RESOLVED; 07/20/2015   • Cataract     maria l premature   • Colon polyp August 2020    [facility]   • DDD (degenerative disc disease), cervical    • DDD (degenerative disc disease), cervical    • Degeneration of cervical intervertebral disc    • Diverticulitis    • Diverticulitis of colon 9/23/2010    colonoscopy indicated mild case   • Diverticulosis 2010    mild   • Fatigue    • GERD (gastroesophageal reflux disease)    • History of medical problems Raynaud's    2008 [id number]   • Hypothyroidism 2010   • Kidney cyst, acquired 10/24/2015   • Kidney stone    • Low back pain 2014   • Migraines    • Multiple joint pain 07/18/2013   • Non-functioning gallbladder    • Raynaud's disease    • Seasonal allergies    • Seborrheic dermatitis    • Thyroid disease    • Visual impairment 2014    age related       Past " Surgical History:   Procedure Laterality Date   • CHOLECYSTECTOMY N/A 2017    Procedure: CHOLECYSTECTOMY LAPAROSCOPIC;  Surgeon: Mc Gonzalez MD;  [place]  [facility] [place] [facility] [place]  Service:    • COLONOSCOPY N/A 2020    Procedure: COLONOSCOPY TO CECUM AND TERMINAL ILEUM WITH COLD SNARE POLYPECTOMIES;  Surgeon: Ganesh Davison MD;  Location:  [facility];  Service: Gastroenterology;  Laterality: N/A;  PRE- SCREENING  POST- NORMAL TI, COLON POLYPS, DIVERTICULOSIS, INTERNAL HEMORRHOIDS   • COLPOSCOPY      WITH BX; RESOLVED   • DILATATION AND CURETTAGE     • ENDOSCOPY N/A 2017    Procedure: ESOPHAGOGASTRODUODENOSCOPY with biopsies;  Surgeon: Ganesh Davison MD;  Location:  [facility];  Service:    • ENDOSCOPY AND COLONOSCOPY     • GALLBLADDER SURGERY     • SEPTOPLASTY     • SEPTOPLASTY, TURBINECTOMY, ENDOSCOPIC MAXILLARY ANTROSTOMY      [facility]   • SINUS SURGERY     • TONSILLECTOMY     • UPPER GASTROINTESTINAL ENDOSCOPY  2010    [facility]   • URETEROSCOPY      with cystoscopy       Scheduled Meds:     Continuous Infusions: No current facility-administered medications for this visit.      PRN Meds:     Allergies   Allergen Reactions   • Pantoprazole Hives   • Sulfa Antibiotics Hives   • Terbinafine Hives       Social History     Socioeconomic History   • Marital status:    • Number of children: 1   • Highest education level: Bachelor's degree (e.g., BA, AB, BS)   Tobacco Use   • Smoking status: Former     Packs/day: 1.00     Years: 20.00     Pack years: 20.00     Types: Cigarettes     Start date: 10/1/1984     Quit date: 2005     Years since quittin.3   • Smokeless tobacco: Never   Substance and Sexual Activity   • Alcohol use: Yes     Alcohol/week: 1.0 standard drink     Types: 1 Cans of beer per week     Comment: social drinker   • Drug use: No   • Sexual activity: Yes     Partners: Male     Birth control/protection: Post-menopausal       Family History   Problem  Relation Age of Onset   • Hodgkin's lymphoma Mother    • Migraines Mother    • Thyroid disease Mother    • Cancer Mother         Hodgkin's Lymphoma   • Early death Mother         age 53   • Hypertension Father    • Depression Father    • Atrial fibrillation Father    • Anxiety disorder Father    • Arthritis Father         Osteoarthritis   • Cancer Father         CLL   • Hearing loss Father         age related hearing loss   • Heart disease Father         atrial fib   • Hyperlipidemia Father    • Other Father         spinal myelopathy   • Migraines Sister    • Hyperlipidemia Sister    • Cardiomyopathy Brother         resolved   • Lymphoma Son         Tcell   • Cancer Son         T-Cell Lymphoma   • Hearing loss Son         congenital sensorineural   • Hyperlipidemia Sister    • Hyperlipidemia Brother    • Thyroid disease Brother    • Malig Hyperthermia Neg Hx        Review of Systems   Constitutional: Negative for activity change, appetite change, fatigue and unexpected weight change.   HENT: Negative for trouble swallowing.    Eyes: Negative.    Respiratory: Negative.    Cardiovascular: Negative.    Gastrointestinal: Positive for anal bleeding and diarrhea. Negative for abdominal distention, abdominal pain, blood in stool, constipation, nausea, rectal pain and vomiting.   Endocrine: Negative.    Genitourinary: Negative.    Musculoskeletal: Negative.    Allergic/Immunologic: Negative.    Neurological: Negative.    Hematological: Negative.    Psychiatric/Behavioral: Negative.        Vitals:    03/22/23 1423   Temp: 98.1 °F (36.7 °C)       Physical Exam  Constitutional:       Appearance: She is well-developed.   Abdominal:      General: Bowel sounds are normal. There is no distension.      Palpations: Abdomen is soft. There is no mass.      Tenderness: There is no abdominal tenderness. There is no guarding.      Hernia: No hernia is present.   Skin:     General: Skin is warm and dry.      Capillary Refill: Capillary  refill takes less than 2 seconds.   Neurological:      Mental Status: She is alert and oriented to person, place, and time.   Psychiatric:         Behavior: Behavior normal.     Assessment    Diagnoses and all orders for this visit:    1. Change in bowel habits (Primary)  -     CBC & Differential  -     Comprehensive Metabolic Panel  -     TSH  -     Case Request; Standing    2. Rectal bleeding  -     CBC & Differential  -     Comprehensive Metabolic Panel  -     TSH  -     Case Request; Standing    3. Personal history of colonic polyps  -     Case Request; Standing    Other orders  -     saccharomyces boulardii (Florastor) 250 MG capsule; Take 1 capsule by mouth Daily for 14 days.  Dispense: 14 capsule; Refill: 0       Plan    Patient likely experiencing post antibiotic IBS type symptoms but given rectal bleeding and personal history of colon polyps we will move forward with colonoscopy to be performed by Dr. Saman Linn today as above  Start alternative probiotic - Florastor samples provided  Further recommendations and follow-up pending the aforementioned work-up         MADELYN Jefferson  [facility]  [address] [place] [address]  Office: [phone]

## 2023-03-23 LAB
ALBUMIN SERPL-MCNC: 4.3 G/DL (ref 3.5–5.2)
ALBUMIN/GLOB SERPL: 1.7 G/DL
ALP SERPL-CCNC: 74 U/L (ref 39–117)
ALT SERPL-CCNC: 15 U/L (ref 1–33)
AST SERPL-CCNC: 21 U/L (ref 1–32)
BASOPHILS # BLD AUTO: 0.12 10*3/MM3 (ref 0–0.2)
BASOPHILS NFR BLD AUTO: 1.7 % (ref 0–1.5)
BILIRUB SERPL-MCNC: <0.2 MG/DL (ref 0–1.2)
BUN SERPL-MCNC: 15 MG/DL (ref 6–20)
BUN/CREAT SERPL: 23.8 (ref 7–25)
CALCIUM SERPL-MCNC: 9.5 MG/DL (ref 8.6–10.5)
CHLORIDE SERPL-SCNC: 105 MMOL/L (ref 98–107)
CO2 SERPL-SCNC: 26.9 MMOL/L (ref 22–29)
CREAT SERPL-MCNC: 0.63 MG/DL (ref 0.57–1)
EGFRCR SERPLBLD CKD-EPI 2021: 104.3 ML/MIN/1.73
EOSINOPHIL # BLD AUTO: 0.11 10*3/MM3 (ref 0–0.4)
EOSINOPHIL NFR BLD AUTO: 1.6 % (ref 0.3–6.2)
ERYTHROCYTE [DISTWIDTH] IN BLOOD BY AUTOMATED COUNT: 11.8 % (ref 12.3–15.4)
GLOBULIN SER CALC-MCNC: 2.5 GM/DL
GLUCOSE SERPL-MCNC: 87 MG/DL (ref 65–99)
HCT VFR BLD AUTO: 40.5 % (ref 34–46.6)
HGB BLD-MCNC: 13.4 G/DL (ref 12–15.9)
IMM GRANULOCYTES # BLD AUTO: 0.02 10*3/MM3 (ref 0–0.05)
IMM GRANULOCYTES NFR BLD AUTO: 0.3 % (ref 0–0.5)
LYMPHOCYTES # BLD AUTO: 1.75 10*3/MM3 (ref 0.7–3.1)
LYMPHOCYTES NFR BLD AUTO: 25.3 % (ref 19.6–45.3)
MCH RBC QN AUTO: 31.2 PG (ref 26.6–33)
MCHC RBC AUTO-ENTMCNC: 33.1 G/DL (ref 31.5–35.7)
MCV RBC AUTO: 94.2 FL (ref 79–97)
MONOCYTES # BLD AUTO: 0.62 10*3/MM3 (ref 0.1–0.9)
MONOCYTES NFR BLD AUTO: 8.9 % (ref 5–12)
NEUTROPHILS # BLD AUTO: 4.31 10*3/MM3 (ref 1.7–7)
NEUTROPHILS NFR BLD AUTO: 62.2 % (ref 42.7–76)
PLATELET # BLD AUTO: 233 10*3/MM3 (ref 140–450)
POTASSIUM SERPL-SCNC: 4.2 MMOL/L (ref 3.5–5.2)
PROT SERPL-MCNC: 6.8 G/DL (ref 6–8.5)
RBC # BLD AUTO: 4.3 10*6/MM3 (ref 3.77–5.28)
SODIUM SERPL-SCNC: 141 MMOL/L (ref 136–145)
TSH SERPL DL<=0.005 MIU/L-ACNC: 1.19 UIU/ML (ref 0.27–4.2)
WBC # BLD AUTO: 6.93 10*3/MM3 (ref 3.4–10.8)

## 2023-03-29 ENCOUNTER — TELEPHONE (OUTPATIENT)
Dept: GASTROENTEROLOGY | Facility: CLINIC | Age: 58
End: 2023-03-29
Payer: COMMERCIAL

## 2023-03-29 NOTE — TELEPHONE ENCOUNTER
----- Message from MADELYN Jefferson sent at 3/23/2023 12:30 PM EDT -----  Please inform the patient lab work is normal await endoscopic findings

## 2023-04-05 ENCOUNTER — TELEPHONE (OUTPATIENT)
Dept: GASTROENTEROLOGY | Facility: CLINIC | Age: 58
End: 2023-04-05

## 2023-04-05 NOTE — TELEPHONE ENCOUNTER
Hub staff attempted to follow warm transfer process and was unsuccessful     Caller: Carolin Zapien    Relationship to patient: Self    Best call back number:[phone]    Patient is needing: PT WAS TOLD BY  THAT SHE NEEDED TO BRING $1000 WITH HER TO COLONOSCOPY SET UP FOR 4/19. PT WANTS TO SPEAK WITH SOMEONE ABOUT HOW THIS IS BEING CODED.

## 2023-04-19 ENCOUNTER — OUTSIDE FACILITY SERVICE (OUTPATIENT)
Dept: GASTROENTEROLOGY | Facility: CLINIC | Age: 58
End: 2023-04-19
Payer: COMMERCIAL

## 2023-04-19 PROCEDURE — 45378 DIAGNOSTIC COLONOSCOPY: CPT | Performed by: INTERNAL MEDICINE

## 2023-05-30 ENCOUNTER — TELEPHONE (OUTPATIENT)
Dept: GASTROENTEROLOGY | Facility: CLINIC | Age: 58
End: 2023-05-30

## 2023-05-30 NOTE — TELEPHONE ENCOUNTER
----- Message from Ganesh Davison MD sent at 4/24/2023  3:55 PM EDT -----  Colonoscopy recall 5 years

## 2023-05-30 NOTE — TELEPHONE ENCOUNTER
Pt reviewed results via INDOM. Sent pt INDOM msg advising of results. Advised to call if any questions. Colonoscopy placed in  and recall for 4/19/28.

## 2023-07-21 NOTE — PROGRESS NOTES
Shoulder Scope RCR follow Up       Patient: Carolin Zapien        YOB: 1965      Chief Complaints: shoulder pain      History of Present Illness: Pt is here f/u shoulder arthroscopy, RCR        Allergies:   Allergies   Allergen Reactions    Pantoprazole Hives    Sulfa Antibiotics Hives    Terbinafine Hives       Medications:   Home Medications:  Current Outpatient Medications on File Prior to Visit   Medication Sig    acetaminophen (TYLENOL) 500 MG tablet Take 1 tablet by mouth Every 6 (Six) Hours As Needed for Mild Pain.    azelastine (ASTELIN) 0.1 % nasal spray     calcium carbonate EX (TUMS EX) 750 MG chewable tablet Chew 750 mg Daily As Needed. (Patient not taking: Reported on 3/22/2023)    estradiol (VIVELLE-DOT) 0.025 MG/24HR patch Place 1 patch on the skin as directed by provider 2 (Two) Times a Week.    fexofenadine (ALLEGRA) 180 MG tablet Take 1 tablet by mouth As Needed.    fluticasone (FLONASE) 50 MCG/ACT nasal spray 2 sprays into the nostril(s) as directed by provider Every Morning.    ibuprofen (ADVIL,MOTRIN) 200 MG tablet Take 1 tablet by mouth Every 6 (Six) Hours As Needed for Mild Pain.    levothyroxine (SYNTHROID, LEVOTHROID) 100 MCG tablet TAKE ONE TABLET BY MOUTH DAILY    Misc Natural Products (OSTEO BI-FLEX TRIPLE STRENGTH) tablet     Probiotic Product (PROBIOTIC DAILY PO) Take 1 tablet by mouth Daily.    progesterone (PROMETRIUM) 100 MG capsule Take 1 capsule by mouth Every Night.    Progesterone (PROMETRIUM) 100 MG capsule Take 1 capsule by mouth. (Patient not taking: Reported on 7/10/2023)    pseudoephedrine (SUDAFED) 120 MG 12 hr tablet Take 1 tablet by mouth Every 12 (Twelve) Hours As Needed.    Turmeric (QC TUMERIC COMPLEX PO) Take  by mouth.     No current facility-administered medications on file prior to visit.     Current Medications:  Scheduled Meds:  Continuous Infusions:No current facility-administered medications for this visit.    PRN Meds:.          Physical  Exam: 58 y.o. female  General Appearance:    Alert, cooperative, in no acute distress                 There were no vitals filed for this visit.   Patient is alert and oriented ×3 no acute distress normal mood physical exam.  Physical exam of the shoulder, incisions looked good there is no erythema,no signs or sx of infection.      Assessment  S/P shoulder scope, rotator cuff repair,

## 2023-07-21 NOTE — PROGRESS NOTES
Right Shoulder MRI Follow Up      Patient: Carolin Zapien        YOB: 1965            Chief Complaints: Shoulder pain right      History of Present Illness: The patient is here follow-up of an MRI of the shoulder MRI demonstrates some tendinosis of the supraspinatus mild to moderate she also has some subdeltoid bursitis remainder looks fine she is still symptomatic and unable to progress her activities.  Pickleball is what she really wants to do.  She is done an abundant amount of physical therapy have done an injection all with no significant nor lasting improvement.      Physical Exam: 58 y.o. female  General Appearance:    Alert, cooperative, in no acute distress                 There were no vitals filed for this visit.     Patient is alert and read ×3 no acute distress appears her above-listed at height weight and age.  Affect is normal respiratory rate is normal unlabored. Heart rate regular rate rhythm, sclera, dentition and hearing are normal for the purpose of this exam.      Ortho Exam  Physical exam of the right shoulder reveals no overlying skin changes no lymphedema no lymphadenopathy.  Patient has active flexion 180 with mild symptoms abduction is similar external rotation is to 50 and internal rotation to the upper lumbar spine with mild symptoms.  Patient has good rotator cuff strength 4+ over 5 with isometric strength testing with pain.  Patient has a positive impingement and a positive Khalil sign.  Patient has good cervical range of motion which is full and asymptomatic no radicular symptoms.  Patient has a normal elbow exam.  Good distal pulses are present  Patient has pain with overhead activity and a positive Neer sign and a positive empty can sign , a positive drop arm and a definitive painful arc     Physical Exam: 58 y.o. female  General Appearance:    Alert, cooperative, in no acute distress                      Vitals:    07/24/23 1636   Temp: 97.6 °F (36.4 °C)   Weight:  "63.4 kg (139 lb 12.8 oz)   Height: 154.9 cm (61\")   PainSc: 10-Worst pain ever        Head:    Normocephalic, without obvious abnormality, atraumatic   Eyes:            conjunctivae and sclerae normal, no pallor, corneas clear,    Ears:    Ears appear intact with no abnormalities noted   Throat:   No oral lesions, no thrush, oral mucosa moist   Neck:   No adenopathy, supple, trachea midline, no thyromegaly,    Back:     No kyphosis present, no scoliosis present, no skin lesions,      erythema or scars, no tenderness to percussion or                   palpation,   range of motion normal   Lungs:     ,respirations regular, even and                  unlabored    Heart:    Regular rhythm and normal rate               Chest Wall:    No abnormalities observed               Pulses:   Pulses palpable and equal bilaterally   Skin:   No bleeding, bruising or rash   Lymph nodes:   No palpable adenopathy   Neurologic:   Appears neurologic intact         MRI Results: MRIs as above have reviewed the films myself and agree with the findings  Procedures      Assessment/Plan: Persistent right shoulder pain despite conservative measures plan is to proceed with arthroscopy decompression.  I had a very long discussion with she and her  regarding the fact that I would address other pathology as deemed appropriate this could include pathology to the labrum biceps tendon this would necessitate debridement versus repair versus tenodesis versus tenotomy.  She understands all of these and agrees to proceed.  We did discuss in detail risk benefits and alternatives  The patient voiced understanding of the risks, benefits, and alternative forms of treatment that were discussed and the patient consents to proceed with the above listed surgery.  All risks, benefits and alternatives were discussed.  Risks including to but not exclusive to anesthetic complications, including death, MI, CVA, infection, bleeding DVT, fracture, residual pain " and need for future surgery.

## 2023-07-24 ENCOUNTER — OFFICE VISIT (OUTPATIENT)
Dept: ORTHOPEDIC SURGERY | Facility: CLINIC | Age: 58
End: 2023-07-24
Payer: COMMERCIAL

## 2023-07-24 VITALS — HEIGHT: 61 IN | BODY MASS INDEX: 26.39 KG/M2 | TEMPERATURE: 97.6 F | WEIGHT: 139.8 LBS

## 2023-07-24 DIAGNOSIS — M75.111 INCOMPLETE TEAR OF RIGHT ROTATOR CUFF, UNSPECIFIED WHETHER TRAUMATIC: Primary | ICD-10-CM

## 2023-07-24 PROCEDURE — 99214 OFFICE O/P EST MOD 30 MIN: CPT | Performed by: ORTHOPAEDIC SURGERY

## 2023-07-27 PROBLEM — M75.111 INCOMPLETE TEAR OF RIGHT ROTATOR CUFF: Status: ACTIVE | Noted: 2023-07-27

## 2023-08-23 ENCOUNTER — PRE-ADMISSION TESTING (OUTPATIENT)
Dept: PREADMISSION TESTING | Facility: HOSPITAL | Age: 58
End: 2023-08-23
Payer: COMMERCIAL

## 2023-08-23 VITALS
BODY MASS INDEX: 25.82 KG/M2 | HEART RATE: 80 BPM | SYSTOLIC BLOOD PRESSURE: 143 MMHG | RESPIRATION RATE: 18 BRPM | WEIGHT: 140.3 LBS | HEIGHT: 62 IN | TEMPERATURE: 97.7 F | DIASTOLIC BLOOD PRESSURE: 84 MMHG | OXYGEN SATURATION: 100 %

## 2023-08-23 LAB
ANION GAP SERPL CALCULATED.3IONS-SCNC: 7.3 MMOL/L (ref 5–15)
BUN SERPL-MCNC: 13 MG/DL (ref 6–20)
BUN/CREAT SERPL: 20 (ref 7–25)
CALCIUM SPEC-SCNC: 9 MG/DL (ref 8.6–10.5)
CHLORIDE SERPL-SCNC: 108 MMOL/L (ref 98–107)
CO2 SERPL-SCNC: 24.7 MMOL/L (ref 22–29)
CREAT SERPL-MCNC: 0.65 MG/DL (ref 0.57–1)
DEPRECATED RDW RBC AUTO: 44.2 FL (ref 37–54)
EGFRCR SERPLBLD CKD-EPI 2021: 102.8 ML/MIN/1.73
ERYTHROCYTE [DISTWIDTH] IN BLOOD BY AUTOMATED COUNT: 12.9 % (ref 12.3–15.4)
GLUCOSE SERPL-MCNC: 86 MG/DL (ref 65–99)
HCT VFR BLD AUTO: 40.8 % (ref 34–46.6)
HGB BLD-MCNC: 13.7 G/DL (ref 12–15.9)
MCH RBC QN AUTO: 31.2 PG (ref 26.6–33)
MCHC RBC AUTO-ENTMCNC: 33.6 G/DL (ref 31.5–35.7)
MCV RBC AUTO: 92.9 FL (ref 79–97)
PLATELET # BLD AUTO: 229 10*3/MM3 (ref 140–450)
PMV BLD AUTO: 12.3 FL (ref 6–12)
POTASSIUM SERPL-SCNC: 3.8 MMOL/L (ref 3.5–5.2)
RBC # BLD AUTO: 4.39 10*6/MM3 (ref 3.77–5.28)
SODIUM SERPL-SCNC: 140 MMOL/L (ref 136–145)
WBC NRBC COR # BLD: 7.81 10*3/MM3 (ref 3.4–10.8)

## 2023-08-23 PROCEDURE — 36415 COLL VENOUS BLD VENIPUNCTURE: CPT

## 2023-08-23 PROCEDURE — 80048 BASIC METABOLIC PNL TOTAL CA: CPT

## 2023-08-23 PROCEDURE — 85027 COMPLETE CBC AUTOMATED: CPT

## 2023-08-23 RX ORDER — MULTIVIT-MIN/IRON/FOLIC ACID/K 18-600-40
2000 CAPSULE ORAL DAILY
COMMUNITY

## 2023-08-23 RX ORDER — ESTRADIOL 0.1 MG/G
2 CREAM VAGINAL 2 TIMES WEEKLY
COMMUNITY

## 2023-08-23 RX ORDER — EPINEPHRINE 0.3 MG/.3ML
INJECTION SUBCUTANEOUS
COMMUNITY
Start: 2023-07-26

## 2023-08-23 NOTE — DISCHARGE INSTRUCTIONS
Take the following medications the morning of surgery:  LEVOTHYROXINE    YOU WILL BE NOTIFIED OF ARRIVAL TIME    If you are on prescription narcotic pain medication to control your pain you may also take that medication the morning of surgery.    General Instructions:  Do not eat solid food after midnight the night before surgery.  You may drink clear liquids day of surgery but must stop at least one hour before your hospital arrival time.  It is beneficial for you to have a clear drink that contains carbohydrates the day of surgery.  We suggest a 12 to 20 ounce bottle of Gatorade or Powerade for non-diabetic patients or a 12 to 20 ounce bottle of G2 or Powerade Zero for diabetic patients. (Pediatric patients, are not advised to drink a 12 to 20 ounce carbohydrate drink)    Clear liquids are liquids you can see through.  Nothing red in color.     Plain water                               Sports drinks  Sodas                                   Gelatin (Jell-O)  Fruit juices without pulp such as white grape juice and apple juice  Popsicles that contain no fruit or yogurt  Tea or coffee (no cream or milk added)  Gatorade / Powerade  G2 / Powerade Zero    Infants may have breast milk up to four hours before surgery.  Infants drinking formula may drink formula up to six hours before surgery.   Patients who avoid smoking, chewing tobacco and alcohol for 4 weeks prior to surgery have a reduced risk of post-operative complications.  Quit smoking as many days before surgery as you can.  Do not smoke, use chewing tobacco or drink alcohol the day of surgery.   If applicable bring your C-PAP/ BI-PAP machine in with you to preop day of surgery.  Bring any papers given to you in the doctor's office.  Wear clean comfortable clothes.  Do not wear contact lenses, false eyelashes or make-up.  Bring a case for your glasses.   Bring crutches or walker if applicable.  Remove all piercings.  Leave jewelry and any other valuables at  home.  Hair extensions with metal clips must be removed prior to surgery.  The Pre-Admission Testing nurse will instruct you to bring medications if unable to obtain an accurate list in Pre-Admission Testing.        If you were given a blood bank ID arm band remember to bring it with you the day of surgery.    Preventing a Surgical Site Infection:  For 2 to 3 days before surgery, avoid shaving with a razor because the razor can irritate skin and make it easier to develop an infection.    Any areas of open skin can increase the risk of a post-operative wound infection by allowing bacteria to enter and travel throughout the body.  Notify your surgeon if you have any skin wounds / rashes even if it is not near the expected surgical site.  The area will need assessed to determine if surgery should be delayed until it is healed.  The night prior to surgery shower using a fresh bar of anti-bacterial soap (such as Dial) and clean washcloth.  Sleep in a clean bed with clean clothing.  Do not allow pets to sleep with you.  Shower on the morning of surgery using a fresh bar of anti-bacterial soap (such as Dial) and clean washcloth.  Dry with a clean towel and dress in clean clothing.  Ask your surgeon if you will be receiving antibiotics prior to surgery.  Make sure you, your family, and all healthcare providers clean their hands with soap and water or an alcohol based hand  before caring for you or your wound.    Day of surgery: 8/30/2023   Your arrival time is approximately two hours before your scheduled surgery time.  Upon arrival, a Pre-op nurse and Anesthesiologist will review your health history, obtain vital signs, and answer questions you may have.  The only belongings needed at this time will be a list of your home medications and if applicable your C-PAP/BI-PAP machine.  A Pre-op nurse will start an IV and you may receive medication in preparation for surgery, including something to help you relax.      Please be aware that surgery does come with discomfort.  We want to make every effort to control your discomfort so please discuss any uncontrolled symptoms with your nurse.   Your doctor will most likely have prescribed pain medications.      If you are going home after surgery you will receive individualized written care instructions before being discharged.  A responsible adult must drive you to and from the hospital on the day of your surgery and stay with you for 24 hours.  Discharge prescriptions can be filled by the hospital pharmacy during regular pharmacy hours.  If you are having surgery late in the day/evening your prescription may be e-prescribed to your pharmacy.  Please verify your pharmacy hours or chose a 24 hour pharmacy to avoid not having access to your prescription because your pharmacy has closed for the day.    If you are staying overnight following surgery, you will be transported to your hospital room following the recovery period.  [facility] has all private rooms.    If you have any questions please call Pre-Admission Testing at [phone].  Deductibles and co-payments are collected on the day of service. Please be prepared to pay the required co-pay, deductible or deposit on the day of service as defined by your plan.    Call your surgeon immediately if you experience any of the following symptoms:  Sore Throat  Shortness of Breath or difficulty breathing  Cough  Chills  Body soreness or muscle pain  Headache  Fever  New loss of taste or smell  Do not arrive for your surgery ill.  Your procedure will need to be rescheduled to another time.  You will need to call your physician before the day of surgery to avoid any unnecessary exposure to hospital staff as well as other patients.

## 2023-08-30 ENCOUNTER — HOSPITAL ENCOUNTER (OUTPATIENT)
Facility: HOSPITAL | Age: 58
Setting detail: HOSPITAL OUTPATIENT SURGERY
Discharge: HOME | End: 2023-08-30
Attending: ORTHOPAEDIC SURGERY | Admitting: ORTHOPAEDIC SURGERY
Payer: COMMERCIAL

## 2023-08-30 ENCOUNTER — ANESTHESIA EVENT (OUTPATIENT)
Dept: PERIOP | Facility: HOSPITAL | Age: 58
End: 2023-08-30
Payer: COMMERCIAL

## 2023-08-30 ENCOUNTER — ANESTHESIA (OUTPATIENT)
Dept: PERIOP | Facility: HOSPITAL | Age: 58
End: 2023-08-30
Payer: COMMERCIAL

## 2023-08-30 VITALS
RESPIRATION RATE: 18 BRPM | HEIGHT: 61 IN | DIASTOLIC BLOOD PRESSURE: 92 MMHG | SYSTOLIC BLOOD PRESSURE: 134 MMHG | HEART RATE: 66 BPM | WEIGHT: 140.21 LBS | BODY MASS INDEX: 26.47 KG/M2 | OXYGEN SATURATION: 100 % | TEMPERATURE: 97.2 F

## 2023-08-30 DIAGNOSIS — M75.111 INCOMPLETE TEAR OF RIGHT ROTATOR CUFF, UNSPECIFIED WHETHER TRAUMATIC: ICD-10-CM

## 2023-08-30 PROCEDURE — C9290 INJ, BUPIVACAINE LIPOSOME: HCPCS | Performed by: ANESTHESIOLOGY

## 2023-08-30 PROCEDURE — 25010000002 DEXAMETHASONE SODIUM PHOSPHATE 20 MG/5ML SOLUTION: Performed by: NURSE ANESTHETIST, CERTIFIED REGISTERED

## 2023-08-30 PROCEDURE — 25010000002 ONDANSETRON PER 1 MG: Performed by: NURSE ANESTHETIST, CERTIFIED REGISTERED

## 2023-08-30 PROCEDURE — 25010000002 EPINEPHRINE PER 0.1 MG: Performed by: ORTHOPAEDIC SURGERY

## 2023-08-30 PROCEDURE — 25010000002 CEFAZOLIN IN DEXTROSE 2-4 GM/100ML-% SOLUTION: Performed by: ORTHOPAEDIC SURGERY

## 2023-08-30 PROCEDURE — 25010000002 MIDAZOLAM PER 1 MG: Performed by: ANESTHESIOLOGY

## 2023-08-30 PROCEDURE — 29823 SHO ARTHRS SRG XTNSV DBRDMT: CPT | Performed by: ORTHOPAEDIC SURGERY

## 2023-08-30 PROCEDURE — 25010000002 PROPOFOL 10 MG/ML EMULSION: Performed by: NURSE ANESTHETIST, CERTIFIED REGISTERED

## 2023-08-30 PROCEDURE — 25010000002 BUPIVACAINE (PF) 0.5 % SOLUTION: Performed by: ANESTHESIOLOGY

## 2023-08-30 PROCEDURE — 25010000002 METHYLPREDNISOLONE PER 80 MG: Performed by: ORTHOPAEDIC SURGERY

## 2023-08-30 PROCEDURE — 0 BUPIVACAINE LIPOSOME 1.3 % SUSPENSION: Performed by: ANESTHESIOLOGY

## 2023-08-30 PROCEDURE — 29826 SHO ARTHRS SRG DECOMPRESSION: CPT | Performed by: ORTHOPAEDIC SURGERY

## 2023-08-30 RX ORDER — HYDROCODONE BITARTRATE AND ACETAMINOPHEN 7.5; 325 MG/1; MG/1
1 TABLET ORAL ONCE AS NEEDED
Status: DISCONTINUED | OUTPATIENT
Start: 2023-08-30 | End: 2023-08-30 | Stop reason: HOSPADM

## 2023-08-30 RX ORDER — LABETALOL HYDROCHLORIDE 5 MG/ML
5 INJECTION, SOLUTION INTRAVENOUS
Status: DISCONTINUED | OUTPATIENT
Start: 2023-08-30 | End: 2023-08-30 | Stop reason: HOSPADM

## 2023-08-30 RX ORDER — PROPOFOL 10 MG/ML
VIAL (ML) INTRAVENOUS AS NEEDED
Status: DISCONTINUED | OUTPATIENT
Start: 2023-08-30 | End: 2023-08-30 | Stop reason: SURG

## 2023-08-30 RX ORDER — ONDANSETRON 4 MG/1
4 TABLET, FILM COATED ORAL EVERY 8 HOURS PRN
Qty: 12 TABLET | Refills: 0 | Status: SHIPPED | OUTPATIENT
Start: 2023-08-30

## 2023-08-30 RX ORDER — DROPERIDOL 2.5 MG/ML
0.62 INJECTION, SOLUTION INTRAMUSCULAR; INTRAVENOUS
Status: DISCONTINUED | OUTPATIENT
Start: 2023-08-30 | End: 2023-08-30 | Stop reason: HOSPADM

## 2023-08-30 RX ORDER — LIDOCAINE HYDROCHLORIDE 10 MG/ML
0.5 INJECTION, SOLUTION INFILTRATION; PERINEURAL ONCE AS NEEDED
Status: DISCONTINUED | OUTPATIENT
Start: 2023-08-30 | End: 2023-08-30 | Stop reason: HOSPADM

## 2023-08-30 RX ORDER — SODIUM CHLORIDE 0.9 % (FLUSH) 0.9 %
3 SYRINGE (ML) INJECTION EVERY 12 HOURS SCHEDULED
Status: DISCONTINUED | OUTPATIENT
Start: 2023-08-30 | End: 2023-08-30 | Stop reason: HOSPADM

## 2023-08-30 RX ORDER — EPHEDRINE SULFATE 50 MG/ML
5 INJECTION, SOLUTION INTRAVENOUS ONCE AS NEEDED
Status: DISCONTINUED | OUTPATIENT
Start: 2023-08-30 | End: 2023-08-30 | Stop reason: HOSPADM

## 2023-08-30 RX ORDER — FAMOTIDINE 10 MG/ML
20 INJECTION, SOLUTION INTRAVENOUS ONCE
Status: COMPLETED | OUTPATIENT
Start: 2023-08-30 | End: 2023-08-30

## 2023-08-30 RX ORDER — METHYLPREDNISOLONE ACETATE 80 MG/ML
INJECTION, SUSPENSION INTRA-ARTICULAR; INTRALESIONAL; INTRAMUSCULAR; SOFT TISSUE AS NEEDED
Status: DISCONTINUED | OUTPATIENT
Start: 2023-08-30 | End: 2023-08-30 | Stop reason: HOSPADM

## 2023-08-30 RX ORDER — SODIUM CHLORIDE, SODIUM LACTATE, POTASSIUM CHLORIDE, CALCIUM CHLORIDE 600; 310; 30; 20 MG/100ML; MG/100ML; MG/100ML; MG/100ML
9 INJECTION, SOLUTION INTRAVENOUS CONTINUOUS
Status: DISCONTINUED | OUTPATIENT
Start: 2023-08-30 | End: 2023-08-30 | Stop reason: HOSPADM

## 2023-08-30 RX ORDER — ONDANSETRON 2 MG/ML
4 INJECTION INTRAMUSCULAR; INTRAVENOUS ONCE AS NEEDED
Status: DISCONTINUED | OUTPATIENT
Start: 2023-08-30 | End: 2023-08-30 | Stop reason: HOSPADM

## 2023-08-30 RX ORDER — LIDOCAINE HYDROCHLORIDE 20 MG/ML
INJECTION, SOLUTION EPIDURAL; INFILTRATION; INTRACAUDAL; PERINEURAL AS NEEDED
Status: DISCONTINUED | OUTPATIENT
Start: 2023-08-30 | End: 2023-08-30 | Stop reason: SURG

## 2023-08-30 RX ORDER — HYDROCODONE BITARTRATE AND ACETAMINOPHEN 5; 325 MG/1; MG/1
1 TABLET ORAL EVERY 4 HOURS PRN
Qty: 40 TABLET | Refills: 0 | Status: SHIPPED | OUTPATIENT
Start: 2023-08-30

## 2023-08-30 RX ORDER — MIDAZOLAM HYDROCHLORIDE 1 MG/ML
1 INJECTION INTRAMUSCULAR; INTRAVENOUS
Status: DISCONTINUED | OUTPATIENT
Start: 2023-08-30 | End: 2023-08-30 | Stop reason: HOSPADM

## 2023-08-30 RX ORDER — PROMETHAZINE HYDROCHLORIDE 25 MG/1
25 SUPPOSITORY RECTAL ONCE AS NEEDED
Status: DISCONTINUED | OUTPATIENT
Start: 2023-08-30 | End: 2023-08-30 | Stop reason: HOSPADM

## 2023-08-30 RX ORDER — BUPIVACAINE HYDROCHLORIDE AND EPINEPHRINE 2.5; 5 MG/ML; UG/ML
INJECTION, SOLUTION EPIDURAL; INFILTRATION; INTRACAUDAL; PERINEURAL AS NEEDED
Status: DISCONTINUED | OUTPATIENT
Start: 2023-08-30 | End: 2023-08-30 | Stop reason: HOSPADM

## 2023-08-30 RX ORDER — CEFAZOLIN SODIUM 2 G/100ML
2 INJECTION, SOLUTION INTRAVENOUS ONCE
Status: COMPLETED | OUTPATIENT
Start: 2023-08-30 | End: 2023-08-30

## 2023-08-30 RX ORDER — PROMETHAZINE HYDROCHLORIDE 25 MG/1
25 TABLET ORAL ONCE AS NEEDED
Status: DISCONTINUED | OUTPATIENT
Start: 2023-08-30 | End: 2023-08-30 | Stop reason: HOSPADM

## 2023-08-30 RX ORDER — SODIUM CHLORIDE 0.9 % (FLUSH) 0.9 %
3-10 SYRINGE (ML) INJECTION AS NEEDED
Status: DISCONTINUED | OUTPATIENT
Start: 2023-08-30 | End: 2023-08-30 | Stop reason: HOSPADM

## 2023-08-30 RX ORDER — FLUMAZENIL 0.1 MG/ML
0.2 INJECTION INTRAVENOUS AS NEEDED
Status: DISCONTINUED | OUTPATIENT
Start: 2023-08-30 | End: 2023-08-30 | Stop reason: HOSPADM

## 2023-08-30 RX ORDER — FENTANYL CITRATE 50 UG/ML
50 INJECTION, SOLUTION INTRAMUSCULAR; INTRAVENOUS
Status: DISCONTINUED | OUTPATIENT
Start: 2023-08-30 | End: 2023-08-30 | Stop reason: HOSPADM

## 2023-08-30 RX ORDER — DIPHENHYDRAMINE HYDROCHLORIDE 50 MG/ML
12.5 INJECTION INTRAMUSCULAR; INTRAVENOUS
Status: DISCONTINUED | OUTPATIENT
Start: 2023-08-30 | End: 2023-08-30 | Stop reason: HOSPADM

## 2023-08-30 RX ORDER — BUPIVACAINE HYDROCHLORIDE 5 MG/ML
INJECTION, SOLUTION EPIDURAL; INTRACAUDAL
Status: COMPLETED | OUTPATIENT
Start: 2023-08-30 | End: 2023-08-30

## 2023-08-30 RX ORDER — DEXAMETHASONE SODIUM PHOSPHATE 4 MG/ML
INJECTION, SOLUTION INTRA-ARTICULAR; INTRALESIONAL; INTRAMUSCULAR; INTRAVENOUS; SOFT TISSUE AS NEEDED
Status: DISCONTINUED | OUTPATIENT
Start: 2023-08-30 | End: 2023-08-30 | Stop reason: SURG

## 2023-08-30 RX ORDER — ONDANSETRON 2 MG/ML
INJECTION INTRAMUSCULAR; INTRAVENOUS AS NEEDED
Status: DISCONTINUED | OUTPATIENT
Start: 2023-08-30 | End: 2023-08-30 | Stop reason: SURG

## 2023-08-30 RX ORDER — HYDROMORPHONE HYDROCHLORIDE 1 MG/ML
0.5 INJECTION, SOLUTION INTRAMUSCULAR; INTRAVENOUS; SUBCUTANEOUS
Status: DISCONTINUED | OUTPATIENT
Start: 2023-08-30 | End: 2023-08-30 | Stop reason: HOSPADM

## 2023-08-30 RX ORDER — OXYCODONE AND ACETAMINOPHEN 7.5; 325 MG/1; MG/1
1 TABLET ORAL EVERY 4 HOURS PRN
Status: DISCONTINUED | OUTPATIENT
Start: 2023-08-30 | End: 2023-08-30 | Stop reason: HOSPADM

## 2023-08-30 RX ORDER — IPRATROPIUM BROMIDE AND ALBUTEROL SULFATE 2.5; .5 MG/3ML; MG/3ML
3 SOLUTION RESPIRATORY (INHALATION) ONCE AS NEEDED
Status: DISCONTINUED | OUTPATIENT
Start: 2023-08-30 | End: 2023-08-30 | Stop reason: HOSPADM

## 2023-08-30 RX ORDER — NALOXONE HCL 0.4 MG/ML
0.2 VIAL (ML) INJECTION AS NEEDED
Status: DISCONTINUED | OUTPATIENT
Start: 2023-08-30 | End: 2023-08-30 | Stop reason: HOSPADM

## 2023-08-30 RX ADMIN — DEXAMETHASONE SODIUM PHOSPHATE 8 MG: 4 INJECTION, SOLUTION INTRAMUSCULAR; INTRAVENOUS at 08:15

## 2023-08-30 RX ADMIN — PROPOFOL 150 MG: 10 INJECTION, EMULSION INTRAVENOUS at 08:04

## 2023-08-30 RX ADMIN — BUPIVACAINE HYDROCHLORIDE 10 ML: 5 INJECTION, SOLUTION EPIDURAL; INTRACAUDAL; PERINEURAL at 07:30

## 2023-08-30 RX ADMIN — ONDANSETRON 4 MG: 2 INJECTION INTRAMUSCULAR; INTRAVENOUS at 08:39

## 2023-08-30 RX ADMIN — PROPOFOL 50 MCG/KG/MIN: 10 INJECTION, EMULSION INTRAVENOUS at 08:20

## 2023-08-30 RX ADMIN — LIDOCAINE HYDROCHLORIDE 100 MG: 20 INJECTION, SOLUTION EPIDURAL; INFILTRATION; INTRACAUDAL; PERINEURAL at 08:04

## 2023-08-30 RX ADMIN — CEFAZOLIN SODIUM 2 G: 2 INJECTION, SOLUTION INTRAVENOUS at 07:54

## 2023-08-30 RX ADMIN — FAMOTIDINE 20 MG: 10 INJECTION INTRAVENOUS at 07:01

## 2023-08-30 RX ADMIN — BUPIVACAINE 10 ML: 13.3 INJECTION, SUSPENSION, LIPOSOMAL INFILTRATION at 07:30

## 2023-08-30 RX ADMIN — SODIUM CHLORIDE, POTASSIUM CHLORIDE, SODIUM LACTATE AND CALCIUM CHLORIDE 9 ML/HR: 600; 310; 30; 20 INJECTION, SOLUTION INTRAVENOUS at 07:01

## 2023-08-30 RX ADMIN — PROPOFOL 50 MG: 10 INJECTION, EMULSION INTRAVENOUS at 08:07

## 2023-08-30 RX ADMIN — MIDAZOLAM 0.5 MG: 1 INJECTION INTRAMUSCULAR; INTRAVENOUS at 07:28

## 2023-08-30 NOTE — ANESTHESIA PROCEDURE NOTES
Interscalene nerve block      Patient reassessed immediately prior to procedure    Patient location during procedure: pre-op  Start time: 8/30/2023 7:30 AM  Stop time: 8/30/2023 7:35 AM  Reason for block: at surgeon's request and post-op pain management  Performed by  Anesthesiologist: Renee Delgado MD  Preanesthetic Checklist  Completed: patient identified, IV checked, site marked, risks and benefits discussed, surgical consent, monitors and equipment checked, pre-op evaluation and timeout performed  Prep:  Pt Position: sitting  Sterile barriers:cap, gloves and mask  Prep: ChloraPrep  Patient monitoring: blood pressure monitoring, continuous pulse oximetry and EKG  Procedure    Sedation: yes  Performed under: local infiltration  Guidance:ultrasound guided    ULTRASOUND INTERPRETATION.  Using ultrasound guidance a 22 G gauge needle was placed in close proximity to the brachial plexus nerve, at which point, under ultrasound guidance anesthetic was injected in the area of the nerve and spread of the anesthesia was seen on ultrasound in close proximity thereto.  There were no abnormalities seen on ultrasound; a digital image was taken; and the patient tolerated the procedure with no complications. Images:still images obtained, printed/placed on chart    Laterality:right  Block Type:interscalene  Injection Technique:single-shot  Needle Type:short-bevel and echogenic  Needle Gauge:22 G  Resistance on Injection: none    Medications Used: bupivacaine liposome (EXPAREL) 1.3 % injection - Infiltration   10 mL - 8/30/2023 7:30:00 AM  bupivacaine (PF) (MARCAINE) 0.5 % injection - Injection   10 mL - 8/30/2023 7:30:00 AM      Medications  Comment:Ultrasound Interpretation:  Using ultrasound guidance the needle was placed in close proximity to the target nerve and anesthetic was injected in the area of the target nerve and/or bundles, and spread of the anesthetic was seen on ultrasound in close proximity thereto.  There  were no abnormalities seen on ultrasound; a digital image was taken; and the patient tolerated the procedure with no complications.    Block placed for postoperative pain control per surgeon request.    Post Assessment  Injection Assessment: negative aspiration for heme, no paresthesia on injection and incremental injection  Patient Tolerance:comfortable throughout block  Complications:no

## 2023-08-30 NOTE — H&P
History & Physical       Patient: Carolin Zapien    Date of Admission: 8/30/2023  6:02 AM    YOB: 1965    Medical Record Number: [id number]    Attending Physician: Rosaura Rushing MD        Chief Complaints: Incomplete tear of right rotator cuff, unspecified whether traumatic [M75.111]      History of Present Illness: This patient several month history of right shoulder pain that has been unresponsive to conservative management she cannot proceed in the activities that she wants to do.  She has an MRI which looks more like impingement morphology exam is consistent with that plan is to proceed with arthroscopy she understands in the event I find other pathology at time of surgery would address it as deemed appropriate.  This could include pathology with the biceps tendon and labrum rotator cuff she understands these and agrees to proceed     Allergies:   Allergies   Allergen Reactions    Pantoprazole Hives    Sulfa Antibiotics Hives    Terbinafine Hives       Medications:   Home Medications:  No current facility-administered medications on file prior to encounter.     Current Outpatient Medications on File Prior to Encounter   Medication Sig    acetaminophen (TYLENOL) 500 MG tablet Take 1 tablet by mouth Every 6 (Six) Hours As Needed for Mild Pain.    azelastine (ASTELIN) 0.1 % nasal spray 1 spray 2 (Two) Times a Day As Needed.    estradiol (VIVELLE-DOT) 0.025 MG/24HR patch Place 1 patch on the skin as directed by provider 2 (Two) Times a Week.    fluticasone (FLONASE) 50 MCG/ACT nasal spray 2 sprays into the nostril(s) as directed by provider Every Morning.    levothyroxine (SYNTHROID, LEVOTHROID) 100 MCG tablet TAKE ONE TABLET BY MOUTH DAILY    Misc Natural Products (OSTEO BI-FLEX TRIPLE STRENGTH) tablet HOLD PRIOR TO SURG    Probiotic Product (PROBIOTIC DAILY PO) Take 1 tablet by mouth Daily.    progesterone (PROMETRIUM) 100 MG capsule Take 1 capsule by mouth Every Night.    Turmeric (QC  TUMERIC COMPLEX PO) Take  by mouth. HOLD PRIOR TO SURG    fexofenadine (ALLEGRA) 180 MG tablet Take 1 tablet by mouth As Needed.    ibuprofen (ADVIL,MOTRIN) 200 MG tablet Take 1 tablet by mouth Every 6 (Six) Hours As Needed for Mild Pain. HOLD PRIOR TO SURG    pseudoephedrine (SUDAFED) 120 MG 12 hr tablet Take 1 tablet by mouth Every 12 (Twelve) Hours As Needed.     Current Medications:  Scheduled Meds:ceFAZolin, 2 g, Intravenous, Once  sodium chloride, 3 mL, Intravenous, Q12H      Continuous Infusions:lactated ringers, 9 mL/hr, Last Rate: 9 mL/hr (08/30/23 0701)      PRN Meds:.  lidocaine    midazolam    sodium chloride    Past Medical History:   Diagnosis Date    Allergy-induced asthma     Arthritis of neck 1998    Degenerative cervical disk disease    Bursitis of hip 2020    saw Dr. Leonardo for left hip pain    Cataract     maria l premature    Colon polyp August 2020    [facility]    DDD (degenerative disc disease), cervical     Diverticulitis of colon 9/23/2010    colonoscopy indicated mild case    Diverticulosis 2010    mild    Hypothyroidism 2010    Kidney cyst, acquired 10/24/2015    Migraines     RARELY    Multiple joint pain 07/18/2013    Rash     UPPER RIGHT CHEST INSTRUCTED PT TO NOTIFY DR RUSHING IF NOT GONE BY DAY OF SURGERY    Raynaud's disease     Right shoulder pain     Rotator cuff syndrome August 2022    see Dr. Rushing for this    Seasonal allergies     Seborrheic dermatitis         Past Surgical History:   Procedure Laterality Date    CHOLECYSTECTOMY N/A 12/29/2017    Procedure: CHOLECYSTECTOMY LAPAROSCOPIC;  Surgeon: Mc Gonzalez MD;  Location: [facility] OR [facility];  Service:     COLONOSCOPY N/A 08/21/2020    Procedure: COLONOSCOPY TO CECUM AND TERMINAL ILEUM WITH COLD SNARE POLYPECTOMIES;  Surgeon: Ganesh Davison MD;  Location: [facility] ENDOSCOPY;  Service: Gastroenterology;  Laterality: N/A;  PRE- SCREENING  POST- NORMAL TI, COLON POLYPS, DIVERTICULOSIS, INTERNAL HEMORRHOIDS    COLPOSCOPY      WITH BX;  Ms. Álvarez is a 23 year old woman with ACC/AHA Stage D chronic systolic heart failure due to a dilated nonischemic cardiomyopathy (LV 6.7 cm, EF 10-15%) currently listed status 7 for heart transplantation. She had an extensive hospital stay over the last few months in the setting of a PEA arrest/cardiogenic following elective termination of pregnancy requiring [facility] ECMO (decannulated 7/3), PICC-associated infection with coagulase negative staph bactermia/candidemia, AVNRT. 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 Due to persistent requirement for high dose inotropic support and readmission with acute renal failure, she underwent   HM3 LVAD implanation as BTT on 8/25.   Her postoperative course was complicated by a seizure.   On 8/31 she developed left lung collapse and loculated hemorrhagic effusion and well as a small hemorrhagic pericardial effusion. With bronchoscopy and chest tube placement on 8/31 her lung expanded, but she remains intubated.   Hepatology for elevated LFT's- possibly 2nd to depakote and hydralazine-anticipate trend down  LDH spiked to 1131, remains on heparin gtt for anticoagulation.   Persistent fevers ,  (Blood culture with Staph epi 1/2 sets on 8/1/20 s/p dapto and cefepime. BCx negative on 8/2  Candida albicans in a single blood culture set 7/4 secondary to line vs right groin infection- s/p caspofungin finished on 7/25  BCx on 8/24 Micrococcus luteus (GPC in clusters)  BCx neg 8/26, 8/29, 8/31)  9/5 extubted  The pt's nutritional status is marginal, refuses ngt  Bedside s&s  rec dys 1 diet  9/7 Tx to sdu.  9/8 1:1 for patient safety, follows commands, restless, concern for driveline dislodgement.  Low grade temp today, new cultures sent  9/9 CT C/A/P ordere for low grade temps, r/o abcess.  Nutrition and s&s f/u for poor po intake  Narrow complex svt yesterday,  converted with adenosine 6mg iv.  EPS called, suggest low dose BB and patient and family were reluctant to pursue further procedures at this time however this will need to be further explored with the patients mother.  9/10 HD stable.  sp IR drainage 1.5cc old blood from sterum--sent for culture.  Advanced to dysphagia 3 diet.  A/C on coumadin INR 1.97.  MAP 70-80.    9/11 VSS stable.  afebrile.  Tolerating dysphagia 3 diet.  A/C on coumadin 2.5mg for INR 1.92.  MAP 84.  .  education in progress.   9/12 VSS - pt requesting to walk w/ PCA but would not leave hallway & return to her room when requested.  Pt slightly agitated - placed on a 1:1 for pt. safety.  9/13 1;1 observation discontinued. Episode of nausea this am resolved. Coumdin2.5mg for 2.31 inr .Antibiotics discontinued as per ID  9/14 HD stable, no complaints overnight. AM labs pending. Pt mobilizing and stable on ambulation.      RESOLVED    DILATATION AND CURETTAGE      ENDOSCOPY N/A 2017    Procedure: ESOPHAGOGASTRODUODENOSCOPY with biopsies;  Surgeon: Ganesh Davison MD;  Location: [facility] ENDOSCOPY;  Service:     ENDOSCOPY AND COLONOSCOPY      GUM SURGERY      LEFT UPPER JAW    SEPTOPLASTY, TURBINECTOMY, ENDOSCOPIC MAXILLARY ANTROSTOMY      [facility]    TONSILLECTOMY      TRIGGER POINT INJECTION      Cervical branch block    UPPER GASTROINTESTINAL ENDOSCOPY  2010    [facility]    URETEROSCOPY      with cystoscopy        Social History     Occupational History    Occupation: HOMEMAKER   Tobacco Use    Smoking status: Former     Packs/day: 1.00     Years: 20.00     Pack years: 20.00     Types: Cigarettes     Start date: 10/1/1984     Quit date: 2005     Years since quittin.8    Smokeless tobacco: Never   Vaping Use    Vaping Use: Never used   Substance and Sexual Activity    Alcohol use: Yes     Alcohol/week: 1.0 standard drink     Types: 1 Cans of beer per week     Comment: social drinker    Drug use: No    Sexual activity: Yes     Partners: Male     Birth control/protection: Post-menopausal      Social History     Social History Narrative    LIVES WITH SPOUSE        Family History   Problem Relation Age of Onset    Hodgkin's lymphoma Mother     Migraines Mother     Thyroid disease Mother     Cancer Mother         Hodgkin's Lymphoma    Early death Mother         age 53    Hypertension Father     Depression Father     Atrial fibrillation Father     Anxiety disorder Father     Arthritis Father         Osteoarthritis    Cancer Father         CLL    Hearing loss Father         age related hearing loss    Heart disease Father         atrial fib    Hyperlipidemia Father     Other Father         spinal myelopathy    Migraines Sister     Hyperlipidemia Sister     Cardiomyopathy Brother         resolved    Lymphoma Son         Tcell    Cancer Son         T-Cell Lymphoma    Hearing loss Son         congenital sensorineural  "   Hyperlipidemia Sister     Hyperlipidemia Brother     Thyroid disease Brother     Malig Hyperthermia Neg Hx        Review of Systems      Physical Exam: 58 y.o. female  General Appearance:    Alert, cooperative, in no acute distress                      Vitals:    08/30/23 0628   BP: 146/84   BP Location: Right arm   Patient Position: Sitting   Pulse: 62   Resp: 16   Temp: 98.1 °F (36.7 °C)   TempSrc: Oral   SpO2: 100%   Weight: 63.6 kg (140 lb 3.4 oz)   Height: 156.2 cm (61.5\")        Head:  Normocephalic, without obvious abnormality, atraumatic   Eyes:          Conjunctivae and sclerae normal, no pallor, corneas clear,    Ears:  Ears appear intact with no abnormalities noted   Throat: No oral lesions, no thrush, oral mucosa moist   Neck: No adenopathy, supple, trachea midline, no thyromegaly,    Back:   No kyphosis present, no scoliosis present, no skin lesions,      erythema or scars, no tenderness to percussion or                   palpation,range of motion normal   Lungs:   C respirations regular, even and                 unlabored    Heart:  Regular rhythm and normal rate               Chest Wall:  No abnormalities observed               Pulses: Pulses palpable and equal bilaterally   Skin: No bleeding, bruising or rash   Lymph nodes: No palpable adenopathy   Neurologic: Appears neurologic intact             Assessment:    Incomplete tear of right rotator cuff          Plan: All risks, benefits and alternatives were discussed.  Risks including but not exclusive to anesthetic complications, including death, MI, CVA, infection, bleeding DVT, PE,  fracture, residual pain and need for future surgery.  Patient understood all and agrees to proceed.              "

## 2023-08-30 NOTE — ANESTHESIA PREPROCEDURE EVALUATION
Anesthesia Evaluation     Patient summary reviewed and Nursing notes reviewed   history of anesthetic complications:  PONV  NPO Solid Status: > 8 hours  NPO Liquid Status: > 2 hours           Airway   Mallampati: II  TM distance: >3 FB  Neck ROM: full  No difficulty expected  Dental    (+) implants        Pulmonary - normal exam   (+) a smoker Former, asthma,  Cardiovascular - normal exam    ECG reviewed        Neuro/Psych  (+) headaches  GI/Hepatic/Renal/Endo    (+) GERD, renal disease, thyroid problem hypothyroidism    Musculoskeletal     (+) neck pain  Abdominal    Substance History      OB/GYN          Other   arthritis,     ROS/Med Hx Other: ú Left ventricular systolic function is normal. Calculated EF = 64%. Estimated EF was in agreement with the calculated EF. Estimated EF = 64%. Normal left ventricular cavity size and wall thickness noted. All left ventricular wall segments contract normally. Left ventricular diastolic function is normal.  ú The aortic valve is abnormal in structure. There is thickening of the aortic valve. No aortic valve regurgitation is present.  ú Trace tricuspid valve regurgitation is present. Estimated right ventricular systolic pressure from tricuspid regurgitation is normal (<35 mmHg). Calculated right ventricular systolic pressure from tricuspid regurgitation is 27 mmHg.  ú Mild mitral valve regurgitation is present.                    Anesthesia Plan    ASA 2     general     (With ISB for POPC PSR  PONV prophylaxis with subhypnotic propofol gtt    I have reviewed the patient's history and chart with the patient, including all pertinent laboratory results and imaging. I have explained the risks of anesthesia including but not limited to dental damage, corneal abrasion, nerve injury, MI, stroke, aspiration, and death. Patient has agreed to proceed.  .    Risks of peripheral nerve block were discussed with patient including but not limited to: inadequate block, bleeding, infection,  "persistent numbness or weakness, nerve damage, painful dysasthesia and need to protect limb while numb.      /84 (BP Location: Right arm, Patient Position: Sitting)   Pulse 62   Temp 36.7 øC (98.1 øF) (Oral)   Resp 16   Ht 156.2 cm (61.5\")   Wt 63.6 kg (140 lb 3.4 oz)   LMP  (LMP Unknown)   SpO2 100%   BMI 26.07 kg/mý   )  intravenous induction     Anesthetic plan, risks, benefits, and alternatives have been provided, discussed and informed consent has been obtained with: patient.      CODE STATUS:         "

## 2023-08-30 NOTE — OP NOTE
Shoulder Scope Decompression Operative Note      Facility: [facility]  Patient Name: Carolin Zapien  YOB: 1965  Date: 8/30/2023  Medical Record Number: [id number]      Pre-op Diagnosis:   Incomplete tear of right rotator cuff, unspecified whether traumatic [M75.111]    Post-Op Diagnosis Codes:     * Incomplete tear of right rotator cuff, unspecified whether traumatic [M75.111]  Degenerative superior labral tear, capsulitis, impingement    Procedure(s):  Right SHOULDER ARTHROSCOPY WITH DEBRIDEMENT OF LABRIUM, and ROTATOR CUFF, DECOMPRESSION, AND INJECTION IN JOINT with Marcaine and Depo-Medrol    Surgeon(s):  Rosaura Rushing MD    Anesthesia: General with Block  Anesthesiologist: Stephen aGllego MD  CRNA: Leyla Payne CRNA    Staff:   Circulator: Shaina Pepper RN  Scrub Person: Jackie Sheets  Vendor Representative: Mj Munoz  Assistant: Caitlin San CSA  Assistants : first Jose assist was used right case for proposition the patient on the table, Disick of extremity during the procedure and closure      Estimated Blood Loss: 5 g mL    Specimens:   * No orders in the log *     Drains: None    Findings: See Dictation    Complications: None    Indication for procedure:     This patient has had a several month history of right shoulder pain which has been unresponsive to conservative management. They have an MRI and an exam which are consistent with impingement and presents for arthroscopy. The patient understands all risks benefits and alternatives. Risks including but not exclusive to anesthetic complications including death, MI, CVA, as well as infection, bleeding, failure to relieve symptoms and need for future surgery. The patient understands these and agrees to proceed.      Description of procedure:       Patient was taken to the operating room. They were placed supine on the operating room table. After induction of adequate LMA anesthesia,  scalene nerve block and IV antibiotics, they underwent exam under anesthesia which demonstrated near full range of motion which was symmetric side to side. They were placed in the modified beachchair position all prominent areas well padded and head well stabilized. The arm was prepped and draped in the usual sterile fashion. Bony landmarks were demarcated and the joint was infiltrated with 30 mL of fluid. Standard posterior portal was made inferior and medial to the posterior lateral aspect of the acromion with an 11 blade. Blunt trocar penetrated into the joint, scope followed and our evaluation began.  She did have some capsulitis and also what appeared to be a degenerative superior labral tear which was small.  I did establish an anterior portal interval between the subscapularis and the biceps tendon with spinal needle localization direct visualization.  I gently braided superior labrum with the Apollo device and also I debrided some of the capsulitis as well.  The subscapularis biceps tendon and remaining labrum were normal.  Rotator cuff was normal from this vantage point.         I then exited the space and entered the subacromial space. I made an accessory lateral portal off the anterior lateral edge of the acromion placed the shaver and removed marked amount of thickened bursa. I delineated the anterior lateral edge of the acromion with the Apollo device and resected the large spur that was present. The rotator cuff demonstrated some mild fraying just under the edge of the acromion this was gently debrided with motorized shaver and the Apollo device. Everything was thoroughly irrigated, it was suctioned, the portals were closed with 3-0 nylon in interrupted fashion.  I then injected the joint with Marcaine and Depo-Medrol from the posterior aspect with a spinal needle sterile dressings and a sling were applied. The patient tolerated this well, was taken to recovery room in good condition.  All sponge and  needle counts were correct.      Date: 8/30/2023  Time: 08:58 EDT

## 2023-08-30 NOTE — ANESTHESIA POSTPROCEDURE EVALUATION
Patient: Carolin Zapien    Procedure Summary       Date: 08/30/23 Room / Location:  [facility] [address] [place] [address] [facility]    Anesthesia Start: 0757 Anesthesia Stop: 0859    Procedure: SHOULDER ARTHROSCOPY WITH DEBRIDEMENT OF LABRIUM, ROTATOR CUFF, DECOMPRESSION, AND INJECTION IN JOINT (Right: Shoulder) Diagnosis:       Incomplete tear of right rotator cuff, unspecified whether traumatic      (Incomplete tear of right rotator cuff, unspecified whether traumatic [M75.111])    Surgeons: Rosaura Rushing MD Provider: Stepehn Gallego MD    Anesthesia Type: general ASA Status: 2            Anesthesia Type: general    Vitals  Vitals Value Taken Time   /91 08/30/23 0930   Temp 36.2 øC (97.2 øF) 08/30/23 0930   Pulse 62 08/30/23 0932   Resp 16 08/30/23 0930   SpO2 99 % 08/30/23 0932   Vitals shown include unvalidated device data.        Post Anesthesia Care and Evaluation    Patient location during evaluation: bedside  Patient participation: complete - patient participated  Level of consciousness: awake and alert  Pain management: adequate    Airway patency: patent  Anesthetic complications: No anesthetic complications  PONV Status: controlled  Cardiovascular status: acceptable  Respiratory status: acceptable  Hydration status: acceptable

## 2023-08-30 NOTE — ANESTHESIA POSTPROCEDURE EVALUATION
Patient: Carolin Zapien    Procedure Summary       Date: 08/30/23 Room / Location:  [facility] [address] [place] [address] [facility]    Anesthesia Start: 0757 Anesthesia Stop: 0859    Procedure: SHOULDER ARTHROSCOPY WITH DEBRIDEMENT OF LABRIUM, ROTATOR CUFF, DECOMPRESSION, AND INJECTION IN JOINT (Right: Shoulder) Diagnosis:       Incomplete tear of right rotator cuff, unspecified whether traumatic      (Incomplete tear of right rotator cuff, unspecified whether traumatic [M75.111])    Surgeons: Rosaura Rushing MD Provider: Stephen Gallego MD    Anesthesia Type: general ASA Status: 2            Anesthesia Type: general    Vitals  Vitals Value Taken Time   /76 08/30/23 0915   Temp 36.4 øC (97.5 øF) 08/30/23 0857   Pulse 66 08/30/23 0919   Resp 14 08/30/23 0905   SpO2 100 % 08/30/23 0919   Vitals shown include unvalidated device data.        Post Anesthesia Care and Evaluation    Patient location during evaluation: bedside  Patient participation: complete - patient participated  Level of consciousness: awake and alert  Pain management: adequate    Airway patency: patent  Anesthetic complications: No anesthetic complications  PONV Status: controlled  Cardiovascular status: blood pressure returned to baseline and acceptable  Respiratory status: acceptable  Hydration status: acceptable

## 2023-08-30 NOTE — ANESTHESIA PROCEDURE NOTES
Airway  Urgency: elective    Date/Time: 8/30/2023 8:06 AM  Airway not difficult    General Information and Staff    Patient location during procedure: OR  CRNA/CAA: Leyla Payne CRNA    Indications and Patient Condition  Indications for airway management: airway protection    Preoxygenated: yes  Mask difficulty assessment: 1 - vent by mask    Final Airway Details  Final airway type: supraglottic airway      Successful airway: classic  Size 4     Number of attempts at approach: 1  Assessment: lips, teeth, and gum same as pre-op    Additional Comments  LMA placed easily.  Cuff MOP.

## 2023-09-01 ENCOUNTER — TELEPHONE (OUTPATIENT)
Dept: ORTHOPEDIC SURGERY | Facility: CLINIC | Age: 58
End: 2023-09-01

## 2023-09-01 NOTE — TELEPHONE ENCOUNTER
Can you check in with her and see if she feels like it is at least starting to wear off? Sometimes they may take just a little bit longer than 48 but it should be starting to wear off. Please let me know.

## 2023-09-01 NOTE — TELEPHONE ENCOUNTER
I think that is just fine. She may just metabolize the anesthetic a little bit slower and I suspect it will continue to wear off over the next 24-48 hours.

## 2023-09-01 NOTE — TELEPHONE ENCOUNTER
Hub staff attempted to follow warm transfer process and was unsuccessful     Caller: JARRELL    Relationship to patient: SELF     Best call back number:  [phone]    Patient is needing: PT CALLED IN STATING SHE HAD NERVE BLOCK DONE 8/30/23 MORNING , SHE STATES HER PAPERWORK SAYS IF IT HAS NOT WORN OFF WITHIN 48 HOURS TO CALL. SHE IS CALLING IN STATING IT HAS NOT WORN OFF YET , PLEASE ADVISE. TY

## 2023-09-07 NOTE — PROGRESS NOTES
Shoulder Scope follow Up 1st Visit      Patient: Carolin Zapien        YOB: 1965      Chief Complaints: shoulder pain right      History of Present Illness: Pt is here f/u shoulder arthroscopy on the right she is doing well states she is off her pain medicine minding her restrictions        Allergies:   Allergies   Allergen Reactions    Pantoprazole Hives    Sulfa Antibiotics Hives    Terbinafine Hives       Medications:   Home Medications:  Current Outpatient Medications on File Prior to Visit   Medication Sig    acetaminophen (TYLENOL) 500 MG tablet Take 1 tablet by mouth Every 6 (Six) Hours As Needed for Mild Pain.    azelastine (ASTELIN) 0.1 % nasal spray 1 spray 2 (Two) Times a Day As Needed.    EPINEPHrine (EPIPEN) 0.3 MG/0.3ML solution auto-injector injection     estradiol (ESTRACE) 0.1 MG/GM vaginal cream Insert 2 g into the vagina 2 (Two) Times a Week.    estradiol (VIVELLE-DOT) 0.025 MG/24HR patch Place 1 patch on the skin as directed by provider 2 (Two) Times a Week.    fexofenadine (ALLEGRA) 180 MG tablet Take 1 tablet by mouth As Needed.    fluticasone (FLONASE) 50 MCG/ACT nasal spray 2 sprays into the nostril(s) as directed by provider Every Morning.    HYDROcodone-acetaminophen (NORCO) 5-325 MG per tablet Take 1 tablet by mouth Every 4 (Four) Hours As Needed for Severe Pain.    ibuprofen (ADVIL,MOTRIN) 200 MG tablet Take 1 tablet by mouth Every 6 (Six) Hours As Needed for Mild Pain. HOLD PRIOR TO SURG    levothyroxine (SYNTHROID, LEVOTHROID) 100 MCG tablet TAKE ONE TABLET BY MOUTH DAILY    Misc Natural Products (OSTEO BI-FLEX TRIPLE STRENGTH) tablet HOLD PRIOR TO SURG    ondansetron (Zofran) 4 MG tablet Take 1 tablet by mouth Every 8 (Eight) Hours As Needed for Nausea or Vomiting.    Probiotic Product (PROBIOTIC DAILY PO) Take 1 tablet by mouth Daily.    progesterone (PROMETRIUM) 100 MG capsule Take 1 capsule by mouth Every Night.    pseudoephedrine (SUDAFED) 120 MG 12 hr tablet Take  1 tablet by mouth Every 12 (Twelve) Hours As Needed.    Turmeric (QC TUMERIC COMPLEX PO) Take  by mouth. HOLD PRIOR TO SURG    Vitamin D, Cholecalciferol, 50 MCG (2000 UT) capsule Take 1 capsule by mouth Daily.     No current facility-administered medications on file prior to visit.     Current Medications:  Scheduled Meds:  Continuous Infusions:No current facility-administered medications for this visit.    PRN Meds:.          Physical Exam: 58 y.o. female  General Appearance:    Alert, cooperative, in no acute distress                 There were no vitals filed for this visit.   Patient is alert and oriented ×3 no acute distress normal mood physical exam.  Physical exam of the shoulder, incisions looked good there is no erythema,no signs or sx of infection.    Assessment  S/P shoulder scope.  I did review intraoperative findings and arthroscopic pictures with the patient.          Plan: To remove sutures today place Steri-Strips and start into  physical therapy and I will have thrm follow up in 4 weeks.             Answers submitted by the patient for this visit:  Other (Submitted on 9/4/2023)  Please describe your symptoms.: post op  Have you had these symptoms before?: No  How long have you been having these symptoms?: 5-7 days  Primary Reason for Visit (Submitted on 9/4/2023)  What is the primary reason for your visit?: Other

## 2023-09-11 ENCOUNTER — OFFICE VISIT (OUTPATIENT)
Dept: ORTHOPEDIC SURGERY | Facility: CLINIC | Age: 58
End: 2023-09-11
Payer: COMMERCIAL

## 2023-09-11 VITALS — HEIGHT: 61 IN | WEIGHT: 138 LBS | TEMPERATURE: 98.2 F | BODY MASS INDEX: 26.06 KG/M2

## 2023-09-11 DIAGNOSIS — Z98.890 S/P ARTHROSCOPY OF SHOULDER: Primary | ICD-10-CM

## 2023-09-13 ENCOUNTER — TREATMENT (OUTPATIENT)
Age: 58
End: 2023-09-13
Payer: COMMERCIAL

## 2023-09-13 DIAGNOSIS — Z98.890 S/P ARTHROSCOPY OF RIGHT SHOULDER: Primary | ICD-10-CM

## 2023-09-13 NOTE — PATIENT INSTRUCTIONS
Access Code: [email or web address]  URL: [email or web address]  Date: 09/13/2023  Prepared by: Renee Beard    Exercises  - Seated Shoulder Scaption AAROM with Pulley at Side  - 3 x daily - 7 x weekly - 3 sets - 10 reps  - Seated Shoulder Flexion Towel Slide at Table Top  - 3 x daily - 7 x weekly - 1 sets - 10 reps  - Standing Shoulder Abduction ROM with Dowel (Mirrored)  - 3 x daily - 7 x weekly - 1 sets - 10 reps  - Standing Anatomical Position with Scapular Retraction and Depression at Wall  - 3 x daily - 7 x weekly - 1 sets - 10 reps   Anesthesia ROS/Med Hx    Overall Review:  Pts.stress test was reviewed     Pulmonary Review:  Pulmonary Comments: Nodule of left lung  The patient is a current smoker.     Cardiovascular Review:    Exercise tolerance: good  Pt. positive for hypertension  Pt. positive for hyperlipidemia    End/Other Review:    Pt. positive for diabetes - type 2  Overall Review of Systems Comments:  Stress 2019  The SPECT images demonstrate no fixed or reversible perfusion defect. The  gated images demonstrate good wall motion and thickening throughout the  left ventricle. The left ventricular ejection fraction is 68%.      Anesthesia Plan     ASA Status: 2  Anesthesia Type: General  Induction: Intravenous  Preferred Airway Type: ETT  Reviewed: Medications, Nursing Notes, Patient Summary, NPO Status, Problem List, Past Med History, Allergies and Beta Blocker Status  The proposed anesthetic plan, including its risks and benefits, have been discussed with the Patient - along with the risks and benefits of alternatives.  Questions were encouraged and answered and the patient and/or representative agrees to proceed.  Informed Consent for Blood: Consented  Plan Comments: Dilaudid ordered pre op      Physical Exam  Mallampati: II  TM Distance: >3 FB  Neck ROM: Full  Cardio Rhythm: Regular  Cardio Rate: Normal  pulmonary exam normal  abdominal exam normal  Patient has:  Lower dentures

## 2023-09-13 NOTE — PROGRESS NOTES
Physical Therapy Initial Evaluation and Plan of Care  [facility]   [address] [place] [address]  P: [phone]       F: [phone]       Patient: Carolin Zapien   : 1965  Visit Diagnoses:     ICD-10-CM ICD-9-CM   1. S/P arthroscopy of right shoulder  Z98.890 V45.89     Referring practitioner: Rosaura Rushing MD  Date of Initial Visit: 2023  Today's Date: 2023  Patient seen for 1 sessions           Subjective Questionnaire: QuickDASH: 65.91%      Subjective Evaluation    History of Present Illness  Date of surgery: 2023  Mechanism of injury: Patient reports insidious onset of right shoulder pain.  Had scope recently due to partial RCT.  Reports the block from surgery did not wear off for 4 days, did not have much pain following surgery.    Currently off work, will be off for about another 4 wks.  Just got released from using the sling-use as needed for relief.      PMH: partial right RCT,     Copied from EMR  Cardiac and Vasculature    Raynaud disease      Endocrine and Metabolic    Adult hypothyroidism    Avitaminosis D      Gastrointestinal Abdominal    Gastroesophageal reflux disease without esophagitis      Musculoskeletal and Injuries    Bilateral occipital neuralgia    Chronic neck pain      Neuro    Degeneration of intervertebral disc at C5-C6 level      Pulmonary and Pneumonias    RAD (reactive airway disease)          Subjective comment: Patient reports recent right shoulder surgery.  Patient Occupation: Research assistance-primarily computer based work. Pain  At best pain ratin  At worst pain rating: 3  Location: Right shoulder  Quality: dull ache  Relieving factors: ice (advil/tylenol prn)  Aggravating factors: sleeping  Progression: improved    Social Support  Lives in: multiple-level home  Lives with: spouse    Hand dominance: right    Patient Goals  Patient goals for therapy: decreased pain, increased strength,  increased motion, independence with ADLs/IADLs and return to sport/leisure activities           Objective          Postural Observations    Additional Postural Observation Details  Right shoulder girdle guarding.    Observations     Right Shoulder  Positive for edema and incision. Negative for drainage.       Palpation     Right   Hypertonic in the upper trapezius. Tenderness of the upper trapezius.   Trigger point to upper trapezius.     Tenderness     Right Shoulder  Tenderness in the AC joint, acromion, bicipital groove and subacromial bursa.     Active Range of Motion     Right Shoulder   Flexion: 50 degrees with pain  Extension: 0 degrees with pain  Abduction: 50 degrees with pain  External rotation 0°: 0 degrees with pain  Internal rotation 0°: 50 degrees     Scapular Mobility     Right Shoulder   Scapular mobility: poor  Scapular Mobility with Shoulder to 90° FF   Scapular winging: moderate    Strength/Myotome Testing     Additional Strength Details  Deferred due to post-op status.      Tests     Additional Tests Details  Deferred due to post-op status.        Assessment & Plan       Assessment  Impairments: abnormal muscle tone, abnormal or restricted ROM, activity intolerance, impaired physical strength, lacks appropriate home exercise program and pain with function   Functional limitations: carrying objects, lifting, sleeping, pulling, pushing, uncomfortable because of pain, moving in bed, reaching behind back and reaching overhead   Assessment details: Carolin Zapien is a pleasant 58 y.o. female that presents with signs and symptoms consistent with the above diagnosis. She has decreased right shoulder ROM, decreased strength, post-op pain and muscle guarding, impaired scapulo thoracic rhythm, and impaired functional use of right UE.  Pt will benefit from skilled PT services in order to address listed impairments, decrease pain and restore function.      Prognosis: good  Prognosis details: Patient  demonstrates good rehab potential as evidenced by high motivation to participate with PT POC and to return to PLOF/ADLs/IADLs/work tasks.        Goals  Plan Goals: Short Term Goals (6 wks):  1. Patient to have increased right shoulder flexion and abduction to 170 degrees.  2. Patient will have increased right shoulder ER and IR to 70 degrees.  3. Patient will have increased increased right scapular mobility to WNL.  4. Patient will have increased GHJ mobility to WNL.    Long Term Goal (12 wks):  1.  Patient will have improved DASH score of 15% or less.  2.  Patient will have increased right shoulder strength to 4+/5.  3.  Patient will be independent in performance of HEP.  4.  Patient will be able to perform functional reach and lift tasks.      Plan  Therapy options: will be seen for skilled therapy services  Planned modality interventions: TENS, cryotherapy, thermotherapy (hydrocollator packs), dry needling and ultrasound  Planned therapy interventions: manual therapy, soft tissue mobilization, strengthening, stretching, functional ROM exercises, joint mobilization, home exercise program, motor coordination training, neuromuscular re-education, postural training, therapeutic activities, flexibility and body mechanics training  Frequency: 2x week  Duration in weeks: 12  Treatment plan discussed with: patient  Plan details: Pt was educated on the importance of their HEP and their current need for continued skilled physical therapy. Patients goals and potential limitations were discussed and pt is in agreement with current plan of care and treatment emphasis.              History # of Personal Factors and/or Comorbidities: HIGH (3+)  Examination of Body System(s): # of elements: LOW (1-2)  Clinical Presentation: STABLE   Clinical Decision Making: LOW       Timed:         Manual Therapy:         mins  63066;     Therapeutic Exercise:    25     mins  16394;     Neuromuscular Dawn:        mins  12452;    Therapeutic  Activity:     10     mins  11613;     Gait Training:           mins  09253;     Ultrasound:          mins  45753;    Ionto                                  mins  43783  Self Care                            mins  81023  Canalith Repos         mins  52509  Orthotic MGMT/Train         mins  45526    Un-Timed:  Electrical Stimulation:         mins  12409 ( );  Dry Needling:          mins  79636 self-pay;  Dry Needling:          mins  16717 self-pay  Traction          mins  58138  Low Eval     20     mins  72915  Mod Eval          mins  45364  High Eval                            mins  39516    Timed Treatment:   35   mins   Total Treatment:     60   mins      PT SIGNATURE: Renee Beard PT     License Number: [id number]  Electronically signed by Renee Beard PT, 09/13/23, 8:56 AM EDT      DATE TREATMENT INITIATED: 9/13/2023    Initial Certification  Certification Period: 9/13/2023 thru 12/11/2023  I certify that the therapy services are furnished while this patient is under my care.  The services outlined above are required by this patient, and will be reviewed every 90 days.     PHYSICIAN: Rosaura Rushing MD      NPI: [id number]  DATE:         Please sign and return via fax to [phone]. Thank you, [facility].

## 2023-09-15 ENCOUNTER — TREATMENT (OUTPATIENT)
Age: 58
End: 2023-09-15
Payer: COMMERCIAL

## 2023-09-15 DIAGNOSIS — Z98.890 S/P ARTHROSCOPY OF RIGHT SHOULDER: Primary | ICD-10-CM

## 2023-09-15 NOTE — PROGRESS NOTES
Physical Therapy Treatment Note  [facility] Physical Therapy [place]   [address] [place] [address]  P: [phone]       F: [phone]      Patient: Carolin Zapien   : 1965  Treatment Diagnosis:     ICD-10-CM ICD-9-CM   1. S/P arthroscopy of right shoulder  Z98.890 V45.89     Referring practitioner: Rosaura Rushing MD  Date of Initial Visit: Type: THERAPY  Noted: 2023  Today's Date: 9/15/2023  Patient seen for 2 sessions           Subjective   Patient reports her shoulder has been having cramping sensations and has been very sore.    Objective     See Exercise, Manual, and Modality Logs for complete treatment.       Assessment/Plan  Patient presents with continued right shoulder girdle muscle guarding and pain limited shoulder mobility.  She did tolerate manual therapy fairly well, had some discomfort although she did have improved mobility following.  Performed progressed exercise program.  Progressed HEP and provided written instructions for home use.  Progress per Plan of Care and Progress strengthening /stabilization /functional activity           Timed:         Manual Therapy:    15     mins  05701;     Therapeutic Exercise:    20     mins  60821;     Neuromuscular Dawn:        mins  26043;    Therapeutic Activity:          mins  25731;     Gait Training:           mins  45190;     Ultrasound:          mins  38434;    Ionto                                  mins  61996  Self Care                            mins  35012  Canalith Repos         mins  50206  Orthotic MGMT/Train         mins  76193    Un-Timed:  Electrical Stimulation:         mins  45159 (MC );  Dry Needling:          mins  11823 self-pay;  Dry Needling:          mins  71754 self-pay  Traction          mins  82398      Timed Treatment:   35   mins   Total Treatment:     45   mins        PT SIGNATURE: Renee Beard PT     License Number: [id number]  Electronically signed by Renee Beard PT,  09/15/23, 10:58 AM EDT

## 2023-09-19 ENCOUNTER — TREATMENT (OUTPATIENT)
Age: 58
End: 2023-09-19
Payer: COMMERCIAL

## 2023-09-19 DIAGNOSIS — Z98.890 S/P ARTHROSCOPY OF RIGHT SHOULDER: Primary | ICD-10-CM

## 2023-09-19 NOTE — PROGRESS NOTES
Physical Therapy Treatment Note  [facility] Physical Therapy [place]   [address] [place] [address]  P: [phone]       F: [phone]      Patient: Carolin Zapien   : 1965  Treatment Diagnosis:     ICD-10-CM ICD-9-CM   1. S/P arthroscopy of right shoulder  Z98.890 V45.89     Referring practitioner: Rosaura Rushing MD  Date of Initial Visit: Type: THERAPY  Noted: 2023  Today's Date: 2023  Patient seen for 3 sessions           Subjective   Patient reports her shoulder is feeling better.  States she has noticed that she has started to use her arm without thinking about it. Reports she was having a very difficult time with the wand ER exercises.    Objective     See Exercise, Manual, and Modality Logs for complete treatment.       Assessment/Plan  Patient responded positively to manual therapy with improved right shoulder and scapular mobility.  She performed program with progressed exercises with no adverse symptoms.  Modified wand ER to standing verses supine to allow for improved comfort.  Benefits from cueing for technique and to prevent compensatory patterns.  Will continue to progress as tolerated.  Progress per Plan of Care and Progress strengthening /stabilization /functional activity           Timed:         Manual Therapy:    15     mins  30758;     Therapeutic Exercise:    15     mins  25235;     Neuromuscular Dawn:    10    mins  31142;    Therapeutic Activity:          mins  63656;     Gait Training:           mins  35513;     Ultrasound:          mins  70633;    Ionto                                  mins  28120  Self Care                            mins  72531  Canalith Repos         mins  94613  Orthotic MGMT/Train         mins  67088    Un-Timed:  Electrical Stimulation:         mins  90583 ( );  Dry Needling:          mins  85774 self-pay;  Dry Needling:          mins  82226 self-pay  Traction          mins  49348      Timed Treatment:   40    mins   Total Treatment:     40   mins        PT SIGNATURE: Renee Beard, PT     License Number: [id number]  Electronically signed by Renee Beard, PT, 09/19/23, 12:02 PM EDT

## 2023-09-22 ENCOUNTER — TREATMENT (OUTPATIENT)
Age: 58
End: 2023-09-22
Payer: COMMERCIAL

## 2023-09-22 DIAGNOSIS — Z98.890 S/P ARTHROSCOPY OF RIGHT SHOULDER: Primary | ICD-10-CM

## 2023-09-22 NOTE — PROGRESS NOTES
Physical Therapy Treatment Note  [facility] Physical Therapy [place]   [address] [place] [address]  P: [phone]       F: [phone]      Patient: Carolin Zapien   : 1965  Treatment Diagnosis:     ICD-10-CM ICD-9-CM   1. S/P arthroscopy of right shoulder  Z98.890 V45.89     Referring practitioner: Rosaura Rushing MD  Date of Initial Visit: Type: THERAPY  Noted: 2023  Today's Date: 2023  Patient seen for 4 sessions           Subjective   Patient reports her shoulder has been sore and she has also been having pain in the top of her shoulder up to her neck.  States she has a history of neck pain.  States she has some questions on a few of the home exercises as she is not sure she was doing them correctly.      Objective     See Exercise, Manual, and Modality Logs for complete treatment.       Assessment/Plan  Reviewed and corrected technique with exercises.  Instructed  in technique as well and how to assist with preventing compensatory patterns while doing the HEP.  She performed program to tolerance with improved comfort.  She responded positively to manual therapy with improved shoulder mobility and decreased muscle guarding and discomfort.  Will continue to progress as tolerated.  Progress per Plan of Care and Progress strengthening /stabilization /functional activity           Timed:         Manual Therapy:    15     mins  19918;     Therapeutic Exercise:    15     mins  42026;     Neuromuscular Dawn:    8    mins  67459;    Therapeutic Activity:          mins  99737;     Gait Training:           mins  56509;     Ultrasound:          mins  50329;    Ionto                                  mins  56644  Self Care                            mins  90269  Canalith Repos         mins  18406  Orthotic MGMT/Train         mins  15204    Un-Timed:  Electrical Stimulation:         mins  38144 ( );  Dry Needling:          mins  11063 self-pay;  Dry  Needling:          mins  07832 self-pay  Traction          mins  70511      Timed Treatment:   38   mins   Total Treatment:     38   mins        PT SIGNATURE: Renee Beard PT     License Number: [id number]  Electronically signed by Renee Beard PT, 09/22/23, 11:59 AM EDT

## 2023-09-25 ENCOUNTER — TREATMENT (OUTPATIENT)
Age: 58
End: 2023-09-25

## 2023-09-25 DIAGNOSIS — M25.511 ACUTE PAIN OF RIGHT SHOULDER: ICD-10-CM

## 2023-09-25 DIAGNOSIS — Z98.890 S/P ARTHROSCOPY OF RIGHT SHOULDER: Primary | ICD-10-CM

## 2023-09-25 DIAGNOSIS — M25.611 DECREASED ROM OF RIGHT SHOULDER: ICD-10-CM

## 2023-09-25 NOTE — PROGRESS NOTES
Physical Therapy Treatment Note  [facility] Physical Therapy [place]   [address] [place] [address]  P: [phone]       F: [phone]      Patient: Carolin Zapien   : 1965  Treatment Diagnosis:     ICD-10-CM ICD-9-CM   1. S/P arthroscopy of right shoulder  Z98.890 V45.89   2. Acute pain of right shoulder  M25.511 719.41   3. Decreased ROM of right shoulder  M25.611 719.51     Referring practitioner: Rosaura Rushing MD  Date of Initial Visit: Type: THERAPY  Noted: 2023  Today's Date: 2023  Patient seen for 5 sessions           Subjective   Patient reports she is having less pain in her shoulder since last session.  States she was able to hook her bra but still can't putt her hair in a ponytail.      Objective     See Exercise, Manual, and Modality Logs for complete treatment.       Assessment/Plan  Patient responded positively to manual therapy with improved muscle tone and decreased soft tissue restrictions during functional movement patterns.  Progressed HEP.  Progress per Plan of Care and Progress strengthening /stabilization /functional activity           Timed:         Manual Therapy:    15     mins  78471;     Therapeutic Exercise:    10     mins  75217;     Neuromuscular Dawn:    8    mins  39241;    Therapeutic Activity:          mins  24833;     Gait Training:           mins  50167;     Ultrasound:          mins  01865;    Ionto                                  mins  72730  Self Care                            mins  08523  Canalith Repos         mins  49897  Orthotic MGMT/Train         mins  30854    Un-Timed:  Electrical Stimulation:         mins  47488 ( );  Dry Needling:          mins  08231 self-pay;  Dry Needling:          mins  81866 self-pay  Traction          mins  89592      Timed Treatment:   33   mins   Total Treatment:     45   mins        PT SIGNATURE: Renee Beard PT     License Number: [id number]  Electronically signed by Renee HOPE  Chirag PT, 09/25/23, 11:40 AM EDT

## 2023-09-28 ENCOUNTER — TREATMENT (OUTPATIENT)
Age: 58
End: 2023-09-28
Payer: COMMERCIAL

## 2023-09-28 DIAGNOSIS — M25.611 DECREASED ROM OF RIGHT SHOULDER: ICD-10-CM

## 2023-09-28 DIAGNOSIS — M25.511 ACUTE PAIN OF RIGHT SHOULDER: ICD-10-CM

## 2023-09-28 DIAGNOSIS — Z98.890 S/P ARTHROSCOPY OF RIGHT SHOULDER: Primary | ICD-10-CM

## 2023-09-28 NOTE — PROGRESS NOTES
Physical Therapy Treatment Note  [facility] Physical Therapy [place]   [address] [place] [address]  P: [phone]       F: [phone]      Patient: Carolin Zapien   : 1965  Treatment Diagnosis:     ICD-10-CM ICD-9-CM   1. S/P arthroscopy of right shoulder  Z98.890 V45.89   2. Acute pain of right shoulder  M25.511 719.41   3. Decreased ROM of right shoulder  M25.611 719.51     Referring practitioner: Rosaura Rushing MD  Date of Initial Visit: Type: THERAPY  Noted: 2023  Today's Date: 2023  Patient seen for 6 sessions           Subjective   Patient reports her shoulder continues to be sore.  Reports some of the stretches are stirring up her neck pain.    Objective     See Exercise, Manual, and Modality Logs for complete treatment.       Assessment/Plan  Patient responded positively to manual therapy with improved muscle tone, decreased shoulder girdle guarding and decreased pain with GHJ mobility.  Her ROM is progressing although she continues to have significant muscle dysfunction/imbalance.  Modified IR stretch to SL to ease neck pain.  Progress per Plan of Care and Progress strengthening /stabilization /functional activity           Timed:         Manual Therapy:    20     mins  59481;     Therapeutic Exercise:    10     mins  39743;     Neuromuscular Dawn:    8    mins  28747;    Therapeutic Activity:          mins  63216;     Gait Training:           mins  09695;     Ultrasound:          mins  73364;    Ionto                                  mins  71985  Self Care                            mins  55860  Canalith Repos         mins  28480  Orthotic MGMT/Train         mins  47573    Un-Timed:  Electrical Stimulation:         mins  27354 ( );  Dry Needling:          mins  95048 self-pay;  Dry Needling:          mins  03870 self-pay  Traction          mins  26972      Timed Treatment:   38   mins   Total Treatment:     38   mins        PT SIGNATURE: Renee  HERMINIA Beard PT     License Number: [id number]  Electronically signed by Renee Beard PT, 09/28/23, 2:48 PM EDT

## 2023-10-02 ENCOUNTER — TREATMENT (OUTPATIENT)
Age: 58
End: 2023-10-02
Payer: COMMERCIAL

## 2023-10-02 DIAGNOSIS — M25.511 ACUTE PAIN OF RIGHT SHOULDER: ICD-10-CM

## 2023-10-02 DIAGNOSIS — M25.611 DECREASED ROM OF RIGHT SHOULDER: ICD-10-CM

## 2023-10-02 DIAGNOSIS — Z98.890 S/P ARTHROSCOPY OF RIGHT SHOULDER: Primary | ICD-10-CM

## 2023-10-02 PROCEDURE — 97140 MANUAL THERAPY 1/> REGIONS: CPT | Performed by: PHYSICAL THERAPIST

## 2023-10-02 PROCEDURE — 97110 THERAPEUTIC EXERCISES: CPT | Performed by: PHYSICAL THERAPIST

## 2023-10-02 PROCEDURE — 97112 NEUROMUSCULAR REEDUCATION: CPT | Performed by: PHYSICAL THERAPIST

## 2023-10-02 NOTE — PROGRESS NOTES
Physical Therapy Treatment Note  [facility] Physical Therapy [place]   [address] [place] [address]  P: [phone]       F: [phone]      Patient: Carolin Zapien   : 1965  Treatment Diagnosis:     ICD-10-CM ICD-9-CM   1. S/P arthroscopy of right shoulder  Z98.890 V45.89   2. Acute pain of right shoulder  M25.511 719.41   3. Decreased ROM of right shoulder  M25.611 719.51     Referring practitioner: Rosaura Rushing MD  Date of Initial Visit: Type: THERAPY  Noted: 2023  Today's Date: 10/2/2023  Patient seen for 7 sessions           Subjective   Patient reports she walked around the art fair in [place], [place] this weekend and had some increased shoulder pain which that.      Objective     See Exercise, Manual, and Modality Logs for complete treatment.       Assessment/Plan  Patient had improved ROM and muscle guarding following manual therapy.  Her end range discomfort is also improving.  She was able to perform advanced exercises without adverse pain provocation.    Progress per Plan of Care and Progress strengthening /stabilization /functional activity           Timed:         Manual Therapy:    10    mins  32391;     Therapeutic Exercise:    20     mins  02880;     Neuromuscular Dawn:    8    mins  29138;    Therapeutic Activity:          mins  40554;     Gait Training:           mins  72576;     Ultrasound:          mins  54185;    Ionto                                  mins  57191  Self Care                            mins  93697  Canalith Repos         mins  17467  Orthotic MGMT/Train         mins  36083    Un-Timed:  Electrical Stimulation:         mins  00944 ( );  Dry Needling:          mins  39421 self-pay;  Dry Needling:          mins  58939 self-pay  Traction          mins  63508      Timed Treatment:   38   mins   Total Treatment:     38   mins        PT SIGNATURE: Renee Beard PT     License Number: [id number]  Electronically signed by Renee HOPE  Chirag PT, 10/02/23, 10:13 AM EDT

## 2023-10-05 ENCOUNTER — TREATMENT (OUTPATIENT)
Age: 58
End: 2023-10-05
Payer: COMMERCIAL

## 2023-10-05 DIAGNOSIS — Z98.890 S/P ARTHROSCOPY OF RIGHT SHOULDER: Primary | ICD-10-CM

## 2023-10-05 DIAGNOSIS — M25.611 DECREASED ROM OF RIGHT SHOULDER: ICD-10-CM

## 2023-10-05 DIAGNOSIS — M25.511 ACUTE PAIN OF RIGHT SHOULDER: ICD-10-CM

## 2023-10-05 NOTE — PROGRESS NOTES
Physical Therapy Treatment Note  [facility] Physical Therapy [place]   [address] [place] [address]  P: [phone]       F: [phone]      Patient: Carolin Zapien   : 1965  Treatment Diagnosis:     ICD-10-CM ICD-9-CM   1. S/P arthroscopy of right shoulder  Z98.890 V45.89   2. Acute pain of right shoulder  M25.511 719.41   3. Decreased ROM of right shoulder  M25.611 719.51     Referring practitioner: Rosaura Rushing MD  Date of Initial Visit: Type: THERAPY  Noted: 2023  Today's Date: 10/5/2023  Patient seen for 8 sessions           Subjective   Patient reports she has been doing more walking and her shoulder gets sore and achy.  Still having difficulty with reaching to pull her hair back.    Objective     See Exercise, Manual, and Modality Logs for complete treatment.       Assessment/Plan  Patient responded positively to manual therapy with improved right shoulder ROM and scapular mobility.  Continues to have pectoral and upper trapezius dominant movement patterns.  Improved with facilitory retraining techniques.  Progress per Plan of Care and Progress strengthening /stabilization /functional activity           Timed:         Manual Therapy:    15     mins  05820;     Therapeutic Exercise:    15     mins  23763;     Neuromuscular Dawn:    8    mins  44342;    Therapeutic Activity:          mins  09494;     Gait Training:           mins  92794;     Ultrasound:          mins  08249;    Ionto                                  mins  50934  Self Care                            mins  63048  Canalith Repos         mins  07014  Orthotic MGMT/Train         mins  62339    Un-Timed:  Electrical Stimulation:         mins  70399 ( );  Dry Needling:          mins  06365 self-pay;  Dry Needling:          mins  19099 self-pay  Traction          mins  37393      Timed Treatment:   38   mins   Total Treatment:     38   mins        PT SIGNATURE: Renee Beard, PT     License  Number: [id number]  Electronically signed by Renee Beard PT, 10/05/23, 2:00 PM EDT

## 2023-10-06 NOTE — PROGRESS NOTES
Patient: Carolin Zapien  YOB: 1965  Date of Service: 10/6/2023    Chief Complaints:   Right shoulder pain  Subjective:    History of Present Illness: Pt is seen in the office today with complaints of right shoulder pain she is status post shoulder arthroscopy she is doing well states her symptoms continue to improve she still mildly limited on range of motion I suspect that is a big part of her symptoms.  She is progressing well with physical therapy        Allergies:   Allergies   Allergen Reactions    Pantoprazole Hives    Sulfa Antibiotics Hives    Terbinafine Hives       Medications:   Home Medications:  Current Outpatient Medications on File Prior to Visit   Medication Sig    acetaminophen (TYLENOL) 500 MG tablet Take 1 tablet by mouth Every 6 (Six) Hours As Needed for Mild Pain.    azelastine (ASTELIN) 0.1 % nasal spray 1 spray 2 (Two) Times a Day As Needed.    EPINEPHrine (EPIPEN) 0.3 MG/0.3ML solution auto-injector injection     estradiol (ESTRACE) 0.1 MG/GM vaginal cream Insert 2 g into the vagina 2 (Two) Times a Week.    estradiol (VIVELLE-DOT) 0.025 MG/24HR patch Place 1 patch on the skin as directed by provider 2 (Two) Times a Week.    fexofenadine (ALLEGRA) 180 MG tablet Take 1 tablet by mouth As Needed.    fluticasone (FLONASE) 50 MCG/ACT nasal spray 2 sprays into the nostril(s) as directed by provider Every Morning.    HYDROcodone-acetaminophen (NORCO) 5-325 MG per tablet Take 1 tablet by mouth Every 4 (Four) Hours As Needed for Severe Pain.    ibuprofen (ADVIL,MOTRIN) 200 MG tablet Take 1 tablet by mouth Every 6 (Six) Hours As Needed for Mild Pain. HOLD PRIOR TO SURG    levothyroxine (SYNTHROID, LEVOTHROID) 100 MCG tablet TAKE ONE TABLET BY MOUTH DAILY    Misc Natural Products (OSTEO BI-FLEX TRIPLE STRENGTH) tablet HOLD PRIOR TO SURG    ondansetron (Zofran) 4 MG tablet Take 1 tablet by mouth Every 8 (Eight) Hours As Needed for Nausea or Vomiting.    Probiotic Product (PROBIOTIC  DAILY PO) Take 1 tablet by mouth Daily.    progesterone (PROMETRIUM) 100 MG capsule Take 1 capsule by mouth Every Night.    pseudoephedrine (SUDAFED) 120 MG 12 hr tablet Take 1 tablet by mouth Every 12 (Twelve) Hours As Needed.    Turmeric (QC TUMERIC COMPLEX PO) Take  by mouth. HOLD PRIOR TO SURG    Vitamin D, Cholecalciferol, 50 MCG (2000 UT) capsule Take 1 capsule by mouth Daily.     No current facility-administered medications on file prior to visit.     Current Medications:  Scheduled Meds:  Continuous Infusions:No current facility-administered medications for this visit.    PRN Meds:.    I have reviewed the patient's medical history in detail and updated the computerized patient record.  Review and summarization of old records include:    Past Medical History:   Diagnosis Date    Allergy-induced asthma     Arthritis of neck 1998    Degenerative cervical disk disease    Bursitis of hip 2020    saw Dr. Leonardo for left hip pain    Cataract     marial  premature    Colon polyp August 2020    [facility]    DDD (degenerative disc disease), cervical     Diverticulitis of colon 9/23/2010    colonoscopy indicated mild case    Diverticulosis 2010    mild    Hypothyroidism 2010    Kidney cyst, acquired 10/24/2015    Migraines     RARELY    Multiple joint pain 07/18/2013    Rash     UPPER RIGHT CHEST INSTRUCTED PT TO NOTIFY DR RUSHING IF NOT GONE BY DAY OF SURGERY    Raynaud's disease     Right shoulder pain     Rotator cuff syndrome August 2022    see Dr. Rushing for this    Seasonal allergies     Seborrheic dermatitis         Past Surgical History:   Procedure Laterality Date    CHOLECYSTECTOMY N/A 12/29/2017    Procedure: CHOLECYSTECTOMY LAPAROSCOPIC;  Surgeon: Mc Gonzalez MD;  [place] [facility] [place] [facility] [place]:     COLONOSCOPY N/A 08/21/2020    Procedure: COLONOSCOPY TO CECUM AND TERMINAL ILEUM WITH COLD SNARE POLYPECTOMIES;  Surgeon: Ganesh Davison MD;  Location: [facility] ENDOSCOPY;  Service: Gastroenterology;  Laterality:  N/A;  PRE- SCREENING  POST- NORMAL TI, COLON POLYPS, DIVERTICULOSIS, INTERNAL HEMORRHOIDS    COLPOSCOPY      WITH BX; RESOLVED    DILATATION AND CURETTAGE      ENDOSCOPY N/A 2017    Procedure: ESOPHAGOGASTRODUODENOSCOPY with biopsies;  Surgeon: Ganesh Davison MD;  Location: [facility] ENDOSCOPY;  Service:     ENDOSCOPY AND COLONOSCOPY      GUM SURGERY      LEFT UPPER JAW    SEPTOPLASTY, TURBINECTOMY, ENDOSCOPIC MAXILLARY ANTROSTOMY      [place]    SHOULDER ARTHROSCOPY Right 2023    Procedure: SHOULDER ARTHROSCOPY WITH DEBRIDEMENT OF LABRIUM, ROTATOR CUFF, DECOMPRESSION, AND INJECTION IN JOINT;  Surgeon: Rosaura Rushing MD;  [place] [facility] [place]  Service: [place]opedics;  Laterality: Right;    TONSILLECTOMY      TRIGGER POINT INJECTION      Cervical branch block    UPPER GASTROINTESTINAL ENDOSCOPY  2010    [facility]    URETEROSCOPY      with cystoscopy        Social History     Occupational History    Occupation: HOMEMAKER   Tobacco Use    Smoking status: Former     Packs/day: 1.00     Years: 20.00     Pack years: 20.00     Types: Cigarettes     Start date: 10/1/1984     Quit date: 2005     Years since quittin.9    Smokeless tobacco: Never   Vaping Use    Vaping Use: Never used   Substance and Sexual Activity    Alcohol use: Yes     Alcohol/week: 1.0 standard drink     Types: 1 Cans of beer per week     Comment: social drinker    Drug use: No    Sexual activity: Yes     Partners: Male     Birth control/protection: Post-menopausal      Social History     Social History Narrative    LIVES WITH SPOUSE        Family History   Problem Relation Age of Onset    Hodgkin's lymphoma Mother     Migraines Mother     Thyroid disease Mother     Cancer Mother         Hodgkin's Lymphoma    Early death Mother         age 53    Hypertension Father     Depression Father     Atrial fibrillation Father     Anxiety disorder Father     Arthritis Father         Osteoarthritis    Cancer Father         CLL     Hearing loss Father         age related hearing loss    Heart disease Father         atrial fib    Hyperlipidemia Father     Other Father         spinal myelopathy    Migraines Sister     Hyperlipidemia Sister     Cardiomyopathy Brother         resolved    Lymphoma Son         Tcell    Cancer Son         T-Cell Lymphoma    Hearing loss Son         congenital sensorineural    Hyperlipidemia Sister     Hyperlipidemia Brother     Thyroid disease Brother     Malig Hyperthermia Neg Hx        ROS: 14 point review of systems was performed and was negative except for documented findings in HPI and today's encounter.     Allergies:   Allergies   Allergen Reactions    Pantoprazole Hives    Sulfa Antibiotics Hives    Terbinafine Hives     Constitutional:  Denies fever, shaking or chills   Eyes:  Denies change in visual acuity   HENT:  Denies nasal congestion or sore throat   Respiratory:  Denies cough or shortness of breath   Cardiovascular:  Denies chest pain or severe LE edema   GI:  Denies abdominal pain, nausea, vomiting, bloody stools or diarrhea   Musculoskeletal:  Numbness, tingling, or loss of motor function only as noted above in history of present illness.  : Denies painful urination or hematuria  Integument:  Denies rash, lesion or ulceration   Neurologic:  Denies headache or focal weakness  Endocrine:  Denies lymphadenopathy  Psych:  Denies confusion or change in mental status   Hem:  Denies active bleeding      Physical Exam: 58 y.o. female  Wt Readings from Last 3 Encounters:   09/11/23 62.6 kg (138 lb)   08/30/23 63.6 kg (140 lb 3.4 oz)   08/23/23 63.6 kg (140 lb 4.8 oz)       There is no height or weight on file to calculate BMI.    There were no vitals filed for this visit.  Vital signs reviewed.   General Appearance:    Alert, cooperative, in no acute distress                    Ortho exam  Physical exam of the right shoulder reveals no overlying skin changes no lymphedema no lymphadenopathy.  Patient has  active flexion 175 with mild symptoms abduction is similar external rotation is to 45 and internal rotation to the mid lumbar spine with mild symptoms.  Patient has good rotator cuff strength 4+ over 5 with isometric strength testing with pain.  Patient has a positive impingement and a positive Khalil sign.  Patient has good cervical range of motion which is full and asymptomatic no radicular symptoms.  Patient has a normal elbow exam.  Good distal pulses are present  Patient has pain with overhead activity and a positive Neer sign and a positive empty can sign , a positive drop arm and a definitive painful arc                Assessment: Status post shoulder arthroscopy I think she is doing great I do think a lot of this is still related to her mild loss of range of motion I think her symptoms will improve if she continues to work on that we will have her continue see physical therapy she will start progressing her activities and I will see her back in 6 weeks    Plan:   Follow up as indicated.  Ice, elevate, and rest as needed.  Discussed conservative measures of pain control including ice, bracing.  Also talked about the importance of strengthening tobias Rushing M.D.        Answers submitted by the patient for this visit:  Other (Submitted on 10/6/2023)  Please describe your symptoms.: surgery follow-up  Have you had these symptoms before?: No  How long have you been having these symptoms?: Greater than 2 weeks  Primary Reason for Visit (Submitted on 10/6/2023)  What is the primary reason for your visit?: Other

## 2023-10-09 ENCOUNTER — OFFICE VISIT (OUTPATIENT)
Dept: ORTHOPEDIC SURGERY | Facility: CLINIC | Age: 58
End: 2023-10-09
Payer: COMMERCIAL

## 2023-10-09 VITALS — BODY MASS INDEX: 26.6 KG/M2 | WEIGHT: 140.9 LBS | HEIGHT: 61 IN | TEMPERATURE: 98.4 F

## 2023-10-09 DIAGNOSIS — Z98.890 S/P ARTHROSCOPY OF SHOULDER: Primary | ICD-10-CM

## 2023-10-12 ENCOUNTER — TREATMENT (OUTPATIENT)
Age: 58
End: 2023-10-12
Payer: COMMERCIAL

## 2023-10-12 DIAGNOSIS — M25.511 ACUTE PAIN OF RIGHT SHOULDER: ICD-10-CM

## 2023-10-12 DIAGNOSIS — Z98.890 S/P ARTHROSCOPY OF RIGHT SHOULDER: Primary | ICD-10-CM

## 2023-10-12 DIAGNOSIS — M25.611 DECREASED ROM OF RIGHT SHOULDER: ICD-10-CM

## 2023-10-12 NOTE — PROGRESS NOTES
Physical Therapy Re-Assessment   [facility] [place]   [address] [place] [address]  P: [phone]       F: [phone]        Patient: Carolin Zapien   : 1965  Visit Diagnoses:     ICD-10-CM ICD-9-CM   1. S/P arthroscopy of right shoulder  Z98.890 V45.89   2. Acute pain of right shoulder  M25.511 719.41   3. Decreased ROM of right shoulder  M25.611 719.51     Referring practitioner: Rosaura Rushing MD  Date of Initial Visit: Type: THERAPY  Noted: 2023  Today's Date: 10/12/2023  Patient seen for 9 sessions      Subjective:   Carolin Zapien reports:   Subjective Questionnaire: QuickDASH: 38.64%  Clinical Progress: improved  Home Program Compliance: Yes  Treatment has included: therapeutic exercise, neuromuscular re-education, manual therapy, therapeutic activity, and cryotherapy    Subjective   Patient reports she feels she has made some gains in the past week.  Still having soreness in her shoulder and having difficulty with upper body ADLs but all improving.     Objective     Active Range of Motion      Right Shoulder   Flexion: 140 degrees  Extension: 45 degrees   Abduction: 133 degrees   External rotation 90ø: 35 degrees   Internal rotation 90ø: 45 degrees      Scapular Mobility      Right Shoulder   Scapular mobility: fair  Scapular Mobility with Shoulder to 90ø FF   Scapular winging: mild     Strength/Myotome Testing     Right Shoulder   Flexion: 4  Extension: 4+  Abduction: 4-  External rotation: 4-  Internal rotation: 4     See Exercise, Manual, and Modality Logs for complete treatment.     Assessment/Plan  Patient presents with improving right shoulder ROM, scapular mobility and strength.  She still has muscle imbalance in the shoulder girdle and capsular tightness but she is improving with treatment.  Benefits form verbal and tactile cueing for technique and to prevent compensatory patterns.  Progress toward previous goals:  progressing    Goal Review  Short Term Goals (3 wks):  1. Patient to have increased right shoulder flexion and abduction to 170 degrees.-progressing  2. Patient will have increased right shoulder ER and IR to 70 degrees.-progressing  3. Patient will have increased increased right scapular mobility to WNL.-progressing  4. Patient will have increased GHJ mobility to WNL.-progressing     Long Term Goal (8 wks):  1.  Patient will have improved DASH score of 15% or less.-progressing  2.  Patient will have increased right shoulder strength to 4+/5.-progressing  3.  Patient will be independent in performance of HEP.-progressing  4.  Patient will be able to perform functional reach and lift tasks.-progressing         Recommendations: Continue as planned  Timeframe: 2 months  Prognosis to achieve goals: good    PT SIGNATURE: Renee Beard PT     License Number: [id number]  Electronically signed by Renee Beard PT, 10/12/23, 1:55 PM EDT          Timed:         Manual Therapy:    15     mins  19417;     Therapeutic Exercise:    10     mins  35278;     Neuromuscular Dawn:        mins  53246;    Therapeutic Activity:     10     mins  60757;     Gait Training:           mins  57410;     Ultrasound:          mins  12097;    Ionto                                  mins  90953  Self Care                            mins  73880  Canalith Repos         mins  82513  Orthotic MGMT/Train         mins  01623    Un-Timed:  Electrical Stimulation:         mins  83346 ( );  Dry Needling:          mins  88648 self-pay;  Dry Needling:          mins  73187 self-pay  Traction          mins  46346  Low Eval          mins  46915  Mod Eval          mins  48838  High Eval                            mins  18391    Timed Treatment:   35   mins   Total Treatment:     35   mins

## 2023-10-16 ENCOUNTER — TREATMENT (OUTPATIENT)
Age: 58
End: 2023-10-16
Payer: COMMERCIAL

## 2023-10-16 DIAGNOSIS — E03.9 ADULT HYPOTHYROIDISM: ICD-10-CM

## 2023-10-16 DIAGNOSIS — M25.611 DECREASED ROM OF RIGHT SHOULDER: ICD-10-CM

## 2023-10-16 DIAGNOSIS — M25.511 ACUTE PAIN OF RIGHT SHOULDER: ICD-10-CM

## 2023-10-16 DIAGNOSIS — Z98.890 S/P ARTHROSCOPY OF RIGHT SHOULDER: Primary | ICD-10-CM

## 2023-10-16 PROCEDURE — 97110 THERAPEUTIC EXERCISES: CPT | Performed by: PHYSICAL THERAPIST

## 2023-10-16 PROCEDURE — 97140 MANUAL THERAPY 1/> REGIONS: CPT | Performed by: PHYSICAL THERAPIST

## 2023-10-16 PROCEDURE — 97112 NEUROMUSCULAR REEDUCATION: CPT | Performed by: PHYSICAL THERAPIST

## 2023-10-16 RX ORDER — LEVOTHYROXINE SODIUM 0.1 MG/1
TABLET ORAL
Qty: 28 TABLET | Refills: 0 | Status: SHIPPED | OUTPATIENT
Start: 2023-10-16

## 2023-10-16 NOTE — PROGRESS NOTES
Physical Therapy Treatment Note  [facility] [place]   [address] [place] [address]  P: [phone]       F: [phone]      Patient: Carolin Zapien   : 1965  Treatment Diagnosis:     ICD-10-CM ICD-9-CM   1. S/P arthroscopy of right shoulder  Z98.890 V45.89   2. Acute pain of right shoulder  M25.511 719.41   3. Decreased ROM of right shoulder  M25.611 719.51     Referring practitioner: Rosaura Rushing MD  Date of Initial Visit: Type: THERAPY  Noted: 2023  Today's Date: 10/16/2023  Patient seen for 10 sessions           Subjective   Patient reports she is still having some soreness in her shoulder.  Has had a little more aching with the weather change.  States she has been having increased reaction with her Raynaud's when using the ice pack.    Objective     See Exercise, Manual, and Modality Logs for complete treatment.       Assessment/Plan  Patient performed program with progressed exercises.  She benefits from cueing for technique and to prevent compensatory patterns.  Continues to respond positively to manual therapy with improved right shoulder ROM and decreased end range discomfort.  Modified HEP for use of cold pack verse ice pack and to alternate with use of heat pack as well.  Progress per Plan of Care and Progress strengthening /stabilization /functional activity           Timed:         Manual Therapy:    15     mins  66323;     Therapeutic Exercise:    10     mins  96990;     Neuromuscular Dawn:    8    mins  04244;    Therapeutic Activity:          mins  76894;     Gait Training:           mins  02639;     Ultrasound:          mins  78733;    Ionto                                  mins  38968  Self Care                            mins  52136  Canalith Repos         mins  49451  Orthotic MGMT/Train         mins  64206    Un-Timed:  Electrical Stimulation:         mins  70521 ( );  Dry Needling:          mins  40602 self-pay;  Dry  Needling:          mins  06717 self-pay  Traction          mins  87265      Timed Treatment:   33   mins   Total Treatment:     33   mins        PT SIGNATURE: Renee Beard PT     License Number: [id number]  Electronically signed by Renee Beard PT, 10/16/23, 5:51 PM EDT

## 2023-10-19 ENCOUNTER — TREATMENT (OUTPATIENT)
Age: 58
End: 2023-10-19
Payer: COMMERCIAL

## 2023-10-19 DIAGNOSIS — M25.611 DECREASED ROM OF RIGHT SHOULDER: ICD-10-CM

## 2023-10-19 DIAGNOSIS — Z98.890 S/P ARTHROSCOPY OF RIGHT SHOULDER: Primary | ICD-10-CM

## 2023-10-19 DIAGNOSIS — M25.511 ACUTE PAIN OF RIGHT SHOULDER: ICD-10-CM

## 2023-10-19 NOTE — PROGRESS NOTES
Physical Therapy Treatment Note  [facility] [place]   [address] [place] [address]  P: [phone]       F: [phone]      Patient: Carolin Zapien   : 1965  Treatment Diagnosis:     ICD-10-CM ICD-9-CM   1. S/P arthroscopy of right shoulder  Z98.890 V45.89   2. Acute pain of right shoulder  M25.511 719.41   3. Decreased ROM of right shoulder  M25.611 719.51     Referring practitioner: Rosaura Rushing MD  Date of Initial Visit: Type: THERAPY  Noted: 2023  Today's Date: 10/19/2023  Patient seen for 11 sessions           Subjective   Patient reports her shoulder is sore and tired today.  States she has been doing a lot more work than usual.    Objective     See Exercise, Manual, and Modality Logs for complete treatment.       Assessment/Plan  Patient continues to respond positively to manual therapy with improved right shoulder ROM.  She still has capsular tightness and end range discomfort.  Will continue to progress as tolerated.  Progress per Plan of Care and Progress strengthening /stabilization /functional activity           Timed:         Manual Therapy:    15     mins  31147;     Therapeutic Exercise:    15     mins  16759;     Neuromuscular Dawn:    8    mins  23480;    Therapeutic Activity:          mins  96609;     Gait Training:           mins  92099;     Ultrasound:          mins  26777;    Ionto                                  mins  90888  Self Care                            mins  20516  Canalith Repos         mins  79954  Orthotic MGMT/Train         mins  27604    Un-Timed:  Electrical Stimulation:         mins  16942 ( );  Dry Needling:          mins  66927 self-pay;  Dry Needling:          mins  91610 self-pay  Traction          mins  54806      Timed Treatment:   38   mins   Total Treatment:     38   mins        PT SIGNATURE: Renee Beard PT     License Number: [id number]  Electronically signed by Renee Beard PT, 10/19/23,  2:13 PM EDT

## 2023-10-23 ENCOUNTER — TREATMENT (OUTPATIENT)
Age: 58
End: 2023-10-23
Payer: COMMERCIAL

## 2023-10-23 DIAGNOSIS — Z98.890 S/P ARTHROSCOPY OF RIGHT SHOULDER: Primary | ICD-10-CM

## 2023-10-23 DIAGNOSIS — M25.511 ACUTE PAIN OF RIGHT SHOULDER: ICD-10-CM

## 2023-10-23 DIAGNOSIS — M25.611 DECREASED ROM OF RIGHT SHOULDER: ICD-10-CM

## 2023-10-23 PROCEDURE — 97112 NEUROMUSCULAR REEDUCATION: CPT | Performed by: PHYSICAL THERAPIST

## 2023-10-23 PROCEDURE — 97140 MANUAL THERAPY 1/> REGIONS: CPT | Performed by: PHYSICAL THERAPIST

## 2023-10-23 PROCEDURE — 97110 THERAPEUTIC EXERCISES: CPT | Performed by: PHYSICAL THERAPIST

## 2023-10-23 NOTE — PROGRESS NOTES
Physical Therapy Treatment Note  [facility] [place]   [address] [place] [address]  P: [phone]       F: [phone]      Patient: Carolin Zapien   : 1965  Treatment Diagnosis:     ICD-10-CM ICD-9-CM   1. S/P arthroscopy of right shoulder  Z98.890 V45.89   2. Acute pain of right shoulder  M25.511 719.41   3. Decreased ROM of right shoulder  M25.611 719.51     Referring practitioner: Rosaura Rushing MD  Date of Initial Visit: Type: THERAPY  Noted: 2023  Today's Date: 10/23/2023  Patient seen for 12 sessions           Subjective   Patient reports her shoulder has been less achy this weekend.    Objective     See Exercise, Manual, and Modality Logs for complete treatment.       Assessment/Plan  Patient performed program with progressed exercises.  She has improving ROM and strength.  Scapular tracking and stabilization is improving as well. She continued to respond positively to manual therapy with improved mobility and decreased end range discomfort.  Progress per Plan of Care           Timed:         Manual Therapy:    15     mins  18422;     Therapeutic Exercise:    15     mins  36154;     Neuromuscular Dawn:    8    mins  63006;    Therapeutic Activity:          mins  92955;     Gait Training:           mins  57366;     Ultrasound:          mins  17519;    Ionto                                  mins  84708  Self Care                            mins  43017  Canalith Repos         mins  39942  Orthotic MGMT/Train         mins  03063    Un-Timed:  Electrical Stimulation:         mins  47428 ( );  Dry Needling:          mins  09837 self-pay;  Dry Needling:          mins  25806 self-pay  Traction          mins  20746      Timed Treatment:   38   mins   Total Treatment:     38   mins        PT SIGNATURE: Renee Beard PT     License Number: [id number]  Electronically signed by Renee Beard PT, 10/23/23, 10:40 AM EDT

## 2023-10-26 ENCOUNTER — TREATMENT (OUTPATIENT)
Age: 58
End: 2023-10-26
Payer: COMMERCIAL

## 2023-10-26 DIAGNOSIS — M25.511 ACUTE PAIN OF RIGHT SHOULDER: ICD-10-CM

## 2023-10-26 DIAGNOSIS — M25.611 DECREASED ROM OF RIGHT SHOULDER: ICD-10-CM

## 2023-10-26 DIAGNOSIS — Z98.890 S/P ARTHROSCOPY OF RIGHT SHOULDER: Primary | ICD-10-CM

## 2023-10-26 NOTE — PROGRESS NOTES
Physical Therapy Treatment Note  [facility] [place]   [address] [place] [address]  P: [phone]       F: [phone]      Patient: Carolin Zapien   : 1965  Treatment Diagnosis:     ICD-10-CM ICD-9-CM   1. S/P arthroscopy of right shoulder  Z98.890 V45.89   2. Acute pain of right shoulder  M25.511 719.41   3. Decreased ROM of right shoulder  M25.611 719.51     Referring practitioner: Rosaura Rushing MD  Date of Initial Visit: Type: THERAPY  Noted: 2023  Today's Date: 10/26/2023  Patient seen for 13 sessions           Subjective   Patient reports her shoulder is a little better.  States she has been able to get her hand behind her head easier and can almost put her hair in a pony tail.  States she is leaving on vacation today.    Objective     See Exercise, Manual, and Modality Logs for complete treatment.       Assessment/Plan  Patient continues to improve with ROM and strength.  She responds positively to manual therapy and strength progression.  She performed additional exercise for isolating ER with no adverse symptoms.  Will resume PT upon her return from vacation.  Progress per Plan of Care and Progress strengthening /stabilization /functional activity           Timed:         Manual Therapy:    13     mins  86922;     Therapeutic Exercise:    10     mins  55719;     Neuromuscular Dawn:        mins  94506;    Therapeutic Activity:          mins  07617;     Gait Training:           mins  89316;     Ultrasound:          mins  21674;    Ionto                                  mins  29439  Self Care                            mins  56391  Canalith Repos         mins  35482  Orthotic MGMT/Train         mins  91083    Un-Timed:  Electrical Stimulation:         mins  71924 ( );  Dry Needling:          mins  51108 self-pay;  Dry Needling:          mins  35469 self-pay  Traction          mins  68809      Timed Treatment:   23   mins   Total  Treatment:     23   mins        PT SIGNATURE: Renee Beard, PT     License Number: [id number]  Electronically signed by Renee Beard, PT, 10/26/23, 2:24 PM EDT

## 2023-11-03 ENCOUNTER — TREATMENT (OUTPATIENT)
Age: 58
End: 2023-11-03
Payer: COMMERCIAL

## 2023-11-03 DIAGNOSIS — M25.611 DECREASED ROM OF RIGHT SHOULDER: ICD-10-CM

## 2023-11-03 DIAGNOSIS — M25.511 ACUTE PAIN OF RIGHT SHOULDER: ICD-10-CM

## 2023-11-03 DIAGNOSIS — Z98.890 S/P ARTHROSCOPY OF RIGHT SHOULDER: Primary | ICD-10-CM

## 2023-11-03 NOTE — PROGRESS NOTES
Physical Therapy Treatment Note  [facility] [place]   [address] [place] [address]  P: [phone]       F: [phone]      Patient: Carolin Zapien   : 1965  Treatment Diagnosis:     ICD-10-CM ICD-9-CM   1. S/P arthroscopy of right shoulder  Z98.890 V45.89   2. Acute pain of right shoulder  M25.511 719.41   3. Decreased ROM of right shoulder  M25.611 719.51     Referring practitioner: Rosaura Rushing MD  Date of Initial Visit: Type: THERAPY  Noted: 2023  Today's Date: 11/3/2023  Patient seen for 14 sessions           Subjective   Patient reports her shoulder is still sore but she feels she has more movement.  She can put her hair in a pony tail now.      Objective     See Exercise, Manual, and Modality Logs for complete treatment.       Assessment/Plan  Patient responded well to manual therapy with improved right shoulder ROM to near full mobility.  She still has capsular tightness at end range.  She was able to perform progressed exercises with no adverse symptoms  Progress per Plan of Care           Timed:         Manual Therapy:    20     mins  89724;     Therapeutic Exercise:    15     mins  13147;     Neuromuscular Dawn:        mins  38559;    Therapeutic Activity:          mins  59735;     Gait Training:           mins  96434;     Ultrasound:          mins  02014;    Ionto                                  mins  22221  Self Care                            mins  97572  Canalith Repos         mins  31764  Orthotic MGMT/Train         mins  80792    Un-Timed:  Electrical Stimulation:         mins  24470 ( );  Dry Needling:          mins  71874 self-pay;  Dry Needling:          mins  15985 self-pay  Traction          mins  71188      Timed Treatment:   35   mins   Total Treatment:     35   mins        PT SIGNATURE: Renee Beard PT     License Number: [id number]  Electronically signed by Renee Beard PT, 23, 11:27 AM EDT

## 2023-11-06 ENCOUNTER — TREATMENT (OUTPATIENT)
Age: 58
End: 2023-11-06
Payer: COMMERCIAL

## 2023-11-06 DIAGNOSIS — M25.511 ACUTE PAIN OF RIGHT SHOULDER: ICD-10-CM

## 2023-11-06 DIAGNOSIS — M25.611 DECREASED ROM OF RIGHT SHOULDER: ICD-10-CM

## 2023-11-06 DIAGNOSIS — Z98.890 S/P ARTHROSCOPY OF RIGHT SHOULDER: Primary | ICD-10-CM

## 2023-11-06 PROCEDURE — 97530 THERAPEUTIC ACTIVITIES: CPT | Performed by: PHYSICAL THERAPIST

## 2023-11-06 PROCEDURE — 97110 THERAPEUTIC EXERCISES: CPT | Performed by: PHYSICAL THERAPIST

## 2023-11-06 NOTE — PROGRESS NOTES
Physical Therapy Re-Assessment   [facility] [place]   [address] [place] [address]  P: [phone]       F: [phone]        Patient: Carolin Zapien   : 1965  Visit Diagnoses:     ICD-10-CM ICD-9-CM   1. S/P arthroscopy of right shoulder  Z98.890 V45.89   2. Acute pain of right shoulder  M25.511 719.41   3. Decreased ROM of right shoulder  M25.611 719.51     Referring practitioner: Rosaura Rushing MD  Date of Initial Visit: Type: THERAPY  Noted: 2023  Today's Date: 2023  Patient seen for 15 sessions      Subjective:   Carolin Zapien reports:   Subjective Questionnaire: QuickDASH: 29.55%  Clinical Progress: improved  Home Program Compliance: Yes  Treatment has included: therapeutic exercise, neuromuscular re-education, manual therapy, therapeutic activity, and cryotherapy    Subjective   Patient reports her shoulder is feeling better.  States the new exercises are really working her muscles.    Objective     Active Range of Motion      Right Shoulder   Flexion: 160 degrees  Extension: 45 degrees   Abduction: 155 degrees   External rotation 90°: 60 degrees   Internal rotation 90°: 60 degrees      Scapular Mobility      Right Shoulder   Scapular mobility: Good  Scapular Mobility with Shoulder to 90° FF   Scapular winging: mild     Strength/Myotome Testing      Right Shoulder   Flexion: 4+  Extension: 5  Abduction: 4  External rotation: 4+  Internal rotation: 5     See Exercise, Manual, and Modality Logs for complete treatment.     Assessment/Plan  Patient demonstrates improved shoulder ROM and strength.  She still has end range discomfort and mild ROM limitations.  She is progressing with treatment and tolerating exercise progression well.  She will benefit from continued PT.    Progress toward previous goals: Partially Met    Goal Review  Short Term Goals (2 wks):  1. Patient to have increased right shoulder flexion and abduction to 170  degrees.-progressing  2. Patient will have increased right shoulder ER and IR to 70 degrees.-progressing  3. Patient will have increased increased right scapular mobility to WNL.-progressing  4. Patient will have increased GHJ mobility to WNL.-progressing     Long Term Goal (4 wks):  1.  Patient will have improved DASH score of 15% or less.-progressing  2.  Patient will have increased right shoulder strength to 4+/5.-progressing  3.  Patient will be independent in performance of HEP.-progressing  4.  Patient will be able to perform functional reach and lift tasks.-progressing         Recommendations: Continue as planned  Timeframe: 1 month  Prognosis to achieve goals: good    PT SIGNATURE: Renee Beard PT     License Number: [id number]  Electronically signed by Renee Beard PT, 11/06/23, 10:50 AM EST        Timed:         Manual Therapy:         mins  33921;     Therapeutic Exercise:    23     mins  31927;     Neuromuscular Dawn:        mins  15296;    Therapeutic Activity:     10     mins  27078;     Gait Training:           mins  48929;     Ultrasound:          mins  52077;    Ionto                                  mins  24013  Self Care                            mins  77932  Canalith Repos         mins  39542  Orthotic MGMT/Train         mins  97455    Un-Timed:  Electrical Stimulation:         mins  22576 ( );  Dry Needling:          mins  88753 self-pay;  Dry Needling:          mins  03603 self-pay  Traction          mins  94848  Low Eval          mins  75372  Mod Eval          mins  19938  High Eval                            mins  38720    Timed Treatment:   33   mins   Total Treatment:     33   mins

## 2023-11-09 NOTE — PROGRESS NOTES
Assessment and Plan     Patient Instructions    Problem List Items Addressed This Visit    None  Visit Diagnoses       Need for vaccination    -  Primary    Relevant Orders    Fluzone (or Fluarix & Flulaval for VFC) >6 Mos ([id number]) (Completed)               No follow-ups on file.          Carolin is a 58 y.o. being seen today for  Annual Exam   HISTORY    HPI  Annual Exam-Postmenopausal:     Carolin Zapien 58 y.o.female presents for annual exam.     Last pap : approximate date 7/2022 and was normal  Last mammogramapproximate date 7/26/23 and was normal  The patient is taking hormone replacement therapy.   The patient wears seatbelts: yes.    The patient has regular exercise: yes.     Exercise: 7 times/week.  This patient has ever been tested for HepC : yes .   Dental Exam. up to date  history; colonoscopy/sigmoidoscopy: colonoscopy 0 years ago without abnormalities.  Her immunization are up-to-date.  She is eating a healthy diet.   Labs results were ordered  Social History  She  reports that she quit smoking about 18 years ago. Her smoking use included cigarettes. She started smoking about 39 years ago. She has a 20.00 pack-year smoking history. She has never used smokeless tobacco. She reports current alcohol use of about 1.0 standard drink of alcohol per week. She reports that she does not use drugs.  EXAM DATA    Vital Signs        BP Readings from Last 1 Encounters:   11/13/23 118/60     Wt Readings from Last 3 Encounters:   11/13/23 64 kg (141 lb)   10/09/23 63.9 kg (140 lb 14.4 oz)   09/11/23 62.6 kg (138 lb)   Body mass index is 26.64 kg/m².   Physical Exam  Vitals reviewed.   Constitutional:       General: She is not in acute distress.     Appearance: Normal appearance. She is well-developed.   HENT:      Head: Normocephalic and atraumatic.      Right Ear: Tympanic membrane, ear canal and external ear normal.      Left Ear: Tympanic membrane, ear canal and external ear normal.   Eyes:       Conjunctiva/sclera: Conjunctivae normal.   Neck:      Thyroid: No thyromegaly.      Trachea: No tracheal deviation.   Cardiovascular:      Rate and Rhythm: Normal rate and regular rhythm.      Heart sounds: Normal heart sounds.   Pulmonary:      Effort: Pulmonary effort is normal. No respiratory distress.      Breath sounds: Normal breath sounds. No wheezing or rales.   Abdominal:      General: Bowel sounds are normal. There is no distension.      Palpations: Abdomen is soft.      Tenderness: There is no abdominal tenderness.   Musculoskeletal:         General: No deformity.      Cervical back: Normal range of motion and neck supple.   Lymphadenopathy:      Cervical: No cervical adenopathy.   Skin:     General: Skin is warm and dry.   Neurological:      Mental Status: She is alert and oriented to person, place, and time.   Psychiatric:         Behavior: Behavior normal.         Thought Content: Thought content normal.         Judgment: Judgment normal.                        Patient was given instructions and counseling regarding her condition or for health maintenance advice. Please see specific information pulled into the AVS if appropriate.

## 2023-11-13 ENCOUNTER — OFFICE VISIT (OUTPATIENT)
Dept: FAMILY MEDICINE CLINIC | Facility: CLINIC | Age: 58
End: 2023-11-13
Payer: COMMERCIAL

## 2023-11-13 ENCOUNTER — TREATMENT (OUTPATIENT)
Age: 58
End: 2023-11-13
Payer: COMMERCIAL

## 2023-11-13 VITALS
HEIGHT: 61 IN | WEIGHT: 141 LBS | BODY MASS INDEX: 26.62 KG/M2 | SYSTOLIC BLOOD PRESSURE: 118 MMHG | OXYGEN SATURATION: 99 % | DIASTOLIC BLOOD PRESSURE: 60 MMHG | RESPIRATION RATE: 16 BRPM | HEART RATE: 69 BPM

## 2023-11-13 DIAGNOSIS — M25.511 ACUTE PAIN OF RIGHT SHOULDER: ICD-10-CM

## 2023-11-13 DIAGNOSIS — Z23 NEED FOR VACCINATION: ICD-10-CM

## 2023-11-13 DIAGNOSIS — Z00.00 HEALTHCARE MAINTENANCE: Primary | ICD-10-CM

## 2023-11-13 DIAGNOSIS — M25.611 DECREASED ROM OF RIGHT SHOULDER: ICD-10-CM

## 2023-11-13 DIAGNOSIS — Z98.890 S/P ARTHROSCOPY OF RIGHT SHOULDER: Primary | ICD-10-CM

## 2023-11-13 DIAGNOSIS — E03.9 ADULT HYPOTHYROIDISM: ICD-10-CM

## 2023-11-13 PROBLEM — M75.111 INCOMPLETE TEAR OF RIGHT ROTATOR CUFF: Status: RESOLVED | Noted: 2023-07-27 | Resolved: 2023-11-13

## 2023-11-13 LAB
ALBUMIN SERPL-MCNC: 4.7 G/DL (ref 3.5–5.2)
ALBUMIN/GLOB SERPL: 2.1 G/DL
ALP SERPL-CCNC: 83 U/L (ref 39–117)
ALT SERPL-CCNC: 18 U/L (ref 1–33)
AST SERPL-CCNC: 21 U/L (ref 1–32)
BILIRUB SERPL-MCNC: 0.3 MG/DL (ref 0–1.2)
BUN SERPL-MCNC: 10 MG/DL (ref 6–20)
BUN/CREAT SERPL: 15.2 (ref 7–25)
CALCIUM SERPL-MCNC: 9.3 MG/DL (ref 8.6–10.5)
CHLORIDE SERPL-SCNC: 105 MMOL/L (ref 98–107)
CHOLEST SERPL-MCNC: 239 MG/DL (ref 0–200)
CO2 SERPL-SCNC: 26.4 MMOL/L (ref 22–29)
CREAT SERPL-MCNC: 0.66 MG/DL (ref 0.57–1)
EGFRCR SERPLBLD CKD-EPI 2021: 102.5 ML/MIN/1.73
ERYTHROCYTE [DISTWIDTH] IN BLOOD BY AUTOMATED COUNT: 12 % (ref 12.3–15.4)
GLOBULIN SER CALC-MCNC: 2.2 GM/DL
GLUCOSE SERPL-MCNC: 95 MG/DL (ref 65–99)
HCT VFR BLD AUTO: 42.1 % (ref 34–46.6)
HDLC SERPL-MCNC: 60 MG/DL (ref 40–60)
HGB BLD-MCNC: 14.4 G/DL (ref 12–15.9)
LDLC SERPL CALC-MCNC: 163 MG/DL (ref 0–100)
LDLC/HDLC SERPL: 2.67 {RATIO}
MCH RBC QN AUTO: 31.1 PG (ref 26.6–33)
MCHC RBC AUTO-ENTMCNC: 34.2 G/DL (ref 31.5–35.7)
MCV RBC AUTO: 90.9 FL (ref 79–97)
PLATELET # BLD AUTO: 235 10*3/MM3 (ref 140–450)
POTASSIUM SERPL-SCNC: 4.4 MMOL/L (ref 3.5–5.2)
PROT SERPL-MCNC: 6.9 G/DL (ref 6–8.5)
RBC # BLD AUTO: 4.63 10*6/MM3 (ref 3.77–5.28)
SODIUM SERPL-SCNC: 140 MMOL/L (ref 136–145)
TRIGL SERPL-MCNC: 94 MG/DL (ref 0–150)
TSH SERPL DL<=0.005 MIU/L-ACNC: 1.22 UIU/ML (ref 0.27–4.2)
VLDLC SERPL CALC-MCNC: 16 MG/DL (ref 5–40)
WBC # BLD AUTO: 5.77 10*3/MM3 (ref 3.4–10.8)

## 2023-11-13 NOTE — PROGRESS NOTES
Physical Therapy Daily Treatment Note    [facility]  [address]  [place] [address]     Patient: Carolin Zapien   : 1965  Referring practitioner: Rosaura Rushing MD  Date of Initial Visit: Type: THERAPY  Noted: 2023  Today's Date: 2023  Patient seen for 16 sessions         Carolin Zapien reports: still with some right shoulder stiffness and limitations.        Subjective     Objective   See Exercise, Manual, and Modality Logs for complete treatment.   Exercise rationale/ pain free exercise performance  Anatomy and structure of affected musculature  Posture/Postural awareness  Lumbar support/thoracic support  Ice to affected area   Sleeping positions with pillows  Alternate exercise positions  Verbal/Tactile cues to ensure correct exercise performance/technique          Assessment/Plan  Compliant/Cooperative with rehab efforts this session.  Subjectively reports no increased symptoms or discomfort with therapeutic exercise today but still notes some right shoulder soreness and end range limitations.  Able to perform increased exercise/functional activity without increased R sh pain.  Benefits from verbal/tactile cues to ensure proper scapular positioning with exercise performance.         Progress strengthening /stabilization /functional activity per MD protocol.  RTMD            Timed:  Manual Therapy: 12        mins  69597;  Therapeutic Exercise:    20     mins  00862;     Neuromuscular Dawn:        mins  58500;    Therapeutic Activity:    10      mins  56819;     Gait Training:           mins  22264;     Ultrasound:          mins  78388;    Self Care                    _12__      mins 89966    Untimed:  Electrical Stimulation:         mins  99640 ( );  Mechanical Traction:         mins  56467;     Timed Treatment:   54   mins   Total Treatment:    54    mins  Mj Barclay PTA  Physical Therapist  Assistant  [id number]

## 2023-11-14 ENCOUNTER — TELEPHONE (OUTPATIENT)
Dept: FAMILY MEDICINE CLINIC | Facility: CLINIC | Age: 58
End: 2023-11-14
Payer: COMMERCIAL

## 2023-11-14 DIAGNOSIS — E03.9 ADULT HYPOTHYROIDISM: ICD-10-CM

## 2023-11-14 RX ORDER — LEVOTHYROXINE SODIUM 0.1 MG/1
100 TABLET ORAL DAILY
Qty: 90 TABLET | Refills: 3 | Status: SHIPPED | OUTPATIENT
Start: 2023-11-14 | End: 2024-11-13

## 2023-11-14 NOTE — TELEPHONE ENCOUNTER
----- Message from Shazia Balderas MD sent at 11/13/2023  9:24 AM EST -----  Regarding: Refill Thyroid  Make sure it is 90 day

## 2023-11-19 NOTE — DISCHARGE INSTRUCTIONS
What to expect after a Nerve Block    Nerve blocks administered to block pain affect many types of nerves, including those nerves that control movement, pain, and normal sensation. Following a nerve block, you may notice some bruising at the site where the block was given. You may experience sensations such as: numbness of the affected area or limb, tingling, heaviness (that is the limb feels heavy to you), weakness or inability to move the affected arm or leg, or a feeling as if your arm or leg has “fallen asleep.”     A nerve block can last from 2 to 36 hours depending on the medications used.  Usually the weakness wears off first followed by the tingling and heaviness. As the block wears off, you may begin to notice pain; however, this sequence of events may occur in any order. Typically, you will be able to move your limb before you will feel it. Once a nerve block begins to wear off, the effects are usually completely gone within 60 minutes.  If you experience continued side effects that you believe are block related for longer than 48 hours, please call your healthcare provider. Please see block-specific instructions below.    Instructions for any block involving the shoulder or arm  If you have had any kind of shoulder/arm block, you will go home with your arm in a sling. Wear the sling until the block has completely worn off. You may be required to wear it for a longer period of time per your surgeon’s recommendations.  If you have had a shoulder/arm block, it is a good idea to sleep on a recliner with pillows under your arm.    You may experience symptoms such as:  Shortness of breath  Hoarseness   Blurry vision  Unequal pupils  Drooping of your face on the same side as the block was performed    These are side effects associated with this kind of block and should go away within 12 hours.    Note: If you have severe or prolonged shortness of breath, please seek medical assistance as soon as possible.      Protection of a “blocked” arm or leg (limb)  After a nerve block, you cannot feel pain, pressure, or extremes of temperature in the affected limb. And because of this, your blocked limb is at more risk for injury. For example, it is possible to burn your limb on an extremely hot surface without feeling it.     When resting, it is important to reposition your limb periodically to avoid prolonged pressure on it. This may require the use of pillows and padding.    While sleeping, you should avoid rolling onto the affected limb or putting too much pressure on it.     If you have a cast or tight dressing, check the color of your fingers or toes of the affected limb. Call your surgeon if they look discolored (that is, dusky, dark colored).    Use caution in cold weather. Cover your limb appropriately to protect it from the cold.      Pain Management:    Your surgeon will give you a prescription for pain medication. Begin taking this before the nerve block wears off. Bear in mind that sometimes the block can wear off in the middle of the night.     JOINT PAIN

## 2023-11-20 ENCOUNTER — TREATMENT (OUTPATIENT)
Age: 58
End: 2023-11-20
Payer: COMMERCIAL

## 2023-11-20 DIAGNOSIS — M25.511 ACUTE PAIN OF RIGHT SHOULDER: ICD-10-CM

## 2023-11-20 DIAGNOSIS — Z98.890 S/P ARTHROSCOPY OF RIGHT SHOULDER: Primary | ICD-10-CM

## 2023-11-20 DIAGNOSIS — M25.611 DECREASED ROM OF RIGHT SHOULDER: ICD-10-CM

## 2023-11-20 NOTE — PROGRESS NOTES
Physical Therapy Treatment Note  [facility] [place]   [address] [place] [address]  P: [phone]       F: [phone]      Patient: Carolin Zapien   : 1965  Treatment Diagnosis:     ICD-10-CM ICD-9-CM   1. S/P arthroscopy of right shoulder  Z98.890 V45.89   2. Acute pain of right shoulder  M25.511 719.41   3. Decreased ROM of right shoulder  M25.611 719.51     Referring practitioner: Rosaura Rushing MD  Date of Initial Visit: Type: THERAPY  Noted: 2023  Today's Date: 2023  Patient seen for 17 sessions           Subjective   Patient reports she is still having stiffness and soreness in her right shoulder. States she still feels/hears popping in her shoulder.  Having difficulty reaching the back of her neck.  States she has a question regarding a couple exercises.    Objective     See Exercise, Manual, and Modality Logs for complete treatment.       Assessment/Plan  Patient has functional ROM although limited with end range elevation and rotation.  She has palpable and audible ACJ crepitus as well as TTP over the ACJ.  She continues to respond positively to manual therapy and has improved mobility post treatment.  Reviewed HEP and modified technique for resisted flexion, IR and ER.  Discussed activity modifications and strategies to prevent shoulder strain/over working with the increased activity of this holiday week.  Progress per Plan of Care and Progress strengthening /stabilization /functional activity           Timed:         Manual Therapy:    20     mins  78013;     Therapeutic Exercise:    10     mins  48688;     Neuromuscular Dawn:        mins  68216;    Therapeutic Activity:          mins  47865;     Gait Training:           mins  68076;     Ultrasound:          mins  11084;    Ionto                                  mins  08638  Self Care                            mins  07541  Canalith Repos         mins  27553  Orthotic MGMT/Train          mins  41432    Un-Timed:  Electrical Stimulation:         mins  94288 ( );  Dry Needling:          mins  70236 self-pay;  Dry Needling:          mins  81558 self-pay  Traction          mins  58944      Timed Treatment:   30   mins   Total Treatment:     30   mins        PT SIGNATURE: Renee Beard PT     License Number: [id number]  Electronically signed by Renee Beard PT, 11/20/23, 10:29 AM EST

## 2023-11-21 NOTE — PROGRESS NOTES
Right Shoulder Scope Follow Up       Patient: Carolin Zapien        YOB: 1965      Chief Complaints: shoulder pain right      History of Present Illness: Pt is here f/u shoulder arthroscopy on the right she still having some pain some limitation of range of motion she continues to work with physical therapy        Allergies:   Allergies   Allergen Reactions   • Pantoprazole Hives   • Sulfa Antibiotics Hives   • Terbinafine Hives       Medications:   Home Medications:  Current Outpatient Medications on File Prior to Visit   Medication Sig   • acetaminophen (TYLENOL) 500 MG tablet Take 1 tablet by mouth Every 6 (Six) Hours As Needed for Mild Pain.   • azelastine (ASTELIN) 0.1 % nasal spray 1 spray 2 (Two) Times a Day As Needed.   • EPINEPHrine (EPIPEN) 0.3 MG/0.3ML solution auto-injector injection    • estradiol (ESTRACE) 0.1 MG/GM vaginal cream Insert 2 g into the vagina 2 (Two) Times a Week.   • estradiol (VIVELLE-DOT) 0.025 MG/24HR patch Place 1 patch on the skin as directed by provider 2 (Two) Times a Week.   • fexofenadine (ALLEGRA) 180 MG tablet Take 1 tablet by mouth As Needed.   • fluticasone (FLONASE) 50 MCG/ACT nasal spray 2 sprays into the nostril(s) as directed by provider Every Morning.   • ibuprofen (ADVIL,MOTRIN) 200 MG tablet Take 1 tablet by mouth Every 6 (Six) Hours As Needed for Mild Pain. HOLD PRIOR TO SURG   • levothyroxine (SYNTHROID, LEVOTHROID) 100 MCG tablet Take 1 tablet by mouth Daily.   • Misc Natural Products (OSTEO BI-FLEX TRIPLE STRENGTH) tablet HOLD PRIOR TO SURG   • Probiotic Product (PROBIOTIC DAILY PO) Take 1 tablet by mouth Daily.   • progesterone (PROMETRIUM) 100 MG capsule Take 1 capsule by mouth Every Night.   • pseudoephedrine (SUDAFED) 120 MG 12 hr tablet Take 1 tablet by mouth Every 12 (Twelve) Hours As Needed.   • Turmeric (QC TUMERIC COMPLEX PO) Take  by mouth. HOLD PRIOR TO SURG   • Vitamin D, Cholecalciferol, 50 MCG (2000 UT) capsule Take 1 capsule by  mouth Daily.     No current facility-administered medications on file prior to visit.     Current Medications:  Scheduled Meds:  Continuous Infusions:No current facility-administered medications for this visit.    PRN Meds:.          Physical Exam: 58 y.o. female  General Appearance:    Alert, cooperative, in no acute distress                 There were no vitals filed for this visit.   Patient is alert and oriented ×3 no acute distress normal mood physical exam.  Physical exam of the shoulder, incisions looked good there is no erythema,no signs or sx of infection.  Physical exam of the right shoulder reveals no overlying skin changes no lymphedema no lymphadenopathy.  Patient has active flexion 170 with mild symptoms abduction is similar external rotation is to 40 and internal rotation to the lower lumbar spine with mild symptoms.  Patient has good rotator cuff strength 4+ over 5 with isometric strength testing with pain.  Patient has a positive impingement and a positive Khalil sign.  Patient has good cervical range of motion which is full and asymptomatic no radicular symptoms.  Patient has a normal elbow exam.  Good distal pulses are present  Patient has pain with overhead activity and a positive Neer sign and a positive empty can sign , a positive drop arm and a definitive painful arc   Assessment  S/P shoulder scope.  I still think her limitation range of motion is making her symptomatic plan today is to do a glenohumeral injection and have her wait a few days and then get back on the therapy with the emphasis being on range of motion        Plan: Continue Physical Therapy. I reiterated restrictions            Large Joint Arthrocentesis: R glenohumeral  Date/Time: 11/27/2023 11:04 AM  Consent given by: patient  Site marked: site marked  Timeout: Immediately prior to procedure a time out was called to verify the correct patient, procedure, equipment, support staff and site/side marked as required   Supporting  Documentation  Indications: pain   Procedure Details  Location: shoulder - R glenohumeral  Preparation: Patient was prepped and draped in the usual sterile fashion  Needle gauge: 21G.  Approach: posterior  Medications administered: 80 mg methylPREDNISolone acetate 80 MG/ML; 2 mL lidocaine PF 1% 1 %  Patient tolerance: patient tolerated the procedure well with no immediate complications

## 2023-11-27 ENCOUNTER — OFFICE VISIT (OUTPATIENT)
Dept: ORTHOPEDIC SURGERY | Facility: CLINIC | Age: 58
End: 2023-11-27
Payer: COMMERCIAL

## 2023-11-27 VITALS — BODY MASS INDEX: 26.51 KG/M2 | TEMPERATURE: 97.3 F | HEIGHT: 61 IN | WEIGHT: 140.4 LBS

## 2023-11-27 DIAGNOSIS — Z98.890 S/P ARTHROSCOPY OF SHOULDER: ICD-10-CM

## 2023-11-27 DIAGNOSIS — M75.01 ADHESIVE CAPSULITIS OF RIGHT SHOULDER: Primary | ICD-10-CM

## 2023-11-27 RX ORDER — LIDOCAINE HYDROCHLORIDE 10 MG/ML
2 INJECTION, SOLUTION EPIDURAL; INFILTRATION; INTRACAUDAL; PERINEURAL
Status: COMPLETED | OUTPATIENT
Start: 2023-11-27 | End: 2023-11-27

## 2023-11-27 RX ORDER — METHYLPREDNISOLONE ACETATE 80 MG/ML
80 INJECTION, SUSPENSION INTRA-ARTICULAR; INTRALESIONAL; INTRAMUSCULAR; SOFT TISSUE
Status: COMPLETED | OUTPATIENT
Start: 2023-11-27 | End: 2023-11-27

## 2023-11-27 RX ADMIN — LIDOCAINE HYDROCHLORIDE 2 ML: 10 INJECTION, SOLUTION EPIDURAL; INFILTRATION; INTRACAUDAL; PERINEURAL at 11:04

## 2023-11-27 RX ADMIN — METHYLPREDNISOLONE ACETATE 80 MG: 80 INJECTION, SUSPENSION INTRA-ARTICULAR; INTRALESIONAL; INTRAMUSCULAR; SOFT TISSUE at 11:04

## 2023-11-29 ENCOUNTER — CLINICAL SUPPORT (OUTPATIENT)
Dept: FAMILY MEDICINE CLINIC | Facility: CLINIC | Age: 58
End: 2023-11-29
Payer: COMMERCIAL

## 2023-11-29 DIAGNOSIS — Z23 NEED FOR VACCINATION: Primary | ICD-10-CM

## 2023-12-04 ENCOUNTER — TREATMENT (OUTPATIENT)
Age: 58
End: 2023-12-04
Payer: COMMERCIAL

## 2023-12-04 DIAGNOSIS — M25.611 DECREASED ROM OF RIGHT SHOULDER: ICD-10-CM

## 2023-12-04 DIAGNOSIS — M25.511 ACUTE PAIN OF RIGHT SHOULDER: ICD-10-CM

## 2023-12-04 DIAGNOSIS — Z98.890 S/P ARTHROSCOPY OF RIGHT SHOULDER: Primary | ICD-10-CM

## 2023-12-04 PROCEDURE — 97530 THERAPEUTIC ACTIVITIES: CPT | Performed by: PHYSICAL THERAPIST

## 2023-12-04 PROCEDURE — 97140 MANUAL THERAPY 1/> REGIONS: CPT | Performed by: PHYSICAL THERAPIST

## 2023-12-04 PROCEDURE — 97110 THERAPEUTIC EXERCISES: CPT | Performed by: PHYSICAL THERAPIST

## 2023-12-04 NOTE — PROGRESS NOTES
Physical Therapy Re-Assessment / Re-Certification  [facility] [place]   [address] [place] [address]  P: [phone]       F: [phone]        Patient: Carolin Zapien   : 1965  Visit Diagnoses:     ICD-10-CM ICD-9-CM   1. S/P arthroscopy of right shoulder  Z98.890 V45.89   2. Acute pain of right shoulder  M25.511 719.41   3. Decreased ROM of right shoulder  M25.611 719.51     Referring practitioner: Rosaura Rushing MD  Date of Initial Visit: Type: THERAPY  Noted: 2023  Today's Date: 2023  Patient seen for 18 sessions      Subjective:   Carolin Zapien reports:   Subjective Questionnaire: QuickDASH: 38.64%  Clinical Progress: improved  Home Program Compliance: Yes  Treatment has included: therapeutic exercise, neuromuscular re-education, manual therapy, therapeutic activity, and cryotherapy    Subjective Evaluation    Pain  At best pain ratin (at rest)  At worst pain rating: 10 (With certain movement)      Patient reports she did not get a lot of relief from the shoulder injection.  States she is getting discouraged that she is still having pain.  States she does not have pain at rest but with certain movements she gets a sharp jolt of pain.  States this has happened when stirring chopped vegetables in a bowl with her hand and when she did a quick reach to grab a glass that she knocked over.  States she has been having increased neck pain after doing a work call when she had to lean in to be close to the microphone.         Objective     Active Range of Motion      Right Shoulder   Flexion: 170 degrees  Extension: 35 degrees (anterior shoulder pain)  Abduction: 140 degrees   External rotation 90°: 60 degrees   Internal rotation 90°: 60 degrees      Scapular Mobility      Right Shoulder   Scapular mobility: Good  Scapular Mobility with Shoulder to 90° FF   Scapular winging: mild     Strength/Myotome Testing      Right Shoulder   Flexion:  4+  Extension: 5  Abduction: 4  External rotation: 4+  Internal rotation: 5     See Exercise, Manual, and Modality Logs for complete treatment.     Assessment/Plan  Patient presents with overall improved right shoulder ROM and strength.  She still has some deficits and limitations.  She is also having some upper quadrant muscle dysfunction due to overlapping cervical symptoms.  She responds positively to manual therapy and will benefit from continued PT.  Educated on the interaction of conditions and post-op progression back to advanced activities and sports.    Progress toward previous goals: Partially Met    Goal Review  Short Term Goals (2 wks):  1. Patient to have increased right shoulder flexion and abduction to 170 degrees.-partially met  2. Patient will have increased right shoulder ER and IR to 70 degrees.-progressing  3. Patient will have increased increased right scapular mobility to WNL.-progressing  4. Patient will have increased GHJ mobility to WNL.-progressing     Long Term Goal (4 wks):  1.  Patient will have improved DASH score of 15% or less.-progressing  2.  Patient will have increased right shoulder strength to 4+/5.-progressing  3.  Patient will be independent in performance of HEP.-met, ongoing  4.  Patient will be able to perform functional reach and lift tasks.-partially met                       Recommendations: Continue as planned  Timeframe: 1 month  Prognosis to achieve goals: good    PT SIGNATURE: Renee Beard PT     License Number: [id number]  Electronically signed by Renee Beard PT, 12/04/23, 10:42 AM EST    Certification Period: 12/4/2023 thru 3/2/2024  I certify that the therapy services are furnished while this patient is under my care.  The services outlined above are required by this patient, and will be reviewed every 90 days.    Signature: __________________________________    Rosaura Rushing MD   NPI: [id number]    Please sign and return via fax to [phone]. Thank  you, [facility].      Timed:         Manual Therapy:    10     mins  32732;     Therapeutic Exercise:    15     mins  58798;     Neuromuscular Dawn:        mins  36471;    Therapeutic Activity:     15     mins  84245;     Gait Training:           mins  30722;     Ultrasound:          mins  09419;    Ionto                                  mins  35590  Self Care                            mins  48177  Canalith Repos         mins  64580  Orthotic MGMT/Train         mins  11164    Un-Timed:  Electrical Stimulation:         mins  84117 ( );  Dry Needling:          mins  75297 self-pay;  Dry Needling:          mins  97051 self-pay  Traction          mins  04327  Low Eval          mins  42851  Mod Eval          mins  76801  High Eval                            mins  29832    Timed Treatment:   40   mins   Total Treatment:     40   mins

## 2023-12-15 NOTE — PROGRESS NOTES
Patient: Carolin Zapien  YOB: 1965  Date of Service: 12/15/2023    Chief Complaints: Right shoulder pain    Subjective:    History of Present Illness: Pt is seen in the office today with complaints of right shoulder pain she is status post arthroscopy of the shoulder last time I saw her she had a little loss of range of motion which I think was a big part of her remaining symptoms she has been working on that we did inject her she is better than she was clinically subjectively.  She is anxious to get back to all of her activities including swimming pickleball kayaking think all of those things are a little further down the road        Allergies:   Allergies   Allergen Reactions    Pantoprazole Hives    Sulfa Antibiotics Hives    Terbinafine Hives       Medications:   Home Medications:  Current Outpatient Medications on File Prior to Visit   Medication Sig    acetaminophen (TYLENOL) 500 MG tablet Take 1 tablet by mouth Every 6 (Six) Hours As Needed for Mild Pain.    azelastine (ASTELIN) 0.1 % nasal spray 1 spray 2 (Two) Times a Day As Needed.    EPINEPHrine (EPIPEN) 0.3 MG/0.3ML solution auto-injector injection     estradiol (ESTRACE) 0.1 MG/GM vaginal cream Insert 2 applicators into the vagina 2 (Two) Times a Week.    estradiol (VIVELLE-DOT) 0.025 MG/24HR patch Place 1 patch on the skin as directed by provider 2 (Two) Times a Week.    fexofenadine (ALLEGRA) 180 MG tablet Take 1 tablet by mouth As Needed.    fluticasone (FLONASE) 50 MCG/ACT nasal spray 2 sprays into the nostril(s) as directed by provider Every Morning.    ibuprofen (ADVIL,MOTRIN) 200 MG tablet Take 1 tablet by mouth Every 6 (Six) Hours As Needed for Mild Pain. HOLD PRIOR TO SURG    levothyroxine (SYNTHROID, LEVOTHROID) 100 MCG tablet Take 1 tablet by mouth Daily.    Misc Natural Products (OSTEO BI-FLEX TRIPLE STRENGTH) tablet HOLD PRIOR TO SURG    Probiotic Product (PROBIOTIC DAILY PO) Take 1 tablet by mouth Daily.    progesterone  (PROMETRIUM) 100 MG capsule Take 1 capsule by mouth Every Night.    pseudoephedrine (SUDAFED) 120 MG 12 hr tablet Take 1 tablet by mouth Every 12 (Twelve) Hours As Needed.    Turmeric (QC TUMERIC COMPLEX PO) Take  by mouth. HOLD PRIOR TO SURG    Vitamin D, Cholecalciferol, 50 MCG (2000 UT) capsule Take 1 capsule by mouth Daily.     No current facility-administered medications on file prior to visit.     Current Medications:  Scheduled Meds:  Continuous Infusions:No current facility-administered medications for this visit.    PRN Meds:.    I have reviewed the patient's medical history in detail and updated the computerized patient record.  Review and summarization of old records include:    Past Medical History:   Diagnosis Date    Allergic 1980    Allergy-induced asthma     Arthritis of back     Arthritis of neck 1998    Degenerative cervical disk disease    Bursitis of hip 2020    saw Dr. Leonardo for left hip pain    Cataract     maria l premature    Colon polyp August 2020    [facility]    DDD (degenerative disc disease), cervical     Diverticulitis of colon 9/23/2010    colonoscopy indicated mild case    Diverticulosis 2010    mild    Hypothyroidism 2010    Incomplete tear of right rotator cuff     Added automatically from request for surgery [id number]    Kidney cyst, acquired 10/24/2015    Migraines     RARELY    Multiple joint pain 07/18/2013    Rash     UPPER RIGHT CHEST INSTRUCTED PT TO NOTIFY DR CHAVEZ IF NOT GONE BY DAY OF SURGERY    Raynaud's disease     Right shoulder pain     Rotator cuff syndrome August 2022    see Dr. Chavez for this    Seasonal allergies     Seborrheic dermatitis         Past Surgical History:   Procedure Laterality Date    CHOLECYSTECTOMY N/A 12/29/2017    Procedure: CHOLECYSTECTOMY LAPAROSCOPIC;  Surgeon: Mc Gonzalez MD;  Location: [facility] OR [facility] [place];  Service:     COLONOSCOPY N/A 08/21/2020    Procedure: COLONOSCOPY TO CECUM AND TERMINAL ILEUM WITH COLD SNARE POLYPECTOMIES;  Surgeon: Saman  Ganesh MARKS MD;  Location: [facility] ENDOSCOPY;  Service: Gastroenterology;  Laterality: N/A;  PRE- SCREENING  POST- NORMAL TI, COLON POLYPS, DIVERTICULOSIS, INTERNAL HEMORRHOIDS    COLPOSCOPY      WITH BX; RESOLVED    DILATATION AND CURETTAGE      ENDOSCOPY N/A 2017    Procedure: ESOPHAGOGASTRODUODENOSCOPY with biopsies;  Surgeon: Ganesh Davison MD;  Location: [facility] ENDOSCOPY;  Service:     ENDOSCOPY AND COLONOSCOPY      GUM SURGERY      LEFT UPPER JAW    SEPTOPLASTY, TURBINECTOMY, ENDOSCOPIC MAXILLARY ANTROSTOMY      [facility]    SHOULDER ARTHROSCOPY Right 2023    Procedure: SHOULDER ARTHROSCOPY WITH DEBRIDEMENT OF LABRIUM, ROTATOR CUFF, DECOMPRESSION, AND INJECTION IN JOINT;  Surgeon: Rosaura Rushing MD;  [place]  [facility] [place] [facility] [place];  Service: Orthopedics;  Laterality: Right;    TONSILLECTOMY      TRIGGER POINT INJECTION  2019    Cervical branch block    UPPER GASTROINTESTINAL ENDOSCOPY  2010    [facility]    URETEROSCOPY      with cystoscopy        Social History     Occupational History    Occupation: HOMEMAKER   Tobacco Use    Smoking status: Former     Packs/day: 1.00     Years: 20.00     Additional pack years: 0.00     Total pack years: 20.00     Types: Cigarettes     Start date: 10/1/1984     Quit date: 2005     Years since quittin.1    Smokeless tobacco: Never   Vaping Use    Vaping Use: Never used   Substance and Sexual Activity    Alcohol use: Yes     Alcohol/week: 1.0 standard drink of alcohol     Types: 1 Cans of beer per week     Comment: social drinker    Drug use: No    Sexual activity: Yes     Partners: Male     Birth control/protection: Post-menopausal      Social History     Social History Narrative    LIVES WITH SPOUSE        Family History   Problem Relation Age of Onset    Hodgkin's lymphoma Mother     Migraines Mother     Thyroid disease Mother     Cancer Mother         Hodgkin's Lymphoma    Early death Mother         age 53    Hypertension Father     Depression Father      Atrial fibrillation Father     Anxiety disorder Father     Arthritis Father         Osteoarthritis    Cancer Father         CLL    Hearing loss Father         age related hearing loss    Heart disease Father         atrial fib    Hyperlipidemia Father     Other Father         spinal myelopathy    Migraines Sister     Hyperlipidemia Sister     Cardiomyopathy Brother         resolved    Lymphoma Son         Tcell    Cancer Son         NHL T-Cell Lymphoma    Hearing loss Son         congenital sensorineural    Hyperlipidemia Sister     Hyperlipidemia Brother     Thyroid disease Brother     Malig Hyperthermia Neg Hx        ROS: 14 point review of systems was performed and was negative except for documented findings in HPI and today's encounter.     Allergies:   Allergies   Allergen Reactions    Pantoprazole Hives    Sulfa Antibiotics Hives    Terbinafine Hives     Constitutional:  Denies fever, shaking or chills   Eyes:  Denies change in visual acuity   HENT:  Denies nasal congestion or sore throat   Respiratory:  Denies cough or shortness of breath   Cardiovascular:  Denies chest pain or severe LE edema   GI:  Denies abdominal pain, nausea, vomiting, bloody stools or diarrhea   Musculoskeletal:  Numbness, tingling, or loss of motor function only as noted above in history of present illness.  : Denies painful urination or hematuria  Integument:  Denies rash, lesion or ulceration   Neurologic:  Denies headache or focal weakness  Endocrine:  Denies lymphadenopathy  Psych:  Denies confusion or change in mental status   Hem:  Denies active bleeding      Physical Exam: 58 y.o. female  Wt Readings from Last 3 Encounters:   11/27/23 63.7 kg (140 lb 6.4 oz)   11/13/23 64 kg (141 lb)   10/09/23 63.9 kg (140 lb 14.4 oz)       There is no height or weight on file to calculate BMI.    There were no vitals filed for this visit.  Vital signs reviewed.   General Appearance:    Alert, cooperative, in no acute distress                     Ortho exam      Physical exam of the right shoulder reveals no overlying skin changes no lymphedema no lymphadenopathy.  Patient has active flexion 170 with mild symptoms abduction is similar external rotation is to 50 and internal rotation to the lower lumbar spine with mild symptoms.  Patient has good rotator cuff strength 4+ over 5 with isometric strength testing with pain.  Patient has a positive impingement and a positive Khalil sign.  Patient has good cervical range of motion which is full and asymptomatic no radicular symptoms.  Patient has a normal elbow exam.  Good distal pulses are present  Patient has pain with overhead activity and a positive Neer sign and a positive empty can sign , a positive drop arm and a definitive painful arc            Assessment: Status post shoulder arthroscopy I think she is doing well she is definitely improved I want her to continue working on that last little bit of range of motion continue working on her strengthening I will see her back in 6 to 8 weeks    Plan:   Follow up as indicated.  Ice, elevate, and rest as needed.  Discussed conservative measures of pain control including ice, bracing.  Also talked about the importance of strengthening and range of motion  Rosaura Rushing M.D.

## 2023-12-18 ENCOUNTER — OFFICE VISIT (OUTPATIENT)
Dept: ORTHOPEDIC SURGERY | Facility: CLINIC | Age: 58
End: 2023-12-18
Payer: COMMERCIAL

## 2023-12-18 VITALS — BODY MASS INDEX: 26.28 KG/M2 | HEIGHT: 61 IN | WEIGHT: 139.2 LBS | TEMPERATURE: 97.8 F

## 2023-12-18 DIAGNOSIS — Z98.890 S/P ARTHROSCOPY OF SHOULDER: ICD-10-CM

## 2023-12-18 DIAGNOSIS — M75.01 ADHESIVE CAPSULITIS OF RIGHT SHOULDER: Primary | ICD-10-CM

## 2023-12-18 PROCEDURE — 99212 OFFICE O/P EST SF 10 MIN: CPT | Performed by: ORTHOPAEDIC SURGERY

## 2024-11-15 ENCOUNTER — OFFICE VISIT (OUTPATIENT)
Dept: FAMILY MEDICINE CLINIC | Facility: CLINIC | Age: 59
End: 2024-11-15
Payer: COMMERCIAL

## 2024-11-15 VITALS
RESPIRATION RATE: 16 BRPM | SYSTOLIC BLOOD PRESSURE: 100 MMHG | HEART RATE: 54 BPM | HEIGHT: 61 IN | WEIGHT: 133 LBS | OXYGEN SATURATION: 99 % | DIASTOLIC BLOOD PRESSURE: 62 MMHG | BODY MASS INDEX: 25.11 KG/M2

## 2024-11-15 DIAGNOSIS — Z00.00 HEALTHCARE MAINTENANCE: ICD-10-CM

## 2024-11-15 DIAGNOSIS — E03.9 ACQUIRED HYPOTHYROIDISM: Primary | ICD-10-CM

## 2024-11-15 LAB
ALBUMIN SERPL-MCNC: 4.3 G/DL (ref 3.5–5.2)
ALBUMIN/GLOB SERPL: 1.7 G/DL
ALP SERPL-CCNC: 73 U/L (ref 39–117)
ALT SERPL-CCNC: 17 U/L (ref 1–33)
AST SERPL-CCNC: 25 U/L (ref 1–32)
BILIRUB SERPL-MCNC: 0.4 MG/DL (ref 0–1.2)
BUN SERPL-MCNC: 10 MG/DL (ref 6–20)
BUN/CREAT SERPL: 16.1 (ref 7–25)
CALCIUM SERPL-MCNC: 9.2 MG/DL (ref 8.6–10.5)
CHLORIDE SERPL-SCNC: 104 MMOL/L (ref 98–107)
CHOLEST SERPL-MCNC: 216 MG/DL (ref 0–200)
CO2 SERPL-SCNC: 26.9 MMOL/L (ref 22–29)
CREAT SERPL-MCNC: 0.62 MG/DL (ref 0.57–1)
EGFRCR SERPLBLD CKD-EPI 2021: 103.4 ML/MIN/1.73
ERYTHROCYTE [DISTWIDTH] IN BLOOD BY AUTOMATED COUNT: 12 % (ref 12.3–15.4)
GLOBULIN SER CALC-MCNC: 2.5 GM/DL
GLUCOSE SERPL-MCNC: 95 MG/DL (ref 65–99)
HCT VFR BLD AUTO: 42.9 % (ref 34–46.6)
HDLC SERPL-MCNC: 55 MG/DL (ref 40–60)
HGB BLD-MCNC: 14.5 G/DL (ref 12–15.9)
LDLC SERPL CALC-MCNC: 142 MG/DL (ref 0–100)
LDLC/HDLC SERPL: 2.53 {RATIO}
MCH RBC QN AUTO: 31.6 PG (ref 26.6–33)
MCHC RBC AUTO-ENTMCNC: 33.8 G/DL (ref 31.5–35.7)
MCV RBC AUTO: 93.5 FL (ref 79–97)
PLATELET # BLD AUTO: 240 10*3/MM3 (ref 140–450)
POTASSIUM SERPL-SCNC: 4.2 MMOL/L (ref 3.5–5.2)
PROT SERPL-MCNC: 6.8 G/DL (ref 6–8.5)
RBC # BLD AUTO: 4.59 10*6/MM3 (ref 3.77–5.28)
SODIUM SERPL-SCNC: 139 MMOL/L (ref 136–145)
TRIGL SERPL-MCNC: 108 MG/DL (ref 0–150)
TSH SERPL DL<=0.005 MIU/L-ACNC: 1.7 UIU/ML (ref 0.27–4.2)
VLDLC SERPL CALC-MCNC: 19 MG/DL (ref 5–40)
WBC # BLD AUTO: 5.51 10*3/MM3 (ref 3.4–10.8)

## 2024-11-15 NOTE — PROGRESS NOTES
Assessment & Plan  Acquired hypothyroidism  Labs today. Refill next week.   Orders:    TSH    Healthcare maintenance    Orders:    CBC (No Diff)    Comprehensive Metabolic Panel    Lipid Panel With LDL / HDL Ratio          Health Maintenance.  Her last colonoscopy was performed in 2023, with the next one due in 2028. All immunizations are current. Blood work will be conducted today.    Constipation and unformed stools.  The constipation could be due to hormonal changes or age-related factors. An increase in fiber intake was recommended. She has already tried switching probiotics, which has helped somewhat. She is advised to continue with the probiotics and increase her fiber intake. TSH to be done today as well.     Hypercholesterolemia.  Her LDL level is 163, which is elevated compared to the previous level of 140. However, it is not considered high unless it exceeds 150. She is curious to see her current cholesterol levels as she has lost some weight and increased her exercise. She is advised to continue monitoring her diet and exercise.         Patient was given instructions and counseling regarding her condition or for health maintenance advice. Please see specific information pulled into the AVS if appropriate.          Carolin is a 59 y.o. being seen today for  Annual Exam   HISTORY    HPI   This patient has ever been tested for HepC : yes .     The patient is a 59-year-old female who presents for evaluation of multiple medical concerns.    She underwent a mammogram on 07/30/2023 and has been receiving care at [facility]. Her last Pap smear was conducted within the past three years and yielded negative results. She maintains a healthy lifestyle, including regular exercise, seatbelt use, dental check-ups, and a balanced diet. She has experienced an unexplained weight loss of 7 pounds, which she attributes to increased physical activity, including playing pickleball twice a week. She has been using  estrogen vaginal cream for the past 1.5 years. She reports no other symptoms and her weight has remained stable for the past six months.    She has noticed a significant change in her bowel movements over the past four months. Previously, she had regular, fully formed stools every morning, but now experiences constipation and unformed stools. She has been using senna to manage the constipation, but it has not restored her previous bowel regularity. She reports no presence of blood in her stool. She has tried probiotics, which provided some relief, but did not fully resolve the issue. She has also increased her fiber intake, which has helped to some extent.    Her cholesterol levels have been consistently high, with an increasing trend each year. Despite being a vegetarian and avoiding egg yolk, her cholesterol levels continued to rise. She is curious to see her current cholesterol levels, as she has recently started consuming egg yolk and cheese. She notes that her cholesterol ratio, good cholesterol, and triglycerides are within normal ranges.    Social History  She  reports that she quit smoking about 19 years ago. Her smoking use included cigarettes. She started smoking about 40 years ago. She has a 21.1 pack-year smoking history. She has never used smokeless tobacco. She reports current alcohol use of about 1.0 standard drink of alcohol per week. She reports that she does not use drugs.  EXAM DATA    Vital Signs        BP Readings from Last 1 Encounters:   11/15/24 100/62     Wt Readings from Last 3 Encounters:   11/15/24 60.3 kg (133 lb)   12/18/23 63.1 kg (139 lb 3.2 oz)   11/27/23 63.7 kg (140 lb 6.4 oz)   Body mass index is 25.13 kg/m².  Physical Exam  Vitals reviewed.   Constitutional:       General: She is not in acute distress.     Appearance: She is well-developed.   HENT:      Head: Normocephalic and atraumatic.      Right Ear: Tympanic membrane, ear canal and external ear normal.      Left Ear:  Tympanic membrane, ear canal and external ear normal.   Eyes:      Conjunctiva/sclera: Conjunctivae normal.   Neck:      Thyroid: No thyromegaly.      Trachea: No tracheal deviation.   Cardiovascular:      Rate and Rhythm: Normal rate and regular rhythm.      Heart sounds: Normal heart sounds.   Pulmonary:      Effort: Pulmonary effort is normal. No respiratory distress.      Breath sounds: Normal breath sounds. No wheezing or rales.   Abdominal:      General: Bowel sounds are normal. There is no distension.      Palpations: Abdomen is soft.      Tenderness: There is no abdominal tenderness.   Musculoskeletal:         General: No deformity.      Cervical back: Normal range of motion and neck supple.   Lymphadenopathy:      Cervical: No cervical adenopathy.   Skin:     General: Skin is warm and dry.   Neurological:      Mental Status: She is alert and oriented to person, place, and time.   Psychiatric:         Behavior: Behavior normal.         Thought Content: Thought content normal.         Judgment: Judgment normal.            Discussed last years LDL and risk was as below.    The 10-year ASCVD risk score (Yenny DK, et al., 2019) is: 1.8%    Values used to calculate the score:      Age: 59 years      Sex: Female      Is Non- : No      Diabetic: No      Tobacco smoker: No      Systolic Blood Pressure: 100 mmHg      Is BP treated: No      HDL Cholesterol: 60 mg/dL      Total Cholesterol: 239 mg/dL   Patient or patient representative verbalized consent for the use of Ambient Listening during the visit with  Shazia Balderas MD for chart documentation. 11/15/2024  08:30 EST

## 2024-11-19 DIAGNOSIS — E03.9 ADULT HYPOTHYROIDISM: ICD-10-CM

## 2024-11-19 RX ORDER — LEVOTHYROXINE SODIUM 100 UG/1
100 TABLET ORAL DAILY
Qty: 90 TABLET | Refills: 3 | Status: SHIPPED | OUTPATIENT
Start: 2024-11-19 | End: 2024-11-19 | Stop reason: SDUPTHER

## 2024-11-19 RX ORDER — LEVOTHYROXINE SODIUM 100 UG/1
100 TABLET ORAL DAILY
Qty: 90 TABLET | Refills: 3 | Status: SHIPPED | OUTPATIENT
Start: 2024-11-19 | End: 2025-11-19

## 2025-06-30 ENCOUNTER — OFFICE VISIT (OUTPATIENT)
Dept: FAMILY MEDICINE CLINIC | Facility: CLINIC | Age: 60
End: 2025-06-30
Payer: COMMERCIAL

## 2025-06-30 VITALS
SYSTOLIC BLOOD PRESSURE: 142 MMHG | OXYGEN SATURATION: 98 % | RESPIRATION RATE: 8 BRPM | HEIGHT: 61 IN | BODY MASS INDEX: 25.3 KG/M2 | WEIGHT: 134 LBS | DIASTOLIC BLOOD PRESSURE: 84 MMHG | HEART RATE: 56 BPM
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
## 2025-06-30 DIAGNOSIS — R23.8 DRY SCALP: ICD-10-CM

## 2025-06-30 PROCEDURE — 99213 OFFICE O/P EST LOW 20 MIN: CPT | Performed by: NURSE PRACTITIONER

## 2025-06-30 RX ORDER — MONTELUKAST SODIUM 10 MG/1
TABLET ORAL
COMMUNITY
Start: 2025-06-24

## 2025-06-30 RX ORDER — DOXYCYCLINE HYCLATE 100 MG
100 TABLET ORAL 2 TIMES DAILY
Qty: 14 TABLET | Refills: 0 | Status: SHIPPED | OUTPATIENT
Start: 2025-06-30 | End: 2025-07-07

## 2025-06-30 RX ORDER — PROGESTERONE 100 MG/1
CAPSULE ORAL
COMMUNITY
Start: 2025-04-29

## 2025-06-30 NOTE — PROGRESS NOTES
Subjective   Carolin Zapien is a 60 y.o. female.     Chief Complaint   Patient presents with    Insect Bite     L ear 4-5 days ago, patient states is hot to touch and painful and itchy        History of Present Illness     History of Present Illness  The patient presents for evaluation of a bug bite.    She reports a bug bite that has become hot and inflamed. The incident occurred on Thursday during [place] activity [place] farm, where she was bitten multiple times while walking through weeds. She also mentions similar bites on her ear, which were not bothersome until yesterday when they became extremely sore. She has been applying an ice pack to the affected area.    Additionally, she recalls an episode of severe itching on her scalp following a hiking trip in [place] two weeks ago. This is unusual for her as she typically experiences dry skin in winter, not summer. She suspects the presence of a large insect in her hair, which was tied in a ponytail at the time. Her  did not experience any bites, leading her to rule out bedbugs. However, her son did have chigger bites.       The following portions of the patient's history were reviewed and updated as appropriate: allergies, current medications, past family history, past medical history, past social history, past surgical history and problem list.    Review of Systems   Constitutional:  Negative for chills, fatigue and fever.   Respiratory:  Negative for cough, chest tightness, shortness of breath and wheezing.    Cardiovascular:  Negative for chest pain, palpitations and leg swelling.   Skin:         Left ear lobe with redness and swelling.    Neurological:  Negative for dizziness and headache.   Hematological: Negative.    Psychiatric/Behavioral: Negative.  Negative for sleep disturbance.        Objective   Physical Exam  Vitals and nursing note reviewed.   Constitutional:       Appearance: She is well-developed.   HENT:      Head:  Normocephalic and atraumatic.        Comments: Dry patch of skin to scalp noted to area marked above.   Eyes:      Conjunctiva/sclera: Conjunctivae normal.      Pupils: Pupils are equal, round, and reactive to light.   Cardiovascular:      Rate and Rhythm: Normal rate and regular rhythm.      Heart sounds: Normal heart sounds. No murmur heard.  Pulmonary:      Effort: Pulmonary effort is normal.      Breath sounds: Normal breath sounds.   Skin:     General: Skin is warm.      Comments: Left earlobe with erythema, swelling and warmth with touch.    Neurological:      Mental Status: She is alert and oriented to person, place, and time.   Psychiatric:         Behavior: Behavior normal.         Thought Content: Thought content normal.         Judgment: Judgment normal.         Vitals:    06/30/25 1430   BP: 142/84   Pulse: 56   Resp: 8   SpO2: 98%     Body mass index is 25.32 kg/m².      Procedures    Assessment & Plan   Problems Addressed this Visit    None  Visit Diagnoses         Bug bite, initial encounter    -  Primary    Relevant Medications    doxycycline (VIBRAMYICN) 100 MG tablet      Dry scalp              Diagnoses         Codes Comments      Bug bite, initial encounter    -  Primary ICD-10-CM: W57.XXXA  ICD-9-CM: 919.4       Dry scalp     ICD-10-CM: R23.8  ICD-9-CM: 701.1             Assessment & Plan  1. Insect bite.  - The eardrum is swollen, but the inner ear appears normal with no redness.  - Doxycycline will be prescribed for 7 days to treat the insect bite.  - If there is no improvement, she should call back.    2. Dry scalp.  - The scalp is notably dry, particularly in one patch.  - Advised to use Head and Shoulders shampoo as recommended by a dermatologist.              Return if symptoms worsen or fail to improve.    Patient or patient representative verbalized consent for the use of Ambient Listening during the visit with  MADELYN Gibbs for chart documentation. 6/30/2025  14:56 EDT

## 2025-07-25 ENCOUNTER — TREATMENT (OUTPATIENT)
Age: 60
End: 2025-07-25
Payer: COMMERCIAL

## 2025-07-25 DIAGNOSIS — R29.898 DECREASED STRENGTH OF UPPER EXTREMITY: ICD-10-CM

## 2025-07-25 DIAGNOSIS — M25.611 DECREASED RANGE OF MOTION OF RIGHT SHOULDER: ICD-10-CM

## 2025-07-25 DIAGNOSIS — M25.512 CHRONIC LEFT SHOULDER PAIN: Primary | ICD-10-CM

## 2025-07-25 DIAGNOSIS — G89.29 CHRONIC LEFT SHOULDER PAIN: Primary | ICD-10-CM

## 2025-07-25 DIAGNOSIS — M54.2 CHRONIC NECK PAIN: ICD-10-CM

## 2025-07-25 DIAGNOSIS — G89.29 CHRONIC NECK PAIN: ICD-10-CM

## 2025-07-25 NOTE — PATIENT INSTRUCTIONS
Access Code: [email or web address]  URL: [email or web address]  Date: 07/25/2025  Prepared by: Renee Beard    Exercises  - First Rib Mobilization with Strap  - 1 x daily - 7 x weekly - 1 sets - 5 reps - 10-15 sec. hold  - Seated Shoulder Scaption AAROM with Pulley at Side  - 3 x daily - 7 x weekly - 3 sets - 10 reps  - Seated Shoulder Flexion AAROM with Pulley Behind (Mirrored)  - 1 x daily - 7 x weekly - 3 sets - 10 reps

## 2025-07-25 NOTE — PROGRESS NOTES
Physical Therapy Initial Evaluation and Plan of Care  [facility] [place]   [address] [place] [address]  P: [phone]       F: [phone]       Patient: Carolin Zapien   : 1965  Visit Diagnoses:     ICD-10-CM ICD-9-CM   1. Chronic left shoulder pain  M25.512 719.41    G89.29 338.29   2. Chronic neck pain  M54.2 723.1    G89.29 338.29   3. Decreased range of motion of right shoulder  M25.611 719.51   4. Decreased strength of upper extremity  R29.898 729.89     Referring practitioner: Self Referring  Date of Initial Visit: 2025  Today's Date: 2025  Patient seen for 1 sessions           Subjective Questionnaire: QuickDASH: 25%      Subjective Evaluation    History of Present Illness  Mechanism of injury: Pain in left shoulder for about a year.  Has progressively worsened.  States with certain movements get a sharp pain and it can radiate down arm to hand. No N/T    Currently not working.  Has been taking painting classes, pottery class.  Had difficulty due to left shoulder.  States her wrists have been bothering her also.      Patient reports she has been having a lot of neck pain and TMJ pain on the left side as well.      Subjective comment: Patient reports neck and left shoulder pain.Pain  At best pain ratin  At worst pain rating: 10 (brief episodes with certain movements)  Location: left shoulder  Quality: dull ache and sharp  Relieving factors: ice  Aggravating factors: sleeping, movement, overhead activity, outstretched reach and lifting (reaching behind back, push/pull)    Social Support  Lives in: multiple-level home  Lives with: spouse    Hand dominance: right    Diagnostic Tests  No diagnostic tests performed    Patient Goals  Patient goals for therapy: decreased pain and increased strength         Past Medical History:   Diagnosis Date    Allergic 1980    Allergic rhinitis     Allergy-induced asthma     Arthritis of back      Arthritis of neck 1998    Degenerative cervical disk disease    Bursitis of hip 2020    saw Dr. Leonardo for left hip pain    Cataract     maria l premature    Cholelithiasis 2017    gallbladder removed 2018    Colon polyp August 2020    [facility]    DDD (degenerative disc disease), cervical     Diverticulitis of colon 9/23/2010    colonoscopy indicated mild case    Diverticulosis 2010    mild    GERD (gastroesophageal reflux disease) 2010    Dr Davison    Hypothyroidism 2010    Incomplete tear of right rotator cuff 07/27/2023    Added automatically from request for surgery [id number]    Kidney cyst, acquired 10/24/2015    Low back pain 2014    Migraines     RARELY    Multiple joint pain 07/18/2013    Neck pain     Osteoarthritis     Rash     UPPER RIGHT CHEST INSTRUCTED PT TO NOTIFY DR RUSHING IF NOT GONE BY DAY OF SURGERY    Raynaud's disease     Right shoulder pain     Rotator cuff syndrome August 2022    see Dr. Rushing for this    Seasonal allergies     Seborrheic dermatitis     TMJ dysfunction     Visual impairment 2014    age related     Past Surgical History:   Procedure Laterality Date    BREAST CYST ASPIRATION  1995    CHOLECYSTECTOMY N/A 12/29/2017    Procedure: CHOLECYSTECTOMY LAPAROSCOPIC;  Surgeon: Mc Gonzalez MD;  Location:  [facility] [place];  Service:     COLONOSCOPY N/A 08/21/2020    Procedure: COLONOSCOPY TO CECUM AND TERMINAL ILEUM WITH COLD SNARE POLYPECTOMIES;  Surgeon: Ganesh Davison MD;  [facility];  Service: Gastroenterology;  Laterality: N/A;  PRE- SCREENING  POST- NORMAL TI, COLON POLYPS, DIVERTICULOSIS, INTERNAL HEMORRHOIDS    COLPOSCOPY      WITH BX; RESOLVED    DILATATION AND CURETTAGE      ENDOSCOPY N/A 12/14/2017    Procedure: ESOPHAGOGASTRODUODENOSCOPY with biopsies;  Surgeon: Ganesh Davison MD;  [facility];  Service:     ENDOSCOPY AND COLONOSCOPY  2010    GUM SURGERY  2021    LEFT UPPER JAW    SEPTOPLASTY  2003    SEPTOPLASTY, TURBINECTOMY, ENDOSCOPIC MAXILLARY  ANTROSTOMY      [facility]    SHOULDER ARTHROSCOPY Right 08/30/2023    Procedure: SHOULDER ARTHROSCOPY WITH DEBRIDEMENT OF LABRIUM, ROTATOR CUFF, DECOMPRESSION, AND INJECTION IN JOINT;  Surgeon: Rosaura Rushing MD;  [place] [facility] [place] [facility];  Service: Orthopedics;  Laterality: Right;    SINUS SURGERY  2003    TONSILLECTOMY      TRIGGER POINT INJECTION  2019    Cervical branch block    UPPER GASTROINTESTINAL ENDOSCOPY  9/23/2010    Dob[facility]i    URETEROSCOPY      with cystoscopy         Objective          Palpation   Left   Hypertonic in the cervical paraspinals, pectoralis major, pectoralis minor, scalenes and upper trapezius.   Tenderness of the cervical paraspinals, scalenes and upper trapezius.   Trigger point to upper trapezius.     Right   Hypertonic in the cervical paraspinals, pectoralis major and pectoralis minor. Tenderness of the cervical paraspinals.     Tenderness   Cervical Spine   Tenderness in the left 1st rib.     Active Range of Motion   Cervical/Thoracic Spine   Cervical    Flexion: 40 degrees   Extension: 55 degrees with pain  Left lateral flexion: 35 degrees   Right lateral flexion: 35 degrees   Left rotation: 45 degrees   Right rotation: 45 degrees   Left Shoulder   Flexion: 128 degrees   Extension: 22 degrees   Abduction: 128 degrees   External rotation 90°: 30 degrees   Internal rotation 90°: 60 degrees     Joint Play   Left Shoulder  Hypomobile in the anterior capsule, posterior capsule, inferior capsule, cervical spine and long axis distraction.    Strength/Myotome Testing   Cervical Spine   Neck extension: 5  Neck flexion: 5    Left   Neck lateral flexion (C3): 5    Right   Normal strength    Left Shoulder     Planes of Motion   Flexion: 4   Extension: 5   Abduction: 4   Adduction: 5   External rotation at 0°: 4   Internal rotation at 0°: 4+     Left Elbow   Flexion: 4  Extension: 4    Left Wrist/Hand   Wrist extension: 4  Wrist flexion: 4    Tests   Cervical     Left   Negative Spurling's sign.      Right   Negative Spurling's sign.     Left Shoulder   Positive Hawkin's and Neer's.     Cervical Flexibility Comments:   Decreased cervical/UQ muscle flexibility          Assessment & Plan       Assessment  Impairments: abnormal muscle tone, abnormal or restricted ROM, activity intolerance, impaired physical strength, lacks appropriate home exercise program and pain with function   Functional limitations: carrying objects, lifting, sleeping, pulling, pushing, uncomfortable because of pain, reaching behind back and reaching overhead (  )  Assessment details: Carolin Zapien is a pleasant 60 y.o. female that presents with decreased left shoulder range of motion, decreased cervical spine/UQ muscle flexibility, decreased upper extremity strength, pain limited use of left lower extremity with functional activity/ADL/IADL. Pt will benefit from skilled PT services in order to address listed impairments, decrease pain and restore function.      Prognosis: good  Prognosis details: Patient demonstrates good rehab potential as evidenced by high motivation to participate with PT POC and to return to PLOF/ADLs/IADLs.        Goals  Plan Goals: Short Term Goals (4 wks):  1. Patient to have increased left shoulder flexion and abduction to 170 degrees or better.  2. Patient will have increased left shoulder IR and ER to 80 degrees or better.  3. Patient will have left first rib alignment and mobility to WFL.  4. Patient will have increased GHJ mobility to WNLs to allow for restoration of normal joint mechanics and decrease joint/soft tissue stresses.    Long Term Goals (8 wks):  1.  Patient will have improved DASH score of 15% or less.  2.  Patient will have increased left upper extremity strength to 5/5.  3.  Patient will be independent in performance of HEP for carryover upon discharge from skilled PT services.  4.  Patient will be able to utilize left upper extremity for functional reach, lift and carry to complete  ADLs/IADLs.      Plan  Therapy options: will be seen for skilled therapy services  Planned modality interventions: TENS, cryotherapy, thermotherapy (hydrocollator packs), dry needling and traction  Planned therapy interventions: manual therapy, soft tissue mobilization, strengthening, stretching, functional ROM exercises, joint mobilization, home exercise program, neuromuscular re-education, postural training, therapeutic activities, abdominal trunk stabilization, body mechanics training and flexibility  Frequency: 1x week  Duration in weeks: 8  Treatment plan discussed with: patient  Plan details: Pt was educated on the importance of their HEP and their current need for continued skilled physical therapy. Patients goals and potential limitations were discussed and pt is in agreement with current plan of care and treatment emphasis.                History # of Personal Factors and/or Comorbidities: HIGH (3+)  Examination of Body System(s): # of elements: MODERATE (3)  Clinical Presentation: STABLE   Clinical Decision Making: MODERATE      Timed:         Manual Therapy:    10     mins  50611;     Therapeutic Exercise:    8     mins  94123;     Neuromuscular Dawn:        mins  45739;    Therapeutic Activity:     10     mins  57022;     Gait Training:           mins  56767;     Ultrasound:          mins  08750;    Ionto                                  mins  12162  Self Care                            mins  31252  Orthotic MGMT/Train         mins  95472    Un-Timed:  Electrical Stimulation:         mins  74676 ( );  Dry Needling:          mins  21301 self-pay;  Dry Needling:          mins  55018 self-pay  Traction          mins  23946  Canalith Repos         mins  34116  Low Eval          mins  96811  Mod Eval     30     mins  17934  High Eval                            mins  02090    Timed Treatment:   28   mins   Total Treatment:     58   mins      PT SIGNATURE: Renee Beard PT     License Number:  [id number]  Electronically signed by Renee Beard, PT, 07/25/25, 2:10 PM EDT      DATE TREATMENT INITIATED: 7/25/2025    Initial Certification  Certification Period: 7/25/2025 thru 10/22/2025  I certify that the therapy services are furnished while this patient is under my care.  The services outlined above are required by this patient, and will be reviewed every 90 days.     PHYSICIAN: Referring, Self      NPI:   DATE:         Please sign and return via fax to [phone]. Thank you, [facility].

## 2025-07-29 ENCOUNTER — TREATMENT (OUTPATIENT)
Age: 60
End: 2025-07-29
Payer: COMMERCIAL

## 2025-07-29 DIAGNOSIS — G89.29 CHRONIC NECK PAIN: ICD-10-CM

## 2025-07-29 DIAGNOSIS — R29.898 DECREASED STRENGTH OF UPPER EXTREMITY: ICD-10-CM

## 2025-07-29 DIAGNOSIS — G89.29 CHRONIC LEFT SHOULDER PAIN: Primary | ICD-10-CM

## 2025-07-29 DIAGNOSIS — M54.2 CHRONIC NECK PAIN: ICD-10-CM

## 2025-07-29 DIAGNOSIS — M25.612 DECREASED ROM OF LEFT SHOULDER: ICD-10-CM

## 2025-07-29 DIAGNOSIS — M25.512 CHRONIC LEFT SHOULDER PAIN: Primary | ICD-10-CM

## 2025-07-29 PROCEDURE — 97110 THERAPEUTIC EXERCISES: CPT | Performed by: PHYSICAL THERAPIST

## 2025-07-29 PROCEDURE — 97140 MANUAL THERAPY 1/> REGIONS: CPT | Performed by: PHYSICAL THERAPIST

## 2025-07-29 PROCEDURE — 97530 THERAPEUTIC ACTIVITIES: CPT | Performed by: PHYSICAL THERAPIST

## 2025-07-29 NOTE — PROGRESS NOTES
Physical Therapy Treatment Note  [facility]   [address] [place] [address]  P: [phone]       F: [phone]      Patient: Carolin Zapien   : 1965  Treatment Diagnosis:     ICD-10-CM ICD-9-CM   1. Chronic left shoulder pain  M25.512 719.41    G89.29 338.29   2. Chronic neck pain  M54.2 723.1    G89.29 338.29   3. Decreased ROM of left shoulder  M25.612 719.51   4. Decreased strength of upper extremity  R29.898 729.89     Referring practitioner: Self Referring  Date of Initial Visit: Type: THERAPY  Noted: 2025  Today's Date: 2025  Patient seen for 2 sessions           Subjective   Patient reports she had some neck pain with intense pressure in the base of her head and up her head and behind her eyes following last session.  States this is not an unusual symptom for her to have especially after doing any kind of upper extremity exercises or activities.  Reports she has had prior treatment on her neck including injections and RFA procedures which provoked a lot of pain initially and minimal long-term symptom relief.    Objective     See Exercise, Manual, and Modality Logs for complete treatment.       Assessment/Plan  Reviewed anatomy and symptom pathways in nature of radicular symptoms and interaction between cervical and shoulder girdle dysfunction.  Patient tolerated manual therapy well with improved left shoulder range of motion.  She continues to have endrange discomfort although her mobility was significantly improved following treatment.  Recommendation for patient to consult with neurosurgeon regarding her cervical spine symptoms due to her history and prior MRI findings.  Progress per Plan of Care           Timed:         Manual Therapy:    15     mins  06441;     Therapeutic Exercise:    8     mins  32189;    Neuromuscular Dawn:        mins  50501;    Therapeutic Activity:     10     mins  13400;     Gait Training:           mins   11691;     Ultrasound:          mins  16241;    Ionto                                  mins  14805  Self Care                            mins  53459  Canalith Repos         mins  53992  Orthotic MGMT/Train         mins  37046    Un-Timed:  Electrical Stimulation:         mins  21552 ( );  Dry Needling:          mins  54842 self-pay;  Dry Needling:          mins  64144 self-pay  Traction          mins  11070  Group Therapy:          mins  57988;    Timed Treatment:   33   mins   Total Treatment:     33   mins        PT SIGNATURE: Renee Beard PT     License Number: [id number]  Electronically signed by Renee Beard PT, 07/29/25, 9:30 AM EDT

## 2025-08-15 ENCOUNTER — TREATMENT (OUTPATIENT)
Age: 60
End: 2025-08-15
Payer: COMMERCIAL

## 2025-08-15 DIAGNOSIS — G89.29 CHRONIC NECK PAIN: ICD-10-CM

## 2025-08-15 DIAGNOSIS — M54.2 CHRONIC NECK PAIN: ICD-10-CM

## 2025-08-15 DIAGNOSIS — G89.29 CHRONIC LEFT SHOULDER PAIN: Primary | ICD-10-CM

## 2025-08-15 DIAGNOSIS — M25.612 DECREASED ROM OF LEFT SHOULDER: ICD-10-CM

## 2025-08-15 DIAGNOSIS — R29.898 DECREASED STRENGTH OF UPPER EXTREMITY: ICD-10-CM

## 2025-08-15 DIAGNOSIS — M25.512 CHRONIC LEFT SHOULDER PAIN: Primary | ICD-10-CM

## 2025-08-18 ENCOUNTER — TREATMENT (OUTPATIENT)
Age: 60
End: 2025-08-18
Payer: COMMERCIAL

## 2025-08-18 DIAGNOSIS — M25.512 CHRONIC LEFT SHOULDER PAIN: Primary | ICD-10-CM

## 2025-08-18 DIAGNOSIS — R29.898 DECREASED STRENGTH OF UPPER EXTREMITY: ICD-10-CM

## 2025-08-18 DIAGNOSIS — M25.612 DECREASED ROM OF LEFT SHOULDER: ICD-10-CM

## 2025-08-18 DIAGNOSIS — G89.29 CHRONIC LEFT SHOULDER PAIN: Primary | ICD-10-CM

## 2025-08-18 DIAGNOSIS — M54.2 CHRONIC NECK PAIN: ICD-10-CM

## 2025-08-18 DIAGNOSIS — G89.29 CHRONIC NECK PAIN: ICD-10-CM

## 2025-08-18 PROCEDURE — 97140 MANUAL THERAPY 1/> REGIONS: CPT | Performed by: PHYSICAL THERAPIST

## 2025-08-18 PROCEDURE — 20561 NDL INSJ W/O NJX 3+ MUSC: CPT | Performed by: PHYSICAL THERAPIST

## 2025-08-18 PROCEDURE — 97110 THERAPEUTIC EXERCISES: CPT | Performed by: PHYSICAL THERAPIST

## 2025-08-26 ENCOUNTER — TREATMENT (OUTPATIENT)
Age: 60
End: 2025-08-26
Payer: COMMERCIAL

## 2025-08-26 DIAGNOSIS — M25.512 CHRONIC LEFT SHOULDER PAIN: Primary | ICD-10-CM

## 2025-08-26 DIAGNOSIS — R29.898 DECREASED STRENGTH OF UPPER EXTREMITY: ICD-10-CM

## 2025-08-26 DIAGNOSIS — M25.612 DECREASED ROM OF LEFT SHOULDER: ICD-10-CM

## 2025-08-26 DIAGNOSIS — M54.2 CHRONIC NECK PAIN: ICD-10-CM

## 2025-08-26 DIAGNOSIS — G89.29 CHRONIC NECK PAIN: ICD-10-CM

## 2025-08-26 DIAGNOSIS — G89.29 CHRONIC LEFT SHOULDER PAIN: Primary | ICD-10-CM

## (undated) DEVICE — SYR LUERLOK 30CC

## (undated) DEVICE — SENSR O2 OXIMAX FNGR A/ 18IN NONSTR

## (undated) DEVICE — KT POSTN ARM TRIMANO BEACH CHR W/DRP

## (undated) DEVICE — 3M™ STERI-STRIP™ COMPOUND BENZOIN TINCTURE 40 BAGS/CARTON 4 CARTONS/CASE C1544: Brand: 3M™ STERI-STRIP™

## (undated) DEVICE — ADAPT CLN BIOGUARD AIR/H2O DISP

## (undated) DEVICE — DRSNG WND BORDR/ADHS NONADHR/GZ LF 2X2IN STRL

## (undated) DEVICE — GLV SURG BIOGEL LTX PF 6 1/2

## (undated) DEVICE — TBG ARTHSCP PUMP W CONN/REDUC 8FT

## (undated) DEVICE — SUT ETHLN 3/0 PS1 18IN 1663H

## (undated) DEVICE — SNAR POLYP SENSATION STDOVL 27 240 BX40

## (undated) DEVICE — BITEBLOCK OMNI BLOC

## (undated) DEVICE — 2, DISPOSABLE SUCTION/IRRIGATOR WITH DISPOSABLE TIP: Brand: STRYKEFLOW

## (undated) DEVICE — PK ARTHSCP SHLDR TOWER 40

## (undated) DEVICE — APPL CHLORAPREP W/TINT 26ML ORNG

## (undated) DEVICE — ENDOPATH XCEL BLADELESS TROCARS WITH STABILITY SLEEVES: Brand: ENDOPATH XCEL

## (undated) DEVICE — BLANKT WARM LOWR/BDY 100X120CM

## (undated) DEVICE — TBG 02 CRUSH RESIST LF CLR 7FT

## (undated) DEVICE — TUBING, SUCTION, 1/4" X 10', STRAIGHT: Brand: MEDLINE

## (undated) DEVICE — OSC GEN LAPAROSCOPY: Brand: MEDLINE INDUSTRIES, INC.

## (undated) DEVICE — THE SINGLE USE ETRAP – POLYP TRAP IS USED FOR SUCTION RETRIEVAL OF ENDOSCOPICALLY REMOVED POLYPS.: Brand: ETRAP

## (undated) DEVICE — DRSNG WND GZ CURAD OIL EMULSION 3X3IN STRL

## (undated) DEVICE — TBG ARTHSCP PT W CONN/REDUC 8FT

## (undated) DEVICE — ABL ASP APOLLO RF XL 90D

## (undated) DEVICE — FRCP BX RADJAW4 NDL 2.8 240CM LG OG BX40

## (undated) DEVICE — CANN O2 ETCO2 FITS ALL CONN CO2 SMPL A/ 7IN DISP LF

## (undated) DEVICE — PATIENT RETURN ELECTRODE, SINGLE-USE, CONTACT QUALITY MONITORING, ADULT, WITH 9FT CORD, FOR PATIENTS WEIGING OVER 33LBS. (15KG): Brand: MEGADYNE

## (undated) DEVICE — UNDYED BRAIDED (POLYGLACTIN 910), SYNTHETIC ABSORBABLE SUTURE: Brand: COATED VICRYL

## (undated) DEVICE — THE TORRENT IRRIGATION SCOPE CONNECTOR IS USED WITH THE TORRENT IRRIGATION TUBING TO PROVIDE IRRIGATION FLUIDS SUCH AS STERILE WATER DURING GASTROINTESTINAL ENDOSCOPIC PROCEDURES WHEN USED IN CONJUNCTION WITH AN IRRIGATION PUMP (OR ELECTROSURGICAL UNIT).: Brand: TORRENT

## (undated) DEVICE — Device: Brand: DEFENDO AIR/WATER/SUCTION AND BIOPSY VALVE

## (undated) DEVICE — LN SMPL CO2 SHTRM SD STREAM W/M LUER

## (undated) DEVICE — KT ORCA ORCAPOD DISP STRL

## (undated) DEVICE — GLV SURG BIOGEL LTX PF 7

## (undated) DEVICE — CANN NASL CO2 TRULINK W/O2 A/

## (undated) DEVICE — ARM SLING: Brand: DEROYAL

## (undated) DEVICE — DRAPE,SHOULDER,BEACH ULTRAGARD: Brand: MEDLINE

## (undated) DEVICE — 3M™ STERI-STRIP™ REINFORCED ADHESIVE SKIN CLOSURES, R1547, 1/2 IN X 4 IN (12 MM X 100 MM), 6 STRIPS/ENVELOPE: Brand: 3M™ STERI-STRIP™

## (undated) DEVICE — ENDOPOUCH RETRIEVER SPECIMEN RETRIEVAL BAGS: Brand: ENDOPOUCH RETRIEVER

## (undated) DEVICE — SKIN PREP TRAY W/CHG: Brand: MEDLINE INDUSTRIES, INC.